# Patient Record
Sex: FEMALE | Race: WHITE | Employment: OTHER | URBAN - METROPOLITAN AREA
[De-identification: names, ages, dates, MRNs, and addresses within clinical notes are randomized per-mention and may not be internally consistent; named-entity substitution may affect disease eponyms.]

---

## 2019-11-18 DIAGNOSIS — M25.511 ACUTE PAIN OF RIGHT SHOULDER: Primary | ICD-10-CM

## 2021-02-12 ENCOUNTER — HOSPITAL ENCOUNTER (OUTPATIENT)
Dept: NUCLEAR MEDICINE | Age: 61
Discharge: HOME | End: 2021-02-12
Payer: MEDICARE

## 2021-02-12 ENCOUNTER — HOSPITAL ENCOUNTER (OUTPATIENT)
Dept: NON INVASIVE DIAGNOSTICS | Age: 61
Discharge: HOME | End: 2021-02-12
Payer: MEDICARE

## 2021-02-12 DIAGNOSIS — R07.89 OTHER CHEST PAIN: ICD-10-CM

## 2021-02-12 LAB
LV EF: 75 %
LVEF MODALITY: NORMAL

## 2021-02-12 PROCEDURE — A9500 TC99M SESTAMIBI: HCPCS | Performed by: RADIOLOGY

## 2021-02-12 PROCEDURE — 78452 HT MUSCLE IMAGE SPECT MULT: CPT

## 2021-02-12 PROCEDURE — 93018 CV STRESS TEST I&R ONLY: CPT | Performed by: INTERNAL MEDICINE

## 2021-02-12 PROCEDURE — 93017 CV STRESS TEST TRACING ONLY: CPT

## 2021-02-12 PROCEDURE — 3430000000 HC RX DIAGNOSTIC RADIOPHARMACEUTICAL: Performed by: RADIOLOGY

## 2021-02-12 PROCEDURE — 78452 HT MUSCLE IMAGE SPECT MULT: CPT | Performed by: INTERNAL MEDICINE

## 2021-02-12 PROCEDURE — 93016 CV STRESS TEST SUPVJ ONLY: CPT | Performed by: INTERNAL MEDICINE

## 2021-02-12 PROCEDURE — 6360000002 HC RX W HCPCS: Performed by: FAMILY MEDICINE

## 2021-02-12 RX ADMIN — REGADENOSON 0.4 MG: 0.08 INJECTION, SOLUTION INTRAVENOUS at 09:31

## 2021-02-12 RX ADMIN — Medication 10 MILLICURIE: at 08:14

## 2021-02-12 RX ADMIN — Medication 30 MILLICURIE: at 09:58

## 2021-02-12 NOTE — PROCEDURES
[address] [place]                              CARDIAC STRESS TEST    PATIENT NAME: Shikha Hunt                    :        1960  MED REC NO:   [id number]                            ROOM:  ACCOUNT NO:   [de-identified]                           ADMIT DATE: 2021  PROVIDER:     Cherri Sanches DO    CARDIOVASCULAR DIAGNOSTIC DEPARTMENT    DATE OF STUDY:  2021    PROCEDURE:  Barron Ruse stress test.    Intravenous Lexiscan stress test was performed using a nuclear medicine  in the form of Cardiolite for evaluation of chest pain with a resting  EKG to be normal sinus mechanism. Ventricular rate was 84 beats per  minute with underlying sinus arrhythmia being present. The AZ interval  was normal.  QRS complex was not prolonged. Left axis deviation was  present. No acute finding was noted. Blood pressure at the beginning  of the stress test was asymptomatic, but was low at 85/44. With the aid  of nuclear medicine in the form of Cardiolite, patient was connected to  continuous cardiac monitoring. Intravenous Lexiscan at 0.4 mg was  infused over 10-second period. Immediately after infusion of Lexiscan,  the patient was injected with Cardiolite and the test was terminated 1  minute. The patient was observed in the recovery phase for 6 minutes. Maximum heart rate achieved during administration of Lexiscan was 122  beats per minute. This was 76% maximum predicted. Blood pressure  remained stable during infusion at 94/48. Electrographically, there was  no evidence of Lexiscan-induced ischemia. No significant dysrhythmia  noted. The patient tolerated the infusion well and was transferred to  the Nuclear Department for Cardiolite imaging. In conclusion, no  adverse effects of Lexiscan infusion. Clinical correlation is  recommended.         FLORIDALMA SEYMOUR DO D: 02/12/2021 17:19:49       T: 02/12/2021 17:29:18     [email or web address]  Job#: [id number]     Doc#: [id number]    CC:

## 2021-04-09 ENCOUNTER — TELEPHONE (OUTPATIENT)
Dept: ENDOSCOPY | Age: 61
End: 2021-04-09

## 2021-04-29 ENCOUNTER — TELEPHONE (OUTPATIENT)
Dept: ENDOSCOPY | Age: 61
End: 2021-04-29

## 2021-05-03 DIAGNOSIS — M25.511 ACUTE PAIN OF BOTH SHOULDERS: Primary | ICD-10-CM

## 2021-05-03 DIAGNOSIS — M25.512 ACUTE PAIN OF BOTH SHOULDERS: Primary | ICD-10-CM

## 2021-05-04 ENCOUNTER — OFFICE VISIT (OUTPATIENT)
Dept: ORTHOPEDIC SURGERY | Age: 61
End: 2021-05-04
Payer: MEDICARE

## 2021-05-04 VITALS — TEMPERATURE: 98 F | WEIGHT: 136 LBS | HEIGHT: 64 IN | BODY MASS INDEX: 23.22 KG/M2

## 2021-05-04 DIAGNOSIS — M75.101 TEAR OF RIGHT ROTATOR CUFF, UNSPECIFIED TEAR EXTENT, UNSPECIFIED WHETHER TRAUMATIC: ICD-10-CM

## 2021-05-04 DIAGNOSIS — M75.102 NONTRAUMATIC TEAR OF LEFT ROTATOR CUFF, UNSPECIFIED TEAR EXTENT: Primary | ICD-10-CM

## 2021-05-04 PROCEDURE — G8427 DOCREV CUR MEDS BY ELIG CLIN: HCPCS | Performed by: ORTHOPAEDIC SURGERY

## 2021-05-04 PROCEDURE — 3017F COLORECTAL CA SCREEN DOC REV: CPT | Performed by: ORTHOPAEDIC SURGERY

## 2021-05-04 PROCEDURE — 4004F PT TOBACCO SCREEN RCVD TLK: CPT | Performed by: ORTHOPAEDIC SURGERY

## 2021-05-04 PROCEDURE — G8420 CALC BMI NORM PARAMETERS: HCPCS | Performed by: ORTHOPAEDIC SURGERY

## 2021-05-04 PROCEDURE — 99203 OFFICE O/P NEW LOW 30 MIN: CPT | Performed by: ORTHOPAEDIC SURGERY

## 2021-05-04 RX ORDER — IBUPROFEN 600 MG/1
TABLET ORAL
COMMUNITY
Start: 2021-03-05 | End: 2021-08-02 | Stop reason: ALTCHOICE

## 2021-05-04 RX ORDER — RISPERIDONE 2 MG/1
TABLET, FILM COATED ORAL
COMMUNITY
Start: 2021-03-08 | End: 2022-04-22

## 2021-05-04 RX ORDER — PREGABALIN 75 MG/1
CAPSULE ORAL
COMMUNITY
Start: 2021-04-21

## 2021-05-04 RX ORDER — BUDESONIDE AND FORMOTEROL FUMARATE DIHYDRATE 160; 4.5 UG/1; UG/1
2 AEROSOL RESPIRATORY (INHALATION) 2 TIMES DAILY PRN
COMMUNITY
Start: 2021-04-07

## 2021-05-04 RX ORDER — DIPHENOXYLATE HYDROCHLORIDE AND ATROPINE SULFATE 2.5; .025 MG/1; MG/1
TABLET ORAL
COMMUNITY
Start: 2021-04-06

## 2021-05-04 NOTE — PROGRESS NOTES
Chief Complaint   Patient presents with    Shoulder Pain     b/l shoulder/arm pain for the past 6 months         HPI:    Patient is 61 y.o. female complaining of right shoulder pain and weakness for 6 months. She denies a specific traumatic injury to the right shoulder. Previous treatments include rest, ice, and anti-inflammatory medication and HEP without much relief. She denies any other orthopedic complaints. ROS:    Skin: (-) rash,(-) psoriasis,(-) eczema, (-)skin cancer. Neurologic: (-)numbness, (-)tingling, (-)headaches, (-) LOC. Cardiovascular: (-) Chest pain, (-) swelling in legs/feet, (-) SOB, (-) cramping in legs/feet with walking.     All other review of systems negative except stated above or in HPI      Past Medical History:   Diagnosis Date    Arthritis     Bipolar 1 disorder (HCC)     Chronic fatigue     Depression     Fibromyalgia     GERD (gastroesophageal reflux disease)     Migraines     Neuropathy     PONV (postoperative nausea and vomiting)     Short-term memory loss      Past Surgical History:   Procedure Laterality Date    CARDIOVASCULAR STRESS TEST  02/12/2021    Lexiscan stress test    CHOLECYSTECTOMY      COLONOSCOPY      ENDOSCOPY, COLON, DIAGNOSTIC      HYSTERECTOMY      SINUS SURGERY         Current Outpatient Medications:     SYMBICORT 160-4.5 MCG/ACT AERO, INHALE 1 PUFF BY MOUTH TWICE DAILY, Disp: , Rfl:     diphenoxylate-atropine (LOMOTIL) 2.5-0.025 MG per tablet, TAKE 1 TO 2 TABLETS BY MOUTH EVERY 8 HOURS AS NEEDED, Disp: , Rfl:     ibuprofen (ADVIL;MOTRIN) 600 MG tablet, TAKE 1 TABLET BY MOUTH TWICE DAILY, Disp: , Rfl:     pregabalin (LYRICA) 75 MG capsule, TAKE 1 CAPSULE BY MOUTH FOUR TIMES DAILY, Disp: , Rfl:     risperiDONE (RISPERDAL) 2 MG tablet, TAKE 1 TABLET BY MOUTH AT BEDTIME, Disp: , Rfl:     diclofenac sodium (VOLTAREN) 1 % GEL, Apply topically 2 times daily, Disp: 240 g, Rfl: 1    amitriptyline (ELAVIL) 50 MG tablet, TAKE 1 TABLET BY MOUTH EVERY DAY, Disp: 30 tablet, Rfl: 3    gabapentin (NEURONTIN) 800 MG tablet, Take 1 tablet by mouth 3 times daily, Disp: 90 tablet, Rfl: 0    ARIPiprazole (ABILIFY) 10 MG tablet, Take 10 mg by mouth daily. , Disp: , Rfl:     OLANZapine (ZYPREXA) 10 MG tablet, Take 10 mg by mouth nightly., Disp: , Rfl:     levomilnacipran (FETZIMA) 20 MG CP24 ER capsule, Take 20 mg by mouth daily. , Disp: , Rfl:     Gabapentin, PHN, 300 MG TABS, Take 800 mg by mouth three times daily. , Disp: , Rfl:     esomeprazole (NEXIUM) 40 MG capsule, Take 40 mg by mouth every morning (before breakfast). , Disp: , Rfl:     Sumatriptan-Naproxen Sodium (TREXIMET PO), Take 10 mg by mouth as needed.   , Disp: , Rfl:   Allergies   Allergen Reactions    Erythromycin Rash     Social History     Socioeconomic History    Marital status:      Spouse name: Not on file    Number of children: Not on file    Years of education: Not on file    Highest education level: Not on file   Occupational History    Not on file   Social Needs    Financial resource strain: Not on file    Food insecurity     Worry: Not on file     Inability: Not on file    Transportation needs     Medical: Not on file     Non-medical: Not on file   Tobacco Use    Smoking status: Current Every Day Smoker     Packs/day: 2.00     Years: 1.00     Pack years: 2.00   Substance and Sexual Activity    Alcohol use: No    Drug use: No    Sexual activity: Not on file   Lifestyle    Physical activity     Days per week: Not on file     Minutes per session: Not on file    Stress: Not on file   Relationships    Social connections     Talks on phone: Not on file     Gets together: Not on file     Attends Muslim service: Not on file     Active member of club or organization: Not on file     Attends meetings of clubs or organizations: Not on file     Relationship status: Not on file    Intimate partner violence     Fear of current or ex partner: Not on file     Emotionally abused: Not on file     Physically abused: Not on file     Forced sexual activity: Not on file   Other Topics Concern    Not on file   Social History Narrative    Not on file     No family history on file. Physical Exam:    Temp 98 °F (36.7 °C)   Ht 5' 3.5\" (1.613 m)   Wt 136 lb (61.7 kg)   BMI 23.71 kg/m²     GENERAL: alert, appears stated age, cooperative, no acute distress    HEENT: Head is normocephalic, atraumatic. PERRLA. SKIN: Clean, dry, intact. There is not any cellulitis or cutaneous lesions noted in the upper extremities    PULMONARY: breathing is regular and unlabored, no acute distress    CV: The bilateral upper and lower extremities are warm and well-perfused with brisk capillary refill. 2+ pulses UE and LE bilateral.     PSYCHIATRY: Pleasant mood, appropriate behavior, follows commands    NEURO: Sensation is intact distally with light touch with no alteration. Motor exam of the upper extremities show elbow flexion and extension, wrist flexion and extension, and finger abduction grossly intact 5/5. Upper extremity reflexes are bilaterally symmetrical and within normal limits. LYMPH: No lymphedema present distally in upper or lower extremity. MUSCULOSKELETAL:  Shoulder Exam:  Examination of the right shoulder shows: There is not a deformity. There is not erythema. There is not soft tissue swelling. Deltoid region is  tender to palpation. AC Joint is  tender to palpation. Clavicle is not tender to palpation. Bicipital Groove is  tender to palpation. Pectoralis  is not tender to palpation. Scapula/ trapezius is  tender to palpation.   Left:  ROM Full, Strength: Supraspinatus 5/5, Infraspinatus 5/5, Subscapularis 5/5  Right:  /120/60, Strength: Supraspinatus 4/5, Infraspinatus 5/5, Subscapularis 5/5  Left Shoulder:  Crepitus:  no   Tenderness:  none   Effusion:   none   Impingement: negative   Empty Can:  negative   Speed's:  negative      Apprehension:  negative Cross Arm Sign:  negative   Darlington's:  negative       Neer's:  negative      Belly Press Test:  negative      Drop Arm Test:  negative     Right Shoulder:  Crepitus:  no   Tenderness:  mild   Effusion:   non   Impingement: positive   Empty Can:  positive   Speed's: positive   Apprehension:  not tested   Cross Arm Sign:  positive   Darlington's:  positive      Neer's:  positive      Belly Press Test:  negative   Drop Arm Test:  positive           Imaging:  Xr Shoulder Right (min 2 Views)    Result Date: 5/4/2021  EXAMINATION: THREE XRAY VIEWS OF THE RIGHT SHOULDER; THREE XRAY VIEWS OF THE LEFT SHOULDER 5/4/2021 2:03 pm COMPARISON: None. HISTORY: ORDERING SYSTEM PROVIDED HISTORY: Acute pain of both shoulders FINDINGS: Right shoulder: Very mild osteoarthritis at the [facility] [place] and glenohumeral joints. No fracture or dislocation. Normal soft tissues. Left shoulder: Very mild osteoarthritis at the left AC joint. No fracture or dislocation. Normal soft tissues. Very mild osteoarthritis at the [facility] [place] and glenohumeral joints on the right and at the [facility] [place] joint on the left. Xr Shoulder Left (min 2 Views)    Result Date: 5/4/2021  EXAMINATION: THREE XRAY VIEWS OF THE RIGHT SHOULDER; THREE XRAY VIEWS OF THE LEFT SHOULDER 5/4/2021 2:03 pm COMPARISON: None. HISTORY: ORDERING SYSTEM PROVIDED HISTORY: Acute pain of both shoulders FINDINGS: Right shoulder: Very mild osteoarthritis at the [facility] [place] and glenohumeral joints. No fracture or dislocation. Normal soft tissues. Left shoulder: Very mild osteoarthritis at the left AC joint. No fracture or dislocation. Normal soft tissues. Very mild osteoarthritis at the [facility] [place] and glenohumeral joints on the right and at the [facility] [place] joint on the left. César Narayanan was seen today for shoulder pain. Diagnoses and all orders for this visit:    Nontraumatic tear of left rotator cuff, unspecified tear extent  -     MRI SHOULDER LEFT WO CONTRAST;  Future    Tear of right rotator cuff, unspecified tear extent, unspecified whether traumatic  -     MRI SHOULDER RIGHT WO CONTRAST; Future    Other orders  -     diclofenac sodium (VOLTAREN) 1 % GEL; Apply topically 2 times daily        Patient seen and examined. X-rays reviewed. Patient has exam and history consistent with rotator cuff pathology. MRI recommended for further evaluation and management of possible rotator cuff tear.   Return to clinic after DO Jj  5/4/21

## 2021-05-06 ENCOUNTER — HOSPITAL ENCOUNTER (OUTPATIENT)
Age: 61
Discharge: HOME | End: 2021-05-08
Payer: MEDICARE

## 2021-05-06 DIAGNOSIS — Z01.818 PREOP TESTING: ICD-10-CM

## 2021-05-06 PROCEDURE — U0003 INFECTIOUS AGENT DETECTION BY NUCLEIC ACID (DNA OR RNA); SEVERE ACUTE RESPIRATORY SYNDROME CORONAVIRUS 2 (SARS-COV-2) (CORONAVIRUS DISEASE [COVID-19]), AMPLIFIED PROBE TECHNIQUE, MAKING USE OF HIGH THROUGHPUT TECHNOLOGIES AS DESCRIBED BY CMS-2020-01-R: HCPCS

## 2021-05-06 PROCEDURE — U0005 INFEC AGEN DETEC AMPLI PROBE: HCPCS

## 2021-05-08 LAB
SARS-COV-2: NOT DETECTED
SOURCE: NORMAL

## 2021-05-11 ENCOUNTER — TELEPHONE (OUTPATIENT)
Dept: ENDOSCOPY | Age: 61
End: 2021-05-11

## 2021-05-11 ENCOUNTER — HOSPITAL ENCOUNTER (OUTPATIENT)
Dept: MRI IMAGING | Age: 61
Discharge: HOME | End: 2021-05-13
Payer: MEDICARE

## 2021-05-11 DIAGNOSIS — M75.101 TEAR OF RIGHT ROTATOR CUFF, UNSPECIFIED TEAR EXTENT, UNSPECIFIED WHETHER TRAUMATIC: ICD-10-CM

## 2021-05-11 DIAGNOSIS — M75.102 NONTRAUMATIC TEAR OF LEFT ROTATOR CUFF, UNSPECIFIED TEAR EXTENT: ICD-10-CM

## 2021-05-11 PROCEDURE — 73221 MRI JOINT UPR EXTREM W/O DYE: CPT

## 2021-05-12 ENCOUNTER — HOSPITAL ENCOUNTER (OUTPATIENT)
Age: 61
Setting detail: OUTPATIENT SURGERY
Discharge: HOME | End: 2021-05-12
Attending: INTERNAL MEDICINE | Admitting: INTERNAL MEDICINE
Payer: MEDICARE

## 2021-05-12 DIAGNOSIS — Z01.818 PREOP TESTING: Primary | ICD-10-CM

## 2021-05-12 PROCEDURE — 3609015500 HC GASTRIC/DUODENAL MOTILITY &/OR MANOMETRY STUDY: Performed by: INTERNAL MEDICINE

## 2021-05-12 PROCEDURE — 6370000000 HC RX 637 (ALT 250 FOR IP): Performed by: INTERNAL MEDICINE

## 2021-05-12 RX ORDER — LIDOCAINE HYDROCHLORIDE 20 MG/ML
JELLY TOPICAL PRN
Status: DISCONTINUED | OUTPATIENT
Start: 2021-05-12 | End: 2021-05-12 | Stop reason: ALTCHOICE

## 2021-05-12 NOTE — PROGRESS NOTES
After confirmation of potential allergies, a topical analgesic was used to numb the nares followed by trans-nasal insertion of a high resolution Manometry catheter. Catheter was placed at 51cm. Pressure bands of both UES and LES were observed on the contour color. Patient instructed to take a deep breath to verify placement of catheter and diaphragmatic pinch noted on inspiration. Patient was assisted to semi-supine position and catheter was stabilized with tape. Patient encouraged to relax while acclimating to catheter for several minutes. A 30 second baseline pressure was obtained to identify landmarks. A series of wet swallows with 5 cc of room temperature saline were then administered to assess esophageal motility. At the conclusion of the procedure, the catheter was removed.

## 2021-05-26 ENCOUNTER — OFFICE VISIT (OUTPATIENT)
Dept: ORTHOPEDIC SURGERY | Age: 61
End: 2021-05-26
Payer: MEDICARE

## 2021-05-26 VITALS — BODY MASS INDEX: 23.22 KG/M2 | WEIGHT: 136 LBS | HEIGHT: 64 IN | TEMPERATURE: 98 F

## 2021-05-26 DIAGNOSIS — M75.101 TEAR OF RIGHT ROTATOR CUFF, UNSPECIFIED TEAR EXTENT, UNSPECIFIED WHETHER TRAUMATIC: ICD-10-CM

## 2021-05-26 DIAGNOSIS — Z77.22 TOBACCO SMOKE EXPOSURE: ICD-10-CM

## 2021-05-26 DIAGNOSIS — Z71.82 EXERCISE COUNSELING: ICD-10-CM

## 2021-05-26 DIAGNOSIS — M75.102 NONTRAUMATIC TEAR OF LEFT ROTATOR CUFF, UNSPECIFIED TEAR EXTENT: Primary | ICD-10-CM

## 2021-05-26 PROCEDURE — G8427 DOCREV CUR MEDS BY ELIG CLIN: HCPCS | Performed by: ORTHOPAEDIC SURGERY

## 2021-05-26 PROCEDURE — 4004F PT TOBACCO SCREEN RCVD TLK: CPT | Performed by: ORTHOPAEDIC SURGERY

## 2021-05-26 PROCEDURE — 3017F COLORECTAL CA SCREEN DOC REV: CPT | Performed by: ORTHOPAEDIC SURGERY

## 2021-05-26 PROCEDURE — G8420 CALC BMI NORM PARAMETERS: HCPCS | Performed by: ORTHOPAEDIC SURGERY

## 2021-05-26 PROCEDURE — 99214 OFFICE O/P EST MOD 30 MIN: CPT | Performed by: ORTHOPAEDIC SURGERY

## 2021-05-26 RX ORDER — MELOXICAM 15 MG/1
15 TABLET ORAL DAILY
Qty: 30 TABLET | Refills: 2 | Status: SHIPPED
Start: 2021-05-26 | End: 2021-05-26

## 2021-05-26 RX ORDER — MELOXICAM 15 MG/1
TABLET ORAL
Qty: 90 TABLET | Refills: 1 | Status: SHIPPED
Start: 2021-05-26 | End: 2021-08-02 | Stop reason: ALTCHOICE

## 2021-05-26 NOTE — PROGRESS NOTES
Chief Complaint   Patient presents with    Shoulder Pain     B/L shoulder MRI follow up. HPI:    Patient is 61 y.o. female complaining of right shoulder pain and weakness for 6 months. She denies a specific traumatic injury to the right shoulder. Previous treatments include rest, ice, and anti-inflammatory medication and HEP without much relief. She denies any other orthopedic complaints. Follows up after MRI. ROS:    Skin: (-) rash,(-) psoriasis,(-) eczema, (-)skin cancer. Neurologic: (-)numbness, (-)tingling, (-)headaches, (-) LOC. Cardiovascular: (-) Chest pain, (-) swelling in legs/feet, (-) SOB, (-) cramping in legs/feet with walking.     All other review of systems negative except stated above or in HPI      Past Medical History:   Diagnosis Date    Arthritis     Bipolar 1 disorder ([address]     Chronic fatigue     Depression     Fibromyalgia     GERD (gastroesophageal reflux disease)     Migraines     Neuropathy     PONV (postoperative nausea and vomiting)     Short-term memory loss      Past Surgical History:   Procedure Laterality Date    CARDIOVASCULAR STRESS TEST  02/12/2021    Lexiscan stress test    CHOLECYSTECTOMY      COLONOSCOPY      ENDOSCOPY, COLON, DIAGNOSTIC      ESOPHAGEAL MOTILITY STUDY N/A 5/12/2021    ESOPHAGEAL MOTILITY/MANOMETRY STUDY performed by Mahendra House DO at [address]         Current Outpatient Medications:     meloxicam (MOBIC) 15 MG tablet, TAKE 1 TABLET BY MOUTH DAILY WITH FOOD, Disp: 90 tablet, Rfl: 1    SYMBICORT 160-4.5 MCG/ACT AERO, INHALE 1 PUFF BY MOUTH TWICE DAILY, Disp: , Rfl:     diphenoxylate-atropine (LOMOTIL) 2.5-0.025 MG per tablet, TAKE 1 TO 2 TABLETS BY MOUTH EVERY 8 HOURS AS NEEDED, Disp: , Rfl:     ibuprofen (ADVIL;MOTRIN) 600 MG tablet, TAKE 1 TABLET BY MOUTH TWICE DAILY, Disp: , Rfl:     pregabalin (LYRICA) 75 MG capsule, TAKE 1 CAPSULE BY MOUTH FOUR TIMES DAILY, Disp: , Rfl:    risperiDONE (RISPERDAL) 2 MG tablet, TAKE 1 TABLET BY MOUTH AT BEDTIME, Disp: , Rfl:     diclofenac sodium (VOLTAREN) 1 % GEL, Apply topically 2 times daily, Disp: 240 g, Rfl: 1    amitriptyline (ELAVIL) 50 MG tablet, TAKE 1 TABLET BY MOUTH EVERY DAY, Disp: 30 tablet, Rfl: 3    gabapentin (NEURONTIN) 800 MG tablet, Take 1 tablet by mouth 3 times daily, Disp: 90 tablet, Rfl: 0    ARIPiprazole (ABILIFY) 10 MG tablet, Take 10 mg by mouth daily. , Disp: , Rfl:     OLANZapine (ZYPREXA) 10 MG tablet, Take 10 mg by mouth nightly., Disp: , Rfl:     levomilnacipran (FETZIMA) 20 MG CP24 ER capsule, Take 20 mg by mouth daily. , Disp: , Rfl:     Gabapentin, PHN, 300 MG TABS, Take 800 mg by mouth three times daily. , Disp: , Rfl:     esomeprazole (NEXIUM) 40 MG capsule, Take 40 mg by mouth every morning (before breakfast). , Disp: , Rfl:     Sumatriptan-Naproxen Sodium (TREXIMET PO), Take 10 mg by mouth as needed. , Disp: , Rfl:   Allergies   Allergen Reactions    Erythromycin Rash     Social History     Socioeconomic History    Marital status:      Spouse name: Not on file    Number of children: Not on file    Years of education: Not on file    Highest education level: Not on file   Occupational History    Not on file   Tobacco Use    Smoking status: Current Every Day Smoker     Packs/day: 2.00     Years: 1.00     Pack years: 2.00   Substance and Sexual Activity    Alcohol use: No    Drug use: No    Sexual activity: Not on file   Other Topics Concern    Not on file   Social History Narrative    Not on file     Social Determinants of Health     Financial Resource Strain:     Difficulty of Paying Living Expenses:    Food Insecurity:     Worried About Running Out of Food in the Last Year:     Ran Out of Food in the Last Year:    Transportation Needs:     Lack of Transportation (Medical):      Lack of Transportation (Non-Medical):    Physical Activity:     Days of Exercise per Week:     Minutes of Exercise per Session:    Stress:     Feeling of Stress :    Social Connections:     Frequency of Communication with Friends and Family:     Frequency of Social Gatherings with Friends and Family:     Attends Yarsanism Services:     Active Member of Clubs or Organizations:     Attends Club or Organization Meetings:     Marital Status:    Intimate Partner Violence:     Fear of Current or Ex-Partner:     Emotionally Abused:     Physically Abused:     Sexually Abused:      No family history on file. Physical Exam:    Temp 98 °F (36.7 °C)   Ht 5' 3.5\" (1.613 m)   Wt 136 lb (61.7 kg)   BMI 23.71 kg/m²     GENERAL: alert, appears stated age, cooperative, no acute distress    HEENT: Head is normocephalic, atraumatic. PERRLA. SKIN: Clean, dry, intact. There is not any cellulitis or cutaneous lesions noted in the upper extremities    PULMONARY: breathing is regular and unlabored, no acute distress    CV: The bilateral upper and lower extremities are warm and well-perfused with brisk capillary refill. 2+ pulses UE and LE bilateral.     PSYCHIATRY: Pleasant mood, appropriate behavior, follows commands    NEURO: Sensation is intact distally with light touch with no alteration. Motor exam of the upper extremities show elbow flexion and extension, wrist flexion and extension, and finger abduction grossly intact 5/5. Upper extremity reflexes are bilaterally symmetrical and within normal limits. LYMPH: No lymphedema present distally in upper or lower extremity. MUSCULOSKELETAL:  Shoulder Exam:  Examination of the right shoulder shows: There is not a deformity. There is not erythema. There is not soft tissue swelling. Deltoid region is  tender to palpation. AC Joint is  tender to palpation. Clavicle is not tender to palpation. Bicipital Groove is  tender to palpation. Pectoralis  is not tender to palpation. Scapula/ trapezius is  tender to palpation.   Left:  ROM Full, Strength: Supraspinatus 5/5, Infraspinatus 5/5, Subscapularis 5/5  Right:  /120/60, Strength: Supraspinatus 4/5, Infraspinatus 5/5, Subscapularis 5/5  Left Shoulder:  Crepitus:  no   Tenderness:  none   Effusion:   none   Impingement: negative   Empty Can:  negative   Speed's:  negative      Apprehension:  negative   Cross Arm Sign:  negative   [place]'s:  negative       Neer's:  negative      Belly Press Test:  negative      Drop Arm Test:  negative     Right Shoulder:  Crepitus:  no   Tenderness:  mild   Effusion:   non   Impingement: positive   Empty Can:  positive   Speed's: positive   Apprehension:  not tested   Cross Arm Sign:  positive   [place]'s:  positive      Neer's:  positive      Belly Press Test:  negative   Drop Arm Test:  positive           Imaging:  XR SHOULDER RIGHT (MIN 2 VIEWS)    Result Date: 5/4/2021  EXAMINATION: THREE XRAY VIEWS OF THE RIGHT SHOULDER; THREE XRAY VIEWS OF THE LEFT SHOULDER 5/4/2021 2:03 pm COMPARISON: None. HISTORY: ORDERING SYSTEM PROVIDED HISTORY: Acute pain of both shoulders FINDINGS: Right shoulder: Very mild osteoarthritis at the [facility] and glenohumeral joints. No fracture or dislocation. Normal soft tissues. Left shoulder: Very mild osteoarthritis at the left AC joint. No fracture or dislocation. Normal soft tissues. 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 XR SHOULDER LEFT (MIN 2 VIEWS)    Result Date: 5/4/2021  EXAMINATION: THREE XRAY VIEWS OF THE RIGHT SHOULDER; THREE XRAY VIEWS OF THE LEFT SHOULDER 5/4/2021 2:03 pm COMPARISON: None. HISTORY: ORDERING SYSTEM PROVIDED HISTORY: Acute pain of both shoulders FINDINGS: Right shoulder: Very mild osteoarthritis at the [facility] and glenohumeral joints. No fracture or dislocation. Normal soft tissues. Left shoulder: Very mild osteoarthritis at the left AC joint. No fracture or dislocation. Normal soft tissues.      Very mild osteoarthritis at the [facility] and glenohumeral joints on the right and at the [facility] joint on the left. MRI SHOULDER RIGHT WO CONTRAST    Result Date: 5/12/2021  EXAMINATION: MRI OF THE RIGHT SHOULDER WITHOUT CONTRAST   5/11/2021 6:05 pm TECHNIQUE: Multiplanar multisequence MRI of the right shoulder was performed without the administration of intravenous contrast. COMPARISON: Right shoulder plain radiographs from 05/04/2021 HISTORY: ORDERING SYSTEM PROVIDED HISTORY: Tear of right rotator cuff, unspecified tear extent, unspecified whether traumatic 66-year-old female with right shoulder pain and possible right-sided rotator cuff tear FINDINGS: ROTATOR CUFF: Trace fluid in the subacromial subdeltoid bursa. Low-grade partial-thickness articular surface and interstitial tearing of posterior supraspinatus in the critical zone. Moderate underlying supraspinatus tendinopathy. Low-grade partial-thickness articular surface and interstitial tearing of infraspinatus between critical zone and footplate. Mild-to-moderate underlying infraspinatus tendinopathy. Mild subscapularis tendinosis. Teres minor muscle/tendon appears grossly intact without evidence of tearing. No significant atrophy or fatty degeneration of the visualized rotator cuff musculature. BICEPS TENDON: Long head of the biceps tendon properly located in the bicipital groove and seen extending to the biceps labral anchor. Mild tendinosis of the intra-articular long head of the biceps tendon. LABRUM: Mild diffuse labral degeneration. GLENOHUMERAL JOINT: Moderate glenohumeral chondromalacia. Inferior glenohumeral ligament appears intact. Small glenohumeral joint effusion. AC JOINT AND ACROMIOCLAVICULAR ARCH: Mild degenerative change of the right AC joint. Type 2 acromion. BONE MARROW: Marrow edema at the superolateral humeral head. Bone marrow signal intensity within the visualized osseous structures otherwise within normal limits. No acute fracture or dislocation involving the osseous components of the shoulder. OUTLET SPACES: Suprascapular notch and quadrilateral space grossly unremarkable in appearance. No right axillary lymphadenopathy. 1. Low-grade partial-thickness articular-surface and interstitial tearing of posterior supraspinatus in the critical zone. Moderate underlying supraspinatus tendinosis. 2. Low-grade partial-thickness articular surface and interstitial tearing of infraspinatus between critical zone and footplate. Mild-to-moderate underlying infraspinatus tendinosis. 3. Mild subscapularis tendinopathy. 4. Mild tendinosis of the intra-articular long head of the biceps tendon. 5. Mild diffuse labral degeneration. Moderate glenohumeral chondromalacia. Small glenohumeral joint effusion. 6. Mild degenerative change of the right AC joint. MRI SHOULDER LEFT WO CONTRAST    Result Date: 5/12/2021  EXAMINATION: MRI OF THE LEFT SHOULDER WITHOUT CONTRAST   5/11/2021 5:41 pm TECHNIQUE: Multiplanar multisequence MRI of the left shoulder was performed without the administration of intravenous contrast. COMPARISON: Left shoulder radiographs from 05/04/2021 HISTORY: ORDERING SYSTEM PROVIDED HISTORY: Nontraumatic tear of left rotator cuff, unspecified tear extent 61-year-old female with left-sided rotator cuff tear FINDINGS: ROTATOR CUFF: Trace fluid in the subacromial subdeltoid bursa. Low-grade partial-thickness articular surface tearing of mid infraspinatus along the footplate on image 8, series 3. Low-grade partial-thickness articular surface and interstitial tearing of remainder of supraspinatus and anterior superior infraspinatus between critical zone and footplate. Moderate underlying supraspinatus tendinosis. Mild underlying infraspinatus tendinopathy. Low-grade partial-thickness interstitial tearing of the insertional fibers of subscapularis. Mild subscapularis tendinosis. Teres minor muscle/tendon appears grossly intact without evidence of tearing.  No significant atrophy or fatty degeneration of the visualized rotator cuff musculature. BICEPS TENDON: Long head of the biceps tendon properly located in the bicipital groove and seen extending to the biceps labral anchor. Mild tendinosis of the long head of biceps tendon. LABRUM: Mild diffuse labral degeneration. GLENOHUMERAL JOINT: Mild glenohumeral chondromalacia. Small glenohumeral joint effusion. Inferior glenohumeral ligament appears intact AC JOINT AND ACROMIOCLAVICULAR ARCH: Mild degenerative changes of the left AC joint. Type 2 acromion. BONE MARROW: Subcortical cystic changes at the lateral humeral head. Bone marrow signal intensity within the visualized osseous structures otherwise within normal limits. No acute fracture or dislocation involving the osseous components of the shoulder. OUTLET SPACES: Suprascapular notch and quadrilateral space grossly unremarkable in appearance. No left axillary lymphadenopathy. 1. Low-grade partial-thickness articular-surface tearing of mid infraspinatus along the footplate. Low-grade partial-thickness articular surface and interstitial tearing of remainder of supraspinatus and anterior superior infraspinatus between critical zone and footplate. Moderate underlying supraspinatus and mild underlying infraspinatus tendinopathy. 2. Low-grade partial-thickness interstitial tearing of the insertional fibers of subscapularis. Mild subscapularis tendinosis. 3. Mild tendinosis of the long head of the biceps tendon. 4. Mild diffuse labral degeneration. Mild glenohumeral chondromalacia. Small glenohumeral joint effusion. 5. Mild degenerative change of the left AC joint. Jimenez Corley was seen today for shoulder pain.     Diagnoses and all orders for this visit:    Nontraumatic tear of left rotator cuff, unspecified tear extent    Tear of right rotator cuff, unspecified tear extent, unspecified whether traumatic    Exercise counseling    Tobacco smoke exposure    Other orders  -     Discontinue: meloxicam (MOBIC) 15 MG tablet; Take 1 tablet by mouth daily Take with food. Patient seen and examined. X-rays reviewed. Patient has exam and history consistent with rotator cuff pathology. MRI recommended for further evaluation and management of possible rotator cuff tear. Patient seen and examined. MRI reviewed with patient in detail. Natural history and course discussed with patient in long discussion  Treatment options discussed with patient in detail including risks and benefits. Patient should do well with conservative management as patient would like to avoid surgery at this time. In a 15 minute assessment and discussion, patient was counseled on continued weight loss, diet, and physical activity relating to this condition. She was educated with options in detail including nutrition, joining a health club/ weight loss program, and use of cardio equipment such as the Arc [employer]er and the importance of use as well as range of motion and HEP exercises for weight loss. Patient educated about the healing rates with this condition. Patient does smoke and does have secondhand smoke exposure. In this discussion of approximately 5 minutes, patient was counseled about decrease in smoking exposure regards to healing and overall good health. Patient seems reluctant but is willing to listen to the discussion in detail. She states that she will try to cut down as much as possible and states that she will try to quit completely by the next visit. Vicki Phillips,             25 minutes was spent with patient. 50% or greater was spent counseling the patient.

## 2021-06-17 ENCOUNTER — TELEPHONE (OUTPATIENT)
Dept: ADMINISTRATIVE | Age: 61
End: 2021-06-17

## 2021-06-17 NOTE — TELEPHONE ENCOUNTER
Patient calling in wanting to schedule injection for her BL shoulder pain. States that she was told at her last visit that if the medication didn't work, then to call in to schedule for injections. I messaged over to the office to double check that it would be a cortisone injection and not something that would need authorized through her insurance. I don't see anything specifically written in her last office note about what type of injections. I will call patient back to schedule once I get a response from the office. Pt c/o right wrist pain with swelling s/p hitting it against fence last night

## 2021-06-17 NOTE — TELEPHONE ENCOUNTER
[1:27 PM] Cristino Orozco  can just be scheduled for an OV, nothing that has to be authorized for shoulders    Appointment scheduled for Tuesday

## 2021-06-22 ENCOUNTER — OFFICE VISIT (OUTPATIENT)
Dept: ORTHOPEDIC SURGERY | Age: 61
End: 2021-06-22
Payer: MEDICARE

## 2021-06-22 VITALS — WEIGHT: 116 LBS | BODY MASS INDEX: 19.81 KG/M2 | HEIGHT: 64 IN | TEMPERATURE: 98 F

## 2021-06-22 DIAGNOSIS — Z71.82 EXERCISE COUNSELING: ICD-10-CM

## 2021-06-22 DIAGNOSIS — Z77.22 TOBACCO SMOKE EXPOSURE: ICD-10-CM

## 2021-06-22 DIAGNOSIS — M75.101 TEAR OF RIGHT ROTATOR CUFF, UNSPECIFIED TEAR EXTENT, UNSPECIFIED WHETHER TRAUMATIC: ICD-10-CM

## 2021-06-22 DIAGNOSIS — M75.102 NONTRAUMATIC TEAR OF LEFT ROTATOR CUFF, UNSPECIFIED TEAR EXTENT: Primary | ICD-10-CM

## 2021-06-22 PROCEDURE — 3017F COLORECTAL CA SCREEN DOC REV: CPT | Performed by: ORTHOPAEDIC SURGERY

## 2021-06-22 PROCEDURE — 99214 OFFICE O/P EST MOD 30 MIN: CPT | Performed by: ORTHOPAEDIC SURGERY

## 2021-06-22 PROCEDURE — 20610 DRAIN/INJ JOINT/BURSA W/O US: CPT | Performed by: ORTHOPAEDIC SURGERY

## 2021-06-22 PROCEDURE — G8427 DOCREV CUR MEDS BY ELIG CLIN: HCPCS | Performed by: ORTHOPAEDIC SURGERY

## 2021-06-22 PROCEDURE — 4004F PT TOBACCO SCREEN RCVD TLK: CPT | Performed by: ORTHOPAEDIC SURGERY

## 2021-06-22 PROCEDURE — G8420 CALC BMI NORM PARAMETERS: HCPCS | Performed by: ORTHOPAEDIC SURGERY

## 2021-06-22 RX ORDER — TRIAMCINOLONE ACETONIDE 40 MG/ML
40 INJECTION, SUSPENSION INTRA-ARTICULAR; INTRAMUSCULAR ONCE
Status: COMPLETED | OUTPATIENT
Start: 2021-06-22 | End: 2021-06-22

## 2021-06-22 RX ADMIN — TRIAMCINOLONE ACETONIDE 40 MG: 40 INJECTION, SUSPENSION INTRA-ARTICULAR; INTRAMUSCULAR at 13:25

## 2021-06-22 NOTE — PROGRESS NOTES
MG capsule, TAKE 1 CAPSULE BY MOUTH FOUR TIMES DAILY, Disp: , Rfl:     risperiDONE (RISPERDAL) 2 MG tablet, TAKE 1 TABLET BY MOUTH AT BEDTIME, Disp: , Rfl:     amitriptyline (ELAVIL) 50 MG tablet, TAKE 1 TABLET BY MOUTH EVERY DAY, Disp: 30 tablet, Rfl: 3    gabapentin (NEURONTIN) 800 MG tablet, Take 1 tablet by mouth 3 times daily, Disp: 90 tablet, Rfl: 0    ARIPiprazole (ABILIFY) 10 MG tablet, Take 10 mg by mouth daily. , Disp: , Rfl:     OLANZapine (ZYPREXA) 10 MG tablet, Take 10 mg by mouth nightly., Disp: , Rfl:     levomilnacipran (FETZIMA) 20 MG CP24 ER capsule, Take 20 mg by mouth daily. , Disp: , Rfl:     Gabapentin, PHN, 300 MG TABS, Take 800 mg by mouth three times daily. , Disp: , Rfl:     esomeprazole (NEXIUM) 40 MG capsule, Take 40 mg by mouth every morning (before breakfast). , Disp: , Rfl:     Sumatriptan-Naproxen Sodium (TREXIMET PO), Take 10 mg by mouth as needed. , Disp: , Rfl:     diclofenac sodium (VOLTAREN) 1 % GEL, Apply topically 2 times daily, Disp: 240 g, Rfl: 1  Allergies   Allergen Reactions    Erythromycin Rash     Social History     Socioeconomic History    Marital status:      Spouse name: Not on file    Number of children: Not on file    Years of education: Not on file    Highest education level: Not on file   Occupational History    Not on file   Tobacco Use    Smoking status: Current Every Day Smoker     Packs/day: 2.00     Years: 1.00     Pack years: 2.00   Substance and Sexual Activity    Alcohol use: No    Drug use: No    Sexual activity: Not on file   Other Topics Concern    Not on file   Social History Narrative    Not on file     Social Determinants of Health     Financial Resource Strain:     Difficulty of Paying Living Expenses:    Food Insecurity:     Worried About Running Out of Food in the Last Year:     Ran Out of Food in the Last Year:    Transportation Needs:     Lack of Transportation (Medical):      Lack of Transportation (Non-Medical): Physical Activity:     Days of Exercise per Week:     Minutes of Exercise per Session:    Stress:     Feeling of Stress :    Social Connections:     Frequency of Communication with Friends and Family:     Frequency of Social Gatherings with Friends and Family:     Attends Rastafarian Services:     Active Member of Clubs or Organizations:     Attends Club or Organization Meetings:     Marital Status:    Intimate Partner Violence:     Fear of Current or Ex-Partner:     Emotionally Abused:     Physically Abused:     Sexually Abused:      No family history on file. Physical Exam:    Temp 98 °F (36.7 °C)   Ht 5' 3.5\" (1.613 m)   Wt 116 lb (52.6 kg)   BMI 20.23 kg/m²     GENERAL: alert, appears stated age, cooperative, no acute distress    HEENT: Head is normocephalic, atraumatic. PERRLA. SKIN: Clean, dry, intact. There is not any cellulitis or cutaneous lesions noted in the upper extremities    PULMONARY: breathing is regular and unlabored, no acute distress    CV: The bilateral upper and lower extremities are warm and well-perfused with brisk capillary refill. 2+ pulses UE and LE bilateral.     PSYCHIATRY: Pleasant mood, appropriate behavior, follows commands    NEURO: Sensation is intact distally with light touch with no alteration. Motor exam of the upper extremities show elbow flexion and extension, wrist flexion and extension, and finger abduction grossly intact 5/5. Upper extremity reflexes are bilaterally symmetrical and within normal limits. LYMPH: No lymphedema present distally in upper or lower extremity. MUSCULOSKELETAL:  Shoulder Exam:  Examination of the right shoulder shows: There is not a deformity. There is not erythema. There is not soft tissue swelling. Deltoid region is  tender to palpation. AC Joint is  tender to palpation. Clavicle is not tender to palpation. Bicipital Groove is  tender to palpation. Pectoralis  is not tender to palpation.   Scapula/ tissues. 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 MRI SHOULDER RIGHT WO CONTRAST    Result Date: 5/12/2021  EXAMINATION: MRI OF THE RIGHT SHOULDER WITHOUT CONTRAST   5/11/2021 6:05 pm TECHNIQUE: Multiplanar multisequence MRI of the right shoulder was performed without the administration of intravenous contrast. COMPARISON: Right shoulder plain radiographs from 05/04/2021 HISTORY: ORDERING SYSTEM PROVIDED HISTORY: Tear of right rotator cuff, unspecified tear extent, unspecified whether traumatic 41-year-old female with right shoulder pain and possible right-sided rotator cuff tear FINDINGS: ROTATOR CUFF: Trace fluid in the subacromial subdeltoid bursa. Low-grade partial-thickness articular surface and interstitial tearing of posterior supraspinatus in the critical zone. Moderate underlying supraspinatus tendinopathy. Low-grade partial-thickness articular surface and interstitial tearing of infraspinatus between critical zone and footplate. Mild-to-moderate underlying infraspinatus tendinopathy. Mild subscapularis tendinosis. Teres minor muscle/tendon appears grossly intact without evidence of tearing. No significant atrophy or fatty degeneration of the visualized rotator cuff musculature. BICEPS TENDON: Long head of the biceps tendon properly located in the bicipital groove and seen extending to the biceps labral anchor. Mild tendinosis of the intra-articular long head of the biceps tendon. LABRUM: Mild diffuse labral degeneration. GLENOHUMERAL JOINT: Moderate glenohumeral chondromalacia. Inferior glenohumeral ligament appears intact. Small glenohumeral joint effusion. AC JOINT AND ACROMIOCLAVICULAR ARCH: Mild degenerative change of the right AC joint. Type 2 acromion. BONE MARROW: Marrow edema at the superolateral humeral head. Bone marrow signal intensity within the visualized osseous structures otherwise within normal limits.   No acute fracture or dislocation involving the osseous components of the shoulder. OUTLET SPACES: Suprascapular notch and quadrilateral space grossly unremarkable in appearance. No right axillary lymphadenopathy. 1. Low-grade partial-thickness articular-surface and interstitial tearing of posterior supraspinatus in the critical zone. Moderate underlying supraspinatus tendinosis. 2. Low-grade partial-thickness articular surface and interstitial tearing of infraspinatus between critical zone and footplate. Mild-to-moderate underlying infraspinatus tendinosis. 3. Mild subscapularis tendinopathy. 4. Mild tendinosis of the intra-articular long head of the biceps tendon. 5. Mild diffuse labral degeneration. Moderate glenohumeral chondromalacia. Small glenohumeral joint effusion. 6. Mild degenerative change of the right AC joint. MRI SHOULDER LEFT WO CONTRAST    Result Date: 5/12/2021  EXAMINATION: MRI OF THE LEFT SHOULDER WITHOUT CONTRAST   5/11/2021 5:41 pm TECHNIQUE: Multiplanar multisequence MRI of the left shoulder was performed without the administration of intravenous contrast. COMPARISON: Left shoulder radiographs from 05/04/2021 HISTORY: ORDERING SYSTEM PROVIDED HISTORY: Nontraumatic tear of left rotator cuff, unspecified tear extent 40-year-old female with left-sided rotator cuff tear FINDINGS: ROTATOR CUFF: Trace fluid in the subacromial subdeltoid bursa. Low-grade partial-thickness articular surface tearing of mid infraspinatus along the footplate on image 8, series 3. Low-grade partial-thickness articular surface and interstitial tearing of remainder of supraspinatus and anterior superior infraspinatus between critical zone and footplate. Moderate underlying supraspinatus tendinosis. Mild underlying infraspinatus tendinopathy. Low-grade partial-thickness interstitial tearing of the insertional fibers of subscapularis. Mild subscapularis tendinosis.  Teres minor muscle/tendon appears grossly intact without evidence of tearing. No significant atrophy or fatty degeneration of the visualized rotator cuff musculature. BICEPS TENDON: Long head of the biceps tendon properly located in the bicipital groove and seen extending to the biceps labral anchor. Mild tendinosis of the long head of biceps tendon. LABRUM: Mild diffuse labral degeneration. GLENOHUMERAL JOINT: Mild glenohumeral chondromalacia. Small glenohumeral joint effusion. Inferior glenohumeral ligament appears intact AC JOINT AND ACROMIOCLAVICULAR ARCH: Mild degenerative changes of the left AC joint. Type 2 acromion. BONE MARROW: Subcortical cystic changes at the lateral humeral head. Bone marrow signal intensity within the visualized osseous structures otherwise within normal limits. No acute fracture or dislocation involving the osseous components of the shoulder. OUTLET SPACES: Suprascapular notch and quadrilateral space grossly unremarkable in appearance. No left axillary lymphadenopathy. 1. Low-grade partial-thickness articular-surface tearing of mid infraspinatus along the footplate. Low-grade partial-thickness articular surface and interstitial tearing of remainder of supraspinatus and anterior superior infraspinatus between critical zone and footplate. Moderate underlying supraspinatus and mild underlying infraspinatus tendinopathy. 2. Low-grade partial-thickness interstitial tearing of the insertional fibers of subscapularis. Mild subscapularis tendinosis. 3. Mild tendinosis of the long head of the biceps tendon. 4. Mild diffuse labral degeneration. Mild glenohumeral chondromalacia. Small glenohumeral joint effusion. 5. Mild degenerative change of the left AC joint. Daysi Weaver was seen today for shoulder pain.     Diagnoses and all orders for this visit:    Nontraumatic tear of left rotator cuff, unspecified tear extent  -     AL ARTHROCENTESIS ASPIR&/INJ MAJOR JT/BURSA W/O US    Tear of right rotator cuff, unspecified tear extent, unspecified whether traumatic  -     [place] ARTHROCENTESIS ASPIR&/INJ MAJOR JT/BURSA W/O US    Exercise counseling    Tobacco smoke exposure    Other orders  -     triamcinolone acetonide (KENALOG-40) injection 40 mg  -     triamcinolone acetonide (KENALOG-40) injection 40 mg        Patient seen and examined. X-rays reviewed. Patient has exam and history consistent with rotator cuff pathology. MRI recommended for further evaluation and management of possible rotator cuff tear. Patient seen and examined. MRI reviewed with patient in detail. Natural history and course discussed with patient in long discussion  Treatment options discussed with patient in detail including risks and benefits. Patient should do well with conservative management as patient would like to avoid surgery at this time. In a 15 minute assessment and discussion, patient was counseled on continued weight loss, diet, and physical activity relating to this condition. She was educated with options in detail including nutrition, joining a health club/ weight loss program, and use of cardio equipment such as the Arc [employer]er and the importance of use as well as range of motion and HEP exercises for weight loss. Patient educated about the healing rates with this condition. Patient does smoke and does have secondhand smoke exposure. In this discussion of approximately 5 minutes, patient was counseled about decrease in smoking exposure regards to healing and overall good health. Patient seems reluctant but is willing to listen to the discussion in detail. She states that she will try to cut down as much as possible and states that she will try to quit completely by the next visit. Bairon Gonzalez DO            25 minutes was spent with patient. 50% or greater was spent counseling the patient.

## 2021-06-23 ENCOUNTER — TELEPHONE (OUTPATIENT)
Dept: ADMINISTRATIVE | Age: 61
End: 2021-06-23

## 2021-06-23 NOTE — TELEPHONE ENCOUNTER
pt would like to ask Dr Milton Bal if her torn rotator cuff can be repaired? pt not sure of what side this is on, please advise thank you

## 2021-07-06 ENCOUNTER — TELEPHONE (OUTPATIENT)
Dept: ORTHOPEDIC SURGERY | Age: 61
End: 2021-07-06

## 2021-07-16 ENCOUNTER — OFFICE VISIT (OUTPATIENT)
Dept: ORTHOPEDIC SURGERY | Age: 61
End: 2021-07-16
Payer: MEDICARE

## 2021-07-16 VITALS — WEIGHT: 116 LBS | HEIGHT: 64 IN | BODY MASS INDEX: 19.81 KG/M2

## 2021-07-16 DIAGNOSIS — Z77.22 TOBACCO SMOKE EXPOSURE: ICD-10-CM

## 2021-07-16 DIAGNOSIS — Z71.82 EXERCISE COUNSELING: ICD-10-CM

## 2021-07-16 DIAGNOSIS — M75.102 NONTRAUMATIC TEAR OF LEFT ROTATOR CUFF, UNSPECIFIED TEAR EXTENT: Primary | ICD-10-CM

## 2021-07-16 DIAGNOSIS — M54.12 CERVICAL RADICULOPATHY: ICD-10-CM

## 2021-07-16 DIAGNOSIS — M75.101 TEAR OF RIGHT ROTATOR CUFF, UNSPECIFIED TEAR EXTENT, UNSPECIFIED WHETHER TRAUMATIC: ICD-10-CM

## 2021-07-16 PROCEDURE — G8420 CALC BMI NORM PARAMETERS: HCPCS | Performed by: ORTHOPAEDIC SURGERY

## 2021-07-16 PROCEDURE — 3017F COLORECTAL CA SCREEN DOC REV: CPT | Performed by: ORTHOPAEDIC SURGERY

## 2021-07-16 PROCEDURE — 99406 BEHAV CHNG SMOKING 3-10 MIN: CPT | Performed by: ORTHOPAEDIC SURGERY

## 2021-07-16 PROCEDURE — 4004F PT TOBACCO SCREEN RCVD TLK: CPT | Performed by: ORTHOPAEDIC SURGERY

## 2021-07-16 PROCEDURE — 99214 OFFICE O/P EST MOD 30 MIN: CPT | Performed by: ORTHOPAEDIC SURGERY

## 2021-07-16 PROCEDURE — G8427 DOCREV CUR MEDS BY ELIG CLIN: HCPCS | Performed by: ORTHOPAEDIC SURGERY

## 2021-07-16 NOTE — PROGRESS NOTES
  diphenoxylate-atropine (LOMOTIL) 2.5-0.025 MG per tablet, TAKE 1 TO 2 TABLETS BY MOUTH EVERY 8 HOURS AS NEEDED, Disp: , Rfl:     ibuprofen (ADVIL;MOTRIN) 600 MG tablet, TAKE 1 TABLET BY MOUTH TWICE DAILY, Disp: , Rfl:     pregabalin (LYRICA) 75 MG capsule, TAKE 1 CAPSULE BY MOUTH FOUR TIMES DAILY, Disp: , Rfl:     risperiDONE (RISPERDAL) 2 MG tablet, TAKE 1 TABLET BY MOUTH AT BEDTIME, Disp: , Rfl:     diclofenac sodium (VOLTAREN) 1 % GEL, Apply topically 2 times daily, Disp: 240 g, Rfl: 1    amitriptyline (ELAVIL) 50 MG tablet, TAKE 1 TABLET BY MOUTH EVERY DAY, Disp: 30 tablet, Rfl: 3    gabapentin (NEURONTIN) 800 MG tablet, Take 1 tablet by mouth 3 times daily, Disp: 90 tablet, Rfl: 0    ARIPiprazole (ABILIFY) 10 MG tablet, Take 10 mg by mouth daily. , Disp: , Rfl:     OLANZapine (ZYPREXA) 10 MG tablet, Take 10 mg by mouth nightly., Disp: , Rfl:     levomilnacipran (FETZIMA) 20 MG CP24 ER capsule, Take 20 mg by mouth daily. , Disp: , Rfl:     Gabapentin, PHN, 300 MG TABS, Take 800 mg by mouth three times daily. , Disp: , Rfl:     esomeprazole (NEXIUM) 40 MG capsule, Take 40 mg by mouth every morning (before breakfast). , Disp: , Rfl:     Sumatriptan-Naproxen Sodium (TREXIMET PO), Take 10 mg by mouth as needed.   , Disp: , Rfl:   Allergies   Allergen Reactions    Erythromycin Rash     Social History     Socioeconomic History    Marital status:      Spouse name: Not on file    Number of children: Not on file    Years of education: Not on file    Highest education level: Not on file   Occupational History    Not on file   Tobacco Use    Smoking status: Current Every Day Smoker     Packs/day: 2.00     Years: 1.00     Pack years: 2.00   Substance and Sexual Activity    Alcohol use: No    Drug use: No    Sexual activity: Not on file   Other Topics Concern    Not on file   Social History Narrative    Not on file     Social Determinants of Health     Financial Resource Strain:     Difficulty of Paying Living Expenses:    Food Insecurity:     Worried About Running Out of Food in the Last Year:     [address] in the Last Year:    Transportation Needs:     Lack of Transportation (Medical):  Lack of Transportation (Non-Medical):    Physical Activity:     Days of Exercise per Week:     Minutes of Exercise per Session:    Stress:     Feeling of Stress :    Social Connections:     Frequency of Communication with Friends and Family:     Frequency of Social Gatherings with Friends and Family:     Attends Congregational Services:     Active Member of Clubs or Organizations:     Attends Club or Organization Meetings:     Marital Status:    Intimate Partner Violence:     Fear of Current or Ex-Partner:     Emotionally Abused:     Physically Abused:     Sexually Abused:      No family history on file. Physical Exam:    Ht 5' 3.5\" (1.613 m)   Wt 116 lb (52.6 kg)   BMI 20.23 kg/m²     GENERAL: alert, appears stated age, cooperative, no acute distress    HEENT: Head is normocephalic, atraumatic. PERRLA. SKIN: Clean, dry, intact. There is not any cellulitis or cutaneous lesions noted in the upper extremities    PULMONARY: breathing is regular and unlabored, no acute distress    CV: The bilateral upper and lower extremities are warm and well-perfused with brisk capillary refill. 2+ pulses UE and LE bilateral.     PSYCHIATRY: Pleasant mood, appropriate behavior, follows commands    NEURO: Sensation is intact distally with light touch with no alteration. Motor exam of the upper extremities show elbow flexion and extension, wrist flexion and extension, and finger abduction grossly intact 5/5. Upper extremity reflexes are bilaterally symmetrical and within normal limits. LYMPH: No lymphedema present distally in upper or lower extremity. MUSCULOSKELETAL:  Shoulder Exam:  Examination of the right shoulder shows: There is not a deformity. There is not erythema.   There is not soft tissue swelling. Deltoid region is  tender to palpation. AC Joint is  tender to palpation. Clavicle is not tender to palpation. Bicipital Groove is  tender to palpation. Pectoralis  is not tender to palpation. Scapula/ trapezius is  tender to palpation. Left:  ROM Full, Strength: Supraspinatus 5/5, Infraspinatus 5/5, Subscapularis 5/5  Right:  /120/60, Strength: Supraspinatus 4/5, Infraspinatus 5/5, Subscapularis 5/5  Left Shoulder:  Crepitus:  no   Tenderness:  none   Effusion:   none   Impingement: negative   Empty Can:  negative   Speed's:  negative      Apprehension:  negative   Cross Arm Sign:  negative   [place]'s:  negative       Neer's:  negative      Belly Press Test:  negative      Drop Arm Test:  negative     Right Shoulder:  Crepitus:  no   Tenderness:  mild   Effusion:   non   Impingement: positive   Empty Can:  positive   Speed's: positive   Apprehension:  not tested   Cross Arm Sign:  positive   [place]'s:  positive      Neer's:  positive      Belly Press Test:  negative   Drop Arm Test:  positive           Imaging:  XR SHOULDER RIGHT (MIN 2 VIEWS)    Result Date: 5/4/2021  EXAMINATION: THREE XRAY VIEWS OF THE RIGHT SHOULDER; THREE XRAY VIEWS OF THE LEFT SHOULDER 5/4/2021 2:03 pm COMPARISON: None. HISTORY: ORDERING SYSTEM PROVIDED HISTORY: Acute pain of both shoulders FINDINGS: Right shoulder: Very mild osteoarthritis at the [facility] and glenohumeral joints. No fracture or dislocation. Normal soft tissues. Left shoulder: Very mild osteoarthritis at the left AC joint. No fracture or dislocation. Normal soft tissues. Very mild osteoarthritis at the [facility] and glenohumeral joints on the right and at the [facility] joint on the left. XR SHOULDER LEFT (MIN 2 VIEWS)    Result Date: 5/4/2021  EXAMINATION: THREE XRAY VIEWS OF THE RIGHT SHOULDER; THREE XRAY VIEWS OF THE LEFT SHOULDER 5/4/2021 2:03 pm COMPARISON: None.  HISTORY: ORDERING SYSTEM PROVIDED HISTORY: Acute pain of both shoulders FINDINGS: Right shoulder: Very mild osteoarthritis at the [facility] and glenohumeral joints. No fracture or dislocation. Normal soft tissues. Left shoulder: Very mild osteoarthritis at the left AC joint. No fracture or dislocation. Normal soft tissues. Very mild osteoarthritis at the [facility] and glenohumeral joints on the right and at the [facility] joint on the left. MRI SHOULDER RIGHT WO CONTRAST    Result Date: 5/12/2021  EXAMINATION: MRI OF THE RIGHT SHOULDER WITHOUT CONTRAST   5/11/2021 6:05 pm TECHNIQUE: Multiplanar multisequence MRI of the right shoulder was performed without the administration of intravenous contrast. COMPARISON: Right shoulder plain radiographs from 05/04/2021 HISTORY: ORDERING SYSTEM PROVIDED HISTORY: Tear of right rotator cuff, unspecified tear extent, unspecified whether traumatic 61-year-old female with right shoulder pain and possible right-sided rotator cuff tear FINDINGS: ROTATOR CUFF: Trace fluid in the subacromial subdeltoid bursa. Low-grade partial-thickness articular surface and interstitial tearing of posterior supraspinatus in the critical zone. Moderate underlying supraspinatus tendinopathy. Low-grade partial-thickness articular surface and interstitial tearing of infraspinatus between critical zone and footplate. Mild-to-moderate underlying infraspinatus tendinopathy. Mild subscapularis tendinosis. Teres minor muscle/tendon appears grossly intact without evidence of tearing. No significant atrophy or fatty degeneration of the visualized rotator cuff musculature. BICEPS TENDON: Long head of the biceps tendon properly located in the bicipital groove and seen extending to the biceps labral anchor. Mild tendinosis of the intra-articular long head of the biceps tendon. LABRUM: Mild diffuse labral degeneration. GLENOHUMERAL JOINT: Moderate glenohumeral chondromalacia. Inferior glenohumeral ligament appears intact. Small glenohumeral joint effusion.  AC JOINT AND ACROMIOCLAVICULAR ARCH: Mild degenerative change of the right AC joint. Type 2 acromion. BONE MARROW: Marrow edema at the superolateral humeral head. Bone marrow signal intensity within the visualized osseous structures otherwise within normal limits. No acute fracture or dislocation involving the osseous components of the shoulder. OUTLET SPACES: Suprascapular notch and quadrilateral space grossly unremarkable in appearance. No right axillary lymphadenopathy. 1. Low-grade partial-thickness articular-surface and interstitial tearing of posterior supraspinatus in the critical zone. Moderate underlying supraspinatus tendinosis. 2. Low-grade partial-thickness articular surface and interstitial tearing of infraspinatus between critical zone and footplate. Mild-to-moderate underlying infraspinatus tendinosis. 3. Mild subscapularis tendinopathy. 4. Mild tendinosis of the intra-articular long head of the biceps tendon. 5. Mild diffuse labral degeneration. Moderate glenohumeral chondromalacia. Small glenohumeral joint effusion. 6. Mild degenerative change of the right AC joint. MRI SHOULDER LEFT WO CONTRAST    Result Date: 5/12/2021  EXAMINATION: MRI OF THE LEFT SHOULDER WITHOUT CONTRAST   5/11/2021 5:41 pm TECHNIQUE: Multiplanar multisequence MRI of the left shoulder was performed without the administration of intravenous contrast. COMPARISON: Left shoulder radiographs from 05/04/2021 HISTORY: ORDERING SYSTEM PROVIDED HISTORY: Nontraumatic tear of left rotator cuff, unspecified tear extent 75-year-old female with left-sided rotator cuff tear FINDINGS: ROTATOR CUFF: Trace fluid in the subacromial subdeltoid bursa. Low-grade partial-thickness articular surface tearing of mid infraspinatus along the footplate on image 8, series 3. Low-grade partial-thickness articular surface and interstitial tearing of remainder of supraspinatus and anterior superior infraspinatus between critical zone and footplate.  Moderate underlying supraspinatus tendinosis. Mild underlying infraspinatus tendinopathy. Low-grade partial-thickness interstitial tearing of the insertional fibers of subscapularis. Mild subscapularis tendinosis. Teres minor muscle/tendon appears grossly intact without evidence of tearing. No significant atrophy or fatty degeneration of the visualized rotator cuff musculature. BICEPS TENDON: Long head of the biceps tendon properly located in the bicipital groove and seen extending to the biceps labral anchor. Mild tendinosis of the long head of biceps tendon. LABRUM: Mild diffuse labral degeneration. GLENOHUMERAL JOINT: Mild glenohumeral chondromalacia. Small glenohumeral joint effusion. Inferior glenohumeral ligament appears intact AC JOINT AND ACROMIOCLAVICULAR ARCH: Mild degenerative changes of the left AC joint. Type 2 acromion. BONE MARROW: Subcortical cystic changes at the lateral humeral head. Bone marrow signal intensity within the visualized osseous structures otherwise within normal limits. No acute fracture or dislocation involving the osseous components of the shoulder. OUTLET SPACES: Suprascapular notch and quadrilateral space grossly unremarkable in appearance. No left axillary lymphadenopathy. 1. Low-grade partial-thickness articular-surface tearing of mid infraspinatus along the footplate. Low-grade partial-thickness articular surface and interstitial tearing of remainder of supraspinatus and anterior superior infraspinatus between critical zone and footplate. Moderate underlying supraspinatus and mild underlying infraspinatus tendinopathy. 2. Low-grade partial-thickness interstitial tearing of the insertional fibers of subscapularis. Mild subscapularis tendinosis. 3. Mild tendinosis of the long head of the biceps tendon. 4. Mild diffuse labral degeneration. Mild glenohumeral chondromalacia. Small glenohumeral joint effusion. 5. Mild degenerative change of the left AC joint.                Rosalia Carranza was seen today for shoulder pain. Diagnoses and all orders for this visit:    Nontraumatic tear of left rotator cuff, unspecified tear extent    Tear of right rotator cuff, unspecified tear extent, unspecified whether traumatic    Exercise counseling    Tobacco smoke exposure    Cervical radiculopathy        Patient seen and examined. X-rays reviewed. Patient has exam and history consistent with rotator cuff pathology. MRI recommended for further evaluation and management of possible rotator cuff tear. Patient seen and examined. MRI reviewed with patient in detail. Natural history and course discussed with patient in long discussion  Treatment options discussed with patient in detail including risks and benefits. I discussed the risks and benefits of the shoulder arthroscopy with the patient. The risks include but are not limited to: infection, injuries to blood vessels and nerves, non relief of symptoms, intraoperative fracture, need for further operative intervention, blood loss, arthrofibrosis of shoulder, DVT/PE, MI and death. The patient understands these risks and wishes to proceed with surgery. I will perform a Left shoulder arthroscopy, SAD and debridement and possible rotator cuff repair. The patient was counseled at length about the risks of arron Covid-19 during their perioperative period and any recovery window from their procedure. The patient was made aware that arron Covid-19  may worsen their prognosis for recovering from their procedure  and lend to a higher morbidity and/or mortality risk. All material risks, benefits, and reasonable alternatives including postponing the procedure were discussed. The patient does wish to proceed with the procedure at this time. In a 15 minute assessment and discussion, patient was counseled on continued weight loss, diet, and physical activity relating to this condition.  She was educated with options in detail including nutrition, joining a health club/ weight loss program, and use of cardio equipment such as the Arc [employer]er and the importance of use as well as range of motion and HEP exercises for weight loss. Patient educated about the healing rates with this condition. Patient does smoke and does have secondhand smoke exposure. In this discussion of approximately 5 minutes, patient was counseled about decrease in smoking exposure regards to healing and overall good health. Patient seems reluctant but is willing to listen to the discussion in detail. She states that she will try to cut down as much as possible and states that she will try to quit completely by the next visit. Anita Martin DO            25 minutes was spent with patient. 50% or greater was spent counseling the patient.

## 2021-07-16 NOTE — PATIENT INSTRUCTIONS
this standing under a warm shower. · Follow your doctor's advice for physical therapy. When your doctor says it is okay, try these stretching exercises. Do them slowly to avoid injury. Put a warm, wet towel on your shoulder before exercising. Stop any exercise that increases pain. ? Range-of-motion exercises. If it is not too painful, stretch your arm in four directions: across the body, up the back, to the side, and overhead. ? Pendulum exercise. Lean forward and hold onto a table or the back of a chair with your good arm. Bend at the waist, letting the arm with the sore shoulder hang straight down. Swing your arm back and forth like a pendulum, then in circles that start small and slowly grow larger. This exercise does not use the arm muscles. Instead, use your legs and your hips to create movement that makes your arm swing freely. Try this for about 5 minutes, several times a day. ? Wall climbing (to the side). Stand with your side to a wall so that your fingers can just touch it. Then turn so your body is turned slightly toward the wall. Walk the fingers of your injured arm up the wall as high as pain permits. Try not to shrug your shoulder up toward your ear as you move your arm up. Hold that position for a count of 15 to 30 seconds. Walk your fingers down to the starting position. Repeat 2 to 4 times, trying to reach higher each time. ? Wall climbing (to the front). Face a wall, standing so your fingers can just touch it. Walk the fingers of your affected arm up the wall as high as pain permits. Try not to shrug your shoulder up toward your ear as you move your arm up. Hold that position for a count of 15 to 30 seconds. Slowly walk your fingers to the starting position. Repeat 2 to 4 times, trying to reach higher each time. · Rest your shoulder when you are not doing stretches and other exercises. Your doctor may tell you to wait for the pain to go away before doing exercises.  Do not lift heavy bags of exercises correctly or if you have any pain. Hearing clicks and pops during exercise is not always cause for concern, but a grinding feeling may mean a more serious problem. Ice your shoulder after exercising if it is sore. Follow-up care is a key part of your treatment and safety. Be sure to make and go to all appointments, and call your doctor if you are having problems. It's also a good idea to know your test results and keep a list of the medicines you take. Stretching exercises  Do not start doing stretching exercises until your doctor says you can. Your doctor will tell you which exercises to do, and how often and how long to do them. Posterior stretch   · Stand upright with your feet shoulder-width apart. · Put the hand of your affected arm on the opposite shoulder, and hold the elbow to your body. · Then, using your good arm, hold the elbow of your affected arm and move it gently up, away from, and across your body. External rotation   · Hold a lightweight stick or davey in your good arm. It should be about 2 feet long. A curtain davey may work well. · Lie on your back with your elbows next to your sides. Rest the elbow of your affected arm on a small pillow or folded towel. · Set your arms so that the elbows are bent at a 90-degree angle, like the letter \"L. \" Your hands will point straight up. · Hold the stick with both hands. Use your good arm to push the stick toward the affected arm so the affected arm moves outward, away from your body. Stop when you feel the arm stretching. Strength exercises  Do not start strength exercises until your doctor says you can. Usually, this is several weeks after surgery. Your doctor will tell you how often and how long to do the exercises. Arm raises to the side   · Stand upright with your feet shoulder-width apart and your affected arm at your side. · Slowly raise your injured arm to the side, with your thumb facing up.  Raise your arm 60 degrees at the most (shoulder level is 90 degrees). · After holding the position for 3 to 5 seconds, lower your arm back to your side. If you need to, bring your \"good\" arm across your body and place it under the elbow as you lower your injured arm. Use your good arm to keep your injured arm from dropping down too fast during the downward motion. · Repeat 8 to 12 times. · When you first start out, don't hold any additional weight in your hand. As your strength improves, you may use a 1- to 2-pound dumbbell or a small can of food. Shoulder flexor   · Stand facing a wall. Your body should be about 6 inches away from the wall. · Keep your affected arm and elbow to your side, and bend your elbow so that your arm is pointing toward the wall. · Make a closed fist with your thumb on top. · Push your hand into the wall and hold it for 6 seconds. Push with 25% to 50% of the force you have. Shoulder extension   · Stand with your back flat against a wall. · Keep your affected arm and elbow at your side, and bend your elbow so that your upper arm is against the wall and your lower arm is pointing straight ahead. Make a closed fist with your thumb on top. · Push your elbow gently back against the wall, holding for 6 seconds. Push with 25% to 50% of the force you have. Where can you learn more? Go to https://ContentWatchmontyeb.Arsanis. org and sign in to your Alumni[employer]under account. Enter Y604 in the "[employer] box to learn more about \"Rotator Cuff Rehabilitation. \"     If you do not have an account, please click on the \"Sign Up Now\" link. Current as of: November 16, 2020               Content Version: 12.9  © 7629-2067 Healthwise, Incorporated. Care instructions adapted under license by [facility] [employer] [facility] ([facility]). If you have questions about a medical condition or this instruction, always ask your healthcare professional. [address] any warranty or liability for your use of this information.          Patient Education doctor if you are having problems. It's also a good idea to know your test results and keep a list of the medicines you take. How do you prepare for surgery? Surgery can be stressful. This information will help you understand what you can expect. And it will help you safely prepare for surgery. Preparing for surgery    · Be sure you have someone to take you home. Anesthesia and pain medicine will make it unsafe for you to drive or get home on your own.     · Understand exactly what surgery is planned, along with the risks, benefits, and other options.     · If you take aspirin or some other blood thinner, ask your doctor if you should stop taking it before your surgery. Make sure that you understand exactly what your doctor wants you to do. These medicines increase the risk of bleeding.     · Tell your doctor ALL the medicines, vitamins, supplements, and herbal remedies you take. Some may increase the risk of problems during your surgery. Your doctor will tell you if you should stop taking any of them before the surgery and how soon to do it.     · Make sure your doctor and the hospital have a copy of your advance directive. If you don't have one, you may want to prepare one. It lets others know your health care wishes. It's a good thing to have before any type of surgery or procedure. What happens on the day of surgery? · Follow the instructions exactly about when to stop eating and drinking. If you don't, your surgery may be canceled. If your doctor told you to take your medicines on the day of surgery, take them with only a sip of water.     · Take a bath or shower before you come in for your surgery. Do not apply lotions, perfumes, deodorants, or nail polish.     · Do not shave the surgical site yourself.     · Take off all jewelry and piercings. And take out contact lenses, if you wear them.    At the hospital or surgery center   · Bring a picture ID.     · The area for surgery is often marked to make sure there are no errors.     · You will be kept comfortable and safe by your anesthesia provider. The anesthesia may make you sleep. Or it may just numb the area being worked on.     · The surgery will take about 1 to 2 hours. It depends on what type of shoulder problem you have. When should you call your doctor? · You have questions or concerns.     · You don't understand how to prepare for your surgery.     · You become ill before the surgery (such as fever, flu, or a cold).     · You need to reschedule or have changed your mind about having the surgery. Where can you learn more? Go to https://MotorwayBuddypeTechflakesGB.trueAnthem. org and sign in to your [employer] account. Enter W180 in the Cloud Security box to learn more about \"Shoulder Arthroscopy: Before Your Surgery. \"     If you do not have an account, please click on the \"Sign Up Now\" link. Current as of: November 16, 2020               Content Version: 12.9  © 2006-2021 [employer]. Care instructions adapted under license by [facility] ([facility]). If you have questions about a medical condition or this instruction, always ask your healthcare professional. [place] [address] any warranty or liability for your use of this information. Patient Education        Shoulder Arthroscopy: What to Expect at Home  Your Recovery     Arthroscopy is a way to find problems and do surgery inside a joint without making a large cut (incision). Your doctor put a lighted tube with a tiny camera--called an arthroscope, or scope--and surgical tools through small incisions in your shoulder. Your arm may be in a sling. You will feel tired for several days. Your shoulder will be swollen. And you may notice that your skin is a different color near the cuts the doctor made (incisions). Your hand and arm may also be swollen. This is normal and will go away in a few days.  Depending on the medicine you had during the surgery, your entire arm may feel numb or like you can't move it. This goes away in 12 to 24 hours. How soon you can go back to work or your usual routine will depend on your shoulder problem. Most people need 6 weeks or longer to recover. How much time you need depends on the surgery that was done. You may have to limit your activity until your shoulder strength and range of motion are back to normal. You may also be in a rehabilitation program (rehab). If you have a desk job, you may be able to go back to work a few days after the surgery. If you lift things at work, it may take months before you can go back. This care sheet gives you a general idea about how long it will take for you to recover. But each person recovers at a different pace. Follow the steps below to get better as quickly as possible. How can you care for yourself at home? Activity    · Rest when you feel tired. Getting enough sleep will help you recover. You may be more comfortable if you sleep in a reclining chair. To make your arm and shoulder feel better, keep a thin pillow under the back of your arm while you are lying down.     · Try to walk each day. Start by walking a little more than you did the day before. Bit by bit, increase the amount you walk. Walking boosts blood flow and helps prevent pneumonia and constipation.     · For 2 to 3 weeks, avoid lifting anything heavier than a plate or a glass. You need to give your shoulder time to heal.     · Your arm may be in a sling. You may need to use the sling for a few days to a few weeks. Your doctor will advise you on how long to wear the sling.     · You may take the sling off when you dress or wash.     · Do not use your arm for repeated movements. These include painting, vacuuming, or using a computer. Diet    · You can eat your normal diet. If your stomach is upset, try bland, low-fat foods like plain rice, broiled chicken, toast, and yogurt.     · Drink plenty of fluids, unless your doctor tells you not to.   · You may notice that your bowel movements are not regular right after your surgery. This is common. Try to avoid constipation and straining with bowel movements. You may want to take a fiber supplement every day. If you have not had a bowel movement after a couple of days, ask your doctor about taking a mild laxative. Medicines    · Your doctor will tell you if and when you can restart your medicines. He or she will also give you instructions about taking any new medicines.     · If you take aspirin or some other blood thinner, ask your doctor if and when to start taking it again. Make sure that you understand exactly what your doctor wants you to do.     · Take pain medicines exactly as directed. ? If the doctor gave you a prescription medicine for pain, take it as prescribed. ? If you are not taking a prescription pain medicine, ask your doctor if you can take an over-the-counter medicine.     · If you think your pain medicine is making you sick to your stomach:  ? Take your medicine after meals (unless your doctor has told you not to). ? Ask your doctor for a different pain medicine.     · If your doctor prescribed antibiotics, take them as directed. Do not stop taking them just because you feel better. You need to take the full course of antibiotics. Incision care    · If you have a dressing over your incision, keep it clean and dry. You may remove it 2 to 3 days after the surgery.     · If your incision is open to the air, keep the area clean and dry.     · If you have strips of tape on the incision, leave the tape on for a week or until it falls off. Exercise    · You may need rehabilitation. This is a series of exercises you do after your surgery. Rehab helps you get back your shoulder's range of motion and strength.  You will work with your doctor and physical therapist to plan this exercise program. To get the best results, you need to do the exercises correctly and as often and as long as your doctor tells you. Ice    · To reduce swelling and pain, put ice or a cold pack on your shoulder for 10 to 20 minutes at a time. Do this every 1 to 2 hours. Put a thin cloth between the ice and your skin. Follow-up care is a key part of your treatment and safety. Be sure to make and go to all appointments, and call your doctor if you are having problems. It's also a good idea to know your test results and keep a list of the medicines you take. When should you call for help? Call 911 anytime you think you may need emergency care. For example, call if:    · You passed out (lost consciousness).     · You have severe trouble breathing.     · You have sudden chest pain and shortness of breath, or you cough up blood. Call your doctor now or seek immediate medical care if:    · Your hand is cool, pale, or numb, or it changes color.     · You are unable to move your fingers, wrist, or elbow.     · You are sick to your stomach or cannot keep fluids down.     · You have pain that does not get better after you take pain medicine.     · You have signs of infection, such as:  ? Increased pain, swelling, warmth, or redness. ? Red streaks leading from the incision. ? Pus draining from the incision. ? A fever.     · You have loose stitches, or your incision comes open.     · Your incision bleeds through your first dressing or is still bleeding 3 days after your surgery. Watch closely for changes in your health, and be sure to contact your doctor if:    · Your sling feels too tight, and you cannot loosen it.     · You have new or increased swelling in your arm.     · You have new pain that develops in another area of the affected limb. For example, you have pain in your hand or elbow.     · You do not have a bowel movement after taking a laxative.     · You do not get better as expected. Where can you learn more? Go to [email or web address]://Baccaratthomas.flipClass. org and sign in to your [employer] account.  Enter M646 in the Search Health Information box to learn more about \"Shoulder Arthroscopy: What to Expect at Home. \"     If you do not have an account, please click on the \"Sign Up Now\" link. Current as of: November 16, 2020               Content Version: 12.9  © 1230-2015 Healthwise, Incorporated. Care instructions adapted under license by [facility] ([facility]). If you have questions about a medical condition or this instruction, always ask your healthcare professional. [address] any warranty or liability for your use of this information.

## 2021-08-02 ENCOUNTER — ANESTHESIA EVENT (OUTPATIENT)
Dept: OPERATING ROOM | Age: 61
End: 2021-08-02
Payer: MEDICARE

## 2021-08-02 ENCOUNTER — TELEPHONE (OUTPATIENT)
Dept: ORTHOPEDIC SURGERY | Age: 61
End: 2021-08-02

## 2021-08-02 RX ORDER — PANTOPRAZOLE SODIUM 40 MG/1
40 TABLET, DELAYED RELEASE ORAL DAILY
COMMUNITY

## 2021-08-02 RX ORDER — CETIRIZINE HYDROCHLORIDE 10 MG/1
10 TABLET ORAL DAILY
COMMUNITY
End: 2022-04-22

## 2021-08-09 ASSESSMENT — LIFESTYLE VARIABLES: SMOKING_STATUS: 1

## 2021-08-09 NOTE — ANESTHESIA PRE PROCEDURE
Department of Anesthesiology  Preprocedure Note       Name:  Vanessa Oscar   Age:  61 y.o.  :  1960                                          MRN:  [id number]         Date:  2021      Surgeon: Jazmyn Gore): Lin Hager DO    Procedure: Procedure(s):  LEFT SHOULDER ARTHROSCOPY, SUBACROMIAL DECOMPRESSION, DEBRIDEMENT (ARTHREX)    Medications prior to admission:   Prior to Admission medications    Medication Sig Start Date End Date Taking? Authorizing Provider   cetirizine (ZYRTEC) 10 MG tablet Take 10 mg by mouth daily   Yes Historical Provider, MD   pantoprazole (PROTONIX) 40 MG tablet Take 40 mg by mouth daily   Yes Historical Provider, MD   SYMBICORT 160-4.5 MCG/ACT AERO Inhale 2 puffs into the lungs 2 times daily as needed  21  Yes Historical Provider, MD   diphenoxylate-atropine (LOMOTIL) 2.5-0.025 MG per tablet TAKE 1 TO 2 TABLETS BY MOUTH EVERY 8 HOURS AS NEEDED 21  Yes Historical Provider, MD   pregabalin (LYRICA) 75 MG capsule TAKE 1 CAPSULE BY MOUTH FOUR TIMES DAILY 21  Yes Historical Provider, MD   risperiDONE (RISPERDAL) 2 MG tablet TAKE 1 TABLET BY MOUTH AT BEDTIME 3/8/21  Yes Historical Provider, MD   diclofenac sodium (VOLTAREN) 1 % GEL Apply topically 2 times daily 5/4/21 6/3/21  Lin Hager DO       Current medications:    No current facility-administered medications for this encounter.      Current Outpatient Medications   Medication Sig Dispense Refill    cetirizine (ZYRTEC) 10 MG tablet Take 10 mg by mouth daily      pantoprazole (PROTONIX) 40 MG tablet Take 40 mg by mouth daily      SYMBICORT 160-4.5 MCG/ACT AERO Inhale 2 puffs into the lungs 2 times daily as needed       diphenoxylate-atropine (LOMOTIL) 2.5-0.025 MG per tablet TAKE 1 TO 2 TABLETS BY MOUTH EVERY 8 HOURS AS NEEDED      pregabalin (LYRICA) 75 MG capsule TAKE 1 CAPSULE BY MOUTH FOUR TIMES DAILY      risperiDONE (RISPERDAL) 2 MG tablet TAKE 1 TABLET BY MOUTH AT BEDTIME      diclofenac sodium (VOLTAREN) Component Value Date     07/15/2011    K 3.9 07/15/2011     07/15/2011    CO2 28 07/15/2011    BUN <5 07/15/2011    CREATININE 0.7 07/15/2011    LABGLOM >60 07/15/2011    GLUCOSE 96 07/15/2011    PROT 5.6 07/14/2011    CALCIUM 7.8 07/15/2011    BILITOT 0.5 07/14/2011    ALKPHOS 58 07/14/2011    AST 10 07/14/2011    ALT 9 07/14/2011       POC Tests: No results for input(s): POCGLU, POCNA, POCK, POCCL, POCBUN, POCHEMO, POCHCT in the last 72 hours. Coags: No results found for: PROTIME, INR, APTT    HCG (If Applicable): No results found for: PREGTESTUR, PREGSERUM, HCG, HCGQUANT     ABGs: No results found for: PHART, PO2ART, VGP8JFX, QHE3XQC, BEART, N7STEJWH     Type & Screen (If Applicable):  No results found for: LABABO, LABRH    Drug/Infectious Status (If Applicable):  No results found for: HIV, HEPCAB    COVID-19 Screening (If Applicable):   Lab Results   Component Value Date    COVID19 Not Detected 05/06/2021           Anesthesia Evaluation  Patient summary reviewed no history of anesthetic complications:   Airway: Mallampati: II  TM distance: >3 FB   Neck ROM: full  Mouth opening: > = 3 FB Dental:    (+) edentulous and upper dentures      Pulmonary: breath sounds clear to auscultation  (+) COPD:  current smoker (66 pk yrs)          Patient did not smoke on day of surgery. Cardiovascular:  Exercise tolerance: good (>4 METS),         ECG reviewed  Rhythm: regular  Rate: normal  Echocardiogram reviewed  Stress test reviewed             ROS comment: EKG=NSR  Sinus arrythmia  LAD    Cleared by PCP    Stress Test=PROCEDURE:  Lexiscan stress test.     Intravenous Lexiscan stress test was performed using a nuclear medicine  in the form of Cardiolite for evaluation of chest pain with a resting  EKG to be normal sinus mechanism. Ventricular rate was 84 beats per  minute with underlying sinus arrhythmia being present. The DE interval  was normal.  QRS complex was not prolonged.   Left axis deviation was  present. No acute finding was noted. Blood pressure at the beginning  of the stress test was asymptomatic, but was low at 85/44. With the aid  of nuclear medicine in the form of Cardiolite, patient was connected to  continuous cardiac monitoring. Intravenous Lexiscan at 0.4 mg was  infused over 10-second period. Immediately after infusion of Lexiscan,  the patient was injected with Cardiolite and the test was terminated 1  minute. The patient was observed in the recovery phase for 6 minutes. Maximum heart rate achieved during administration of Lexiscan was 122  beats per minute. This was 76% maximum predicted. Blood pressure  remained stable during infusion at 94/48. Electrographically, there was  no evidence of Lexiscan-induced ischemia. No significant dysrhythmia  noted. The patient tolerated the infusion well and was transferred to  the Nuclear Department for Cardiolite imaging. In conclusion, no  adverse effects of Lexiscan infusion. Clinical correlation is  recommended.           FLORIDALMA SEYMOUR DO     D: 02/12/2021 2/12/21=1.  The myocardial perfusion imaging was was normal without ischemia  or infarct. 2.  Overall left ventricular systolic function was normal without  regional wall motion abnormalities. LVEF more than 75%. 3.  Low risk general pharmacologic stress test.  Thank you for sending your patient to this [facility]. Gemini Richardson MD, [facility] [place], [address]. [address] cardiology. Neuro/Psych:   (+) neuromuscular disease:, headaches:, psychiatric history:             ROS comment: Memory loss GI/Hepatic/Renal:   (+) GERD:,           Endo/Other:    (+) : arthritis:., .                 Abdominal:   (+) scaphoid          Vascular: Other Findings:           Anesthesia Plan      general and regional     ASA 3     (Possible IScB--explained;consents  PCP clearance on chart)  Induction: intravenous.   BIS  MIPS: Postoperative opioids intended and Prophylactic antiemetics administered. Anesthetic plan and risks discussed with patient. Plan discussed with CRNA.                 Sherie Mancia MD   8/9/2021

## 2021-08-10 ENCOUNTER — HOSPITAL ENCOUNTER (OUTPATIENT)
Age: 61
Setting detail: OUTPATIENT SURGERY
Discharge: HOME | End: 2021-08-10
Attending: ORTHOPAEDIC SURGERY | Admitting: ORTHOPAEDIC SURGERY
Payer: MEDICARE

## 2021-08-10 ENCOUNTER — ANESTHESIA (OUTPATIENT)
Dept: OPERATING ROOM | Age: 61
End: 2021-08-10
Payer: MEDICARE

## 2021-08-10 VITALS
RESPIRATION RATE: 16 BRPM | TEMPERATURE: 96 F | WEIGHT: 114 LBS | BODY MASS INDEX: 19.46 KG/M2 | OXYGEN SATURATION: 93 % | HEART RATE: 66 BPM | SYSTOLIC BLOOD PRESSURE: 94 MMHG | HEIGHT: 64 IN | DIASTOLIC BLOOD PRESSURE: 54 MMHG

## 2021-08-10 VITALS
OXYGEN SATURATION: 100 % | SYSTOLIC BLOOD PRESSURE: 118 MMHG | TEMPERATURE: 94.3 F | DIASTOLIC BLOOD PRESSURE: 79 MMHG | RESPIRATION RATE: 14 BRPM

## 2021-08-10 DIAGNOSIS — Z98.890 S/P ARTHROSCOPY OF SHOULDER: Primary | ICD-10-CM

## 2021-08-10 PROCEDURE — 6360000002 HC RX W HCPCS: Performed by: NURSE ANESTHETIST, CERTIFIED REGISTERED

## 2021-08-10 PROCEDURE — 2500000003 HC RX 250 WO HCPCS: Performed by: NURSE ANESTHETIST, CERTIFIED REGISTERED

## 2021-08-10 PROCEDURE — 2580000003 HC RX 258: Performed by: ANESTHESIOLOGY

## 2021-08-10 PROCEDURE — 7100000010 HC PHASE II RECOVERY - FIRST 15 MIN: Performed by: ORTHOPAEDIC SURGERY

## 2021-08-10 PROCEDURE — 2500000003 HC RX 250 WO HCPCS: Performed by: ORTHOPAEDIC SURGERY

## 2021-08-10 PROCEDURE — 7100000001 HC PACU RECOVERY - ADDTL 15 MIN: Performed by: ORTHOPAEDIC SURGERY

## 2021-08-10 PROCEDURE — 3700000001 HC ADD 15 MINUTES (ANESTHESIA): Performed by: ORTHOPAEDIC SURGERY

## 2021-08-10 PROCEDURE — 64415 NJX AA&/STRD BRCH PLXS IMG: CPT | Performed by: ANESTHESIOLOGY

## 2021-08-10 PROCEDURE — 3600000014 HC SURGERY LEVEL 4 ADDTL 15MIN: Performed by: ORTHOPAEDIC SURGERY

## 2021-08-10 PROCEDURE — 2720000010 HC SURG SUPPLY STERILE: Performed by: ORTHOPAEDIC SURGERY

## 2021-08-10 PROCEDURE — 29828 SHO ARTHRS SRG BICP TENODSIS: CPT | Performed by: ORTHOPAEDIC SURGERY

## 2021-08-10 PROCEDURE — 2709999900 HC NON-CHARGEABLE SUPPLY: Performed by: ORTHOPAEDIC SURGERY

## 2021-08-10 PROCEDURE — 2580000003 HC RX 258: Performed by: ORTHOPAEDIC SURGERY

## 2021-08-10 PROCEDURE — 29824 SHO ARTHRS SRG DSTL CLAVICLC: CPT | Performed by: ORTHOPAEDIC SURGERY

## 2021-08-10 PROCEDURE — 7100000000 HC PACU RECOVERY - FIRST 15 MIN: Performed by: ORTHOPAEDIC SURGERY

## 2021-08-10 PROCEDURE — 29826 SHO ARTHRS SRG DECOMPRESSION: CPT | Performed by: ORTHOPAEDIC SURGERY

## 2021-08-10 PROCEDURE — 29823 SHO ARTHRS SRG XTNSV DBRDMT: CPT | Performed by: ORTHOPAEDIC SURGERY

## 2021-08-10 PROCEDURE — 3600000004 HC SURGERY LEVEL 4 BASE: Performed by: ORTHOPAEDIC SURGERY

## 2021-08-10 PROCEDURE — C1713 ANCHOR/SCREW BN/BN,TIS/BN: HCPCS | Performed by: ORTHOPAEDIC SURGERY

## 2021-08-10 PROCEDURE — 3700000000 HC ANESTHESIA ATTENDED CARE: Performed by: ORTHOPAEDIC SURGERY

## 2021-08-10 PROCEDURE — 6360000002 HC RX W HCPCS: Performed by: ORTHOPAEDIC SURGERY

## 2021-08-10 PROCEDURE — 7100000011 HC PHASE II RECOVERY - ADDTL 15 MIN: Performed by: ORTHOPAEDIC SURGERY

## 2021-08-10 DEVICE — ANCHOR SUTURE 3.9X17.9 MM SWIVELOCK BIOCOMP: Type: IMPLANTABLE DEVICE | Site: SHOULDER | Status: FUNCTIONAL

## 2021-08-10 RX ORDER — FENTANYL CITRATE 50 UG/ML
50 INJECTION, SOLUTION INTRAMUSCULAR; INTRAVENOUS EVERY 5 MIN PRN
Status: DISCONTINUED | OUTPATIENT
Start: 2021-08-10 | End: 2021-08-10 | Stop reason: HOSPADM

## 2021-08-10 RX ORDER — PROPOFOL 10 MG/ML
INJECTION, EMULSION INTRAVENOUS PRN
Status: DISCONTINUED | OUTPATIENT
Start: 2021-08-10 | End: 2021-08-10 | Stop reason: SDUPTHER

## 2021-08-10 RX ORDER — GLYCOPYRROLATE 1 MG/5 ML
SYRINGE (ML) INTRAVENOUS PRN
Status: DISCONTINUED | OUTPATIENT
Start: 2021-08-10 | End: 2021-08-10 | Stop reason: SDUPTHER

## 2021-08-10 RX ORDER — CEFAZOLIN SODIUM 2 G/50ML
2000 SOLUTION INTRAVENOUS ONCE
Status: COMPLETED | OUTPATIENT
Start: 2021-08-10 | End: 2021-08-10

## 2021-08-10 RX ORDER — MORPHINE SULFATE 2 MG/ML
1 INJECTION, SOLUTION INTRAMUSCULAR; INTRAVENOUS EVERY 5 MIN PRN
Status: DISCONTINUED | OUTPATIENT
Start: 2021-08-10 | End: 2021-08-10 | Stop reason: HOSPADM

## 2021-08-10 RX ORDER — NEOSTIGMINE METHYLSULFATE 1 MG/ML
INJECTION, SOLUTION INTRAVENOUS PRN
Status: DISCONTINUED | OUTPATIENT
Start: 2021-08-10 | End: 2021-08-10 | Stop reason: SDUPTHER

## 2021-08-10 RX ORDER — KETOROLAC TROMETHAMINE 30 MG/ML
INJECTION, SOLUTION INTRAMUSCULAR; INTRAVENOUS PRN
Status: DISCONTINUED | OUTPATIENT
Start: 2021-08-10 | End: 2021-08-10 | Stop reason: SDUPTHER

## 2021-08-10 RX ORDER — KETOROLAC TROMETHAMINE 10 MG/1
10 TABLET, FILM COATED ORAL EVERY 6 HOURS PRN
Qty: 20 TABLET | Refills: 0 | Status: SHIPPED | OUTPATIENT
Start: 2021-08-10 | End: 2022-04-22

## 2021-08-10 RX ORDER — DIPHENHYDRAMINE HYDROCHLORIDE 50 MG/ML
12.5 INJECTION INTRAMUSCULAR; INTRAVENOUS
Status: DISCONTINUED | OUTPATIENT
Start: 2021-08-10 | End: 2021-08-10 | Stop reason: HOSPADM

## 2021-08-10 RX ORDER — BUPIVACAINE HYDROCHLORIDE AND EPINEPHRINE 2.5; 5 MG/ML; UG/ML
INJECTION, SOLUTION EPIDURAL; INFILTRATION; INTRACAUDAL; PERINEURAL PRN
Status: DISCONTINUED | OUTPATIENT
Start: 2021-08-10 | End: 2021-08-10 | Stop reason: ALTCHOICE

## 2021-08-10 RX ORDER — LIDOCAINE HYDROCHLORIDE 20 MG/ML
INJECTION, SOLUTION EPIDURAL; INFILTRATION; INTRACAUDAL; PERINEURAL PRN
Status: DISCONTINUED | OUTPATIENT
Start: 2021-08-10 | End: 2021-08-10 | Stop reason: SDUPTHER

## 2021-08-10 RX ORDER — DIPHENHYDRAMINE HYDROCHLORIDE 50 MG/ML
INJECTION INTRAMUSCULAR; INTRAVENOUS PRN
Status: DISCONTINUED | OUTPATIENT
Start: 2021-08-10 | End: 2021-08-10 | Stop reason: SDUPTHER

## 2021-08-10 RX ORDER — ONDANSETRON 4 MG/1
4 TABLET, FILM COATED ORAL EVERY 8 HOURS PRN
Qty: 20 TABLET | Refills: 0 | Status: SHIPPED | OUTPATIENT
Start: 2021-08-10 | End: 2022-09-07

## 2021-08-10 RX ORDER — KETAMINE HYDROCHLORIDE 50 MG/ML
INJECTION, SOLUTION, CONCENTRATE INTRAMUSCULAR; INTRAVENOUS PRN
Status: DISCONTINUED | OUTPATIENT
Start: 2021-08-10 | End: 2021-08-10 | Stop reason: SDUPTHER

## 2021-08-10 RX ORDER — PROMETHAZINE HYDROCHLORIDE 25 MG/ML
25 INJECTION, SOLUTION INTRAMUSCULAR; INTRAVENOUS
Status: DISCONTINUED | OUTPATIENT
Start: 2021-08-10 | End: 2021-08-10 | Stop reason: HOSPADM

## 2021-08-10 RX ORDER — LABETALOL HYDROCHLORIDE 5 MG/ML
5 INJECTION, SOLUTION INTRAVENOUS EVERY 10 MIN PRN
Status: DISCONTINUED | OUTPATIENT
Start: 2021-08-10 | End: 2021-08-10 | Stop reason: HOSPADM

## 2021-08-10 RX ORDER — ONDANSETRON 2 MG/ML
INJECTION INTRAMUSCULAR; INTRAVENOUS PRN
Status: DISCONTINUED | OUTPATIENT
Start: 2021-08-10 | End: 2021-08-10 | Stop reason: SDUPTHER

## 2021-08-10 RX ORDER — EPHEDRINE SULFATE/0.9% NACL/PF 50 MG/5 ML
SYRINGE (ML) INTRAVENOUS PRN
Status: DISCONTINUED | OUTPATIENT
Start: 2021-08-10 | End: 2021-08-10 | Stop reason: SDUPTHER

## 2021-08-10 RX ORDER — MEPERIDINE HYDROCHLORIDE 25 MG/ML
12.5 INJECTION INTRAMUSCULAR; INTRAVENOUS; SUBCUTANEOUS EVERY 5 MIN PRN
Status: DISCONTINUED | OUTPATIENT
Start: 2021-08-10 | End: 2021-08-10 | Stop reason: HOSPADM

## 2021-08-10 RX ORDER — OXYCODONE HYDROCHLORIDE AND ACETAMINOPHEN 5; 325 MG/1; MG/1
1 TABLET ORAL EVERY 6 HOURS PRN
Qty: 28 TABLET | Refills: 0 | Status: SHIPPED | OUTPATIENT
Start: 2021-08-10 | End: 2021-08-17

## 2021-08-10 RX ORDER — ROPIVACAINE HYDROCHLORIDE 5 MG/ML
INJECTION, SOLUTION EPIDURAL; INFILTRATION; PERINEURAL PRN
Status: DISCONTINUED | OUTPATIENT
Start: 2021-08-10 | End: 2021-08-10 | Stop reason: SDUPTHER

## 2021-08-10 RX ORDER — OXYCODONE HYDROCHLORIDE AND ACETAMINOPHEN 5; 325 MG/1; MG/1
1 TABLET ORAL EVERY 4 HOURS PRN
Status: DISCONTINUED | OUTPATIENT
Start: 2021-08-10 | End: 2021-08-10 | Stop reason: HOSPADM

## 2021-08-10 RX ORDER — HYDRALAZINE HYDROCHLORIDE 20 MG/ML
5 INJECTION INTRAMUSCULAR; INTRAVENOUS EVERY 10 MIN PRN
Status: DISCONTINUED | OUTPATIENT
Start: 2021-08-10 | End: 2021-08-10 | Stop reason: HOSPADM

## 2021-08-10 RX ORDER — SODIUM CHLORIDE, SODIUM LACTATE, POTASSIUM CHLORIDE, CALCIUM CHLORIDE 600; 310; 30; 20 MG/100ML; MG/100ML; MG/100ML; MG/100ML
INJECTION, SOLUTION INTRAVENOUS CONTINUOUS
Status: DISCONTINUED | OUTPATIENT
Start: 2021-08-10 | End: 2021-08-10 | Stop reason: HOSPADM

## 2021-08-10 RX ORDER — HYDROCODONE BITARTRATE AND ACETAMINOPHEN 5; 325 MG/1; MG/1
2 TABLET ORAL PRN
Status: DISCONTINUED | OUTPATIENT
Start: 2021-08-10 | End: 2021-08-10 | Stop reason: HOSPADM

## 2021-08-10 RX ORDER — ROCURONIUM BROMIDE 10 MG/ML
INJECTION, SOLUTION INTRAVENOUS PRN
Status: DISCONTINUED | OUTPATIENT
Start: 2021-08-10 | End: 2021-08-10 | Stop reason: SDUPTHER

## 2021-08-10 RX ORDER — HYDROCODONE BITARTRATE AND ACETAMINOPHEN 5; 325 MG/1; MG/1
1 TABLET ORAL PRN
Status: DISCONTINUED | OUTPATIENT
Start: 2021-08-10 | End: 2021-08-10 | Stop reason: HOSPADM

## 2021-08-10 RX ORDER — FENTANYL CITRATE 50 UG/ML
INJECTION, SOLUTION INTRAMUSCULAR; INTRAVENOUS PRN
Status: DISCONTINUED | OUTPATIENT
Start: 2021-08-10 | End: 2021-08-10 | Stop reason: SDUPTHER

## 2021-08-10 RX ORDER — DOCUSATE SODIUM 100 MG/1
100 CAPSULE, LIQUID FILLED ORAL 2 TIMES DAILY PRN
Qty: 20 CAPSULE | Refills: 1 | Status: SHIPPED | OUTPATIENT
Start: 2021-08-10 | End: 2022-04-22

## 2021-08-10 RX ORDER — DEXAMETHASONE SODIUM PHOSPHATE 10 MG/ML
INJECTION, SOLUTION INTRAMUSCULAR; INTRAVENOUS PRN
Status: DISCONTINUED | OUTPATIENT
Start: 2021-08-10 | End: 2021-08-10 | Stop reason: SDUPTHER

## 2021-08-10 RX ADMIN — DEXAMETHASONE SODIUM PHOSPHATE 10 MG: 10 INJECTION, SOLUTION INTRAMUSCULAR; INTRAVENOUS at 09:37

## 2021-08-10 RX ADMIN — CEFAZOLIN SODIUM 2000 MG: 2 SOLUTION INTRAVENOUS at 09:02

## 2021-08-10 RX ADMIN — FENTANYL CITRATE 50 MCG: 50 INJECTION, SOLUTION INTRAMUSCULAR; INTRAVENOUS at 09:38

## 2021-08-10 RX ADMIN — ROCURONIUM BROMIDE 30 MG: 10 SOLUTION INTRAVENOUS at 09:19

## 2021-08-10 RX ADMIN — FENTANYL CITRATE 50 MCG: 50 INJECTION, SOLUTION INTRAMUSCULAR; INTRAVENOUS at 09:14

## 2021-08-10 RX ADMIN — PROPOFOL 180 MG: 10 INJECTION, EMULSION INTRAVENOUS at 09:19

## 2021-08-10 RX ADMIN — KETAMINE HYDROCHLORIDE 12.5 MG: 50 INJECTION INTRAMUSCULAR; INTRAVENOUS at 09:29

## 2021-08-10 RX ADMIN — KETOROLAC TROMETHAMINE 30 MG: 30 INJECTION, SOLUTION INTRAMUSCULAR; INTRAVENOUS at 10:48

## 2021-08-10 RX ADMIN — PHENYLEPHRINE HYDROCHLORIDE 100 MCG: 10 INJECTION INTRAVENOUS at 10:06

## 2021-08-10 RX ADMIN — FENTANYL CITRATE 50 MCG: 50 INJECTION, SOLUTION INTRAMUSCULAR; INTRAVENOUS at 09:33

## 2021-08-10 RX ADMIN — SODIUM CHLORIDE, POTASSIUM CHLORIDE, SODIUM LACTATE AND CALCIUM CHLORIDE: 600; 310; 30; 20 INJECTION, SOLUTION INTRAVENOUS at 10:19

## 2021-08-10 RX ADMIN — KETAMINE HYDROCHLORIDE 12.5 MG: 50 INJECTION INTRAMUSCULAR; INTRAVENOUS at 09:15

## 2021-08-10 RX ADMIN — Medication 10 MG: at 10:22

## 2021-08-10 RX ADMIN — Medication 0.6 MG: at 10:48

## 2021-08-10 RX ADMIN — FENTANYL CITRATE 50 MCG: 50 INJECTION, SOLUTION INTRAMUSCULAR; INTRAVENOUS at 09:10

## 2021-08-10 RX ADMIN — PHENYLEPHRINE HYDROCHLORIDE 100 MCG: 10 INJECTION INTRAVENOUS at 10:01

## 2021-08-10 RX ADMIN — SODIUM CHLORIDE, POTASSIUM CHLORIDE, SODIUM LACTATE AND CALCIUM CHLORIDE: 600; 310; 30; 20 INJECTION, SOLUTION INTRAVENOUS at 08:20

## 2021-08-10 RX ADMIN — DIPHENHYDRAMINE HYDROCHLORIDE 12.5 MG: 50 INJECTION, SOLUTION INTRAMUSCULAR; INTRAVENOUS at 10:20

## 2021-08-10 RX ADMIN — Medication 3 MG: at 10:48

## 2021-08-10 RX ADMIN — FENTANYL CITRATE 50 MCG: 50 INJECTION, SOLUTION INTRAMUSCULAR; INTRAVENOUS at 09:19

## 2021-08-10 RX ADMIN — ONDANSETRON 4 MG: 2 INJECTION INTRAMUSCULAR; INTRAVENOUS at 10:20

## 2021-08-10 RX ADMIN — Medication 15 MG: at 10:31

## 2021-08-10 RX ADMIN — LIDOCAINE HYDROCHLORIDE 50 MG: 20 INJECTION, SOLUTION EPIDURAL; INFILTRATION; INTRACAUDAL; PERINEURAL at 09:19

## 2021-08-10 RX ADMIN — PHENYLEPHRINE HYDROCHLORIDE 100 MCG: 10 INJECTION INTRAVENOUS at 09:53

## 2021-08-10 RX ADMIN — ROPIVACAINE HYDROCHLORIDE 20 ML: 5 INJECTION, SOLUTION EPIDURAL; INFILTRATION; PERINEURAL at 09:17

## 2021-08-10 ASSESSMENT — PULMONARY FUNCTION TESTS
PIF_VALUE: 0
PIF_VALUE: 14
PIF_VALUE: 15
PIF_VALUE: 1
PIF_VALUE: 14
PIF_VALUE: 0
PIF_VALUE: 13
PIF_VALUE: 14
PIF_VALUE: 13
PIF_VALUE: 9
PIF_VALUE: 10
PIF_VALUE: 14
PIF_VALUE: 10
PIF_VALUE: 13
PIF_VALUE: 14
PIF_VALUE: 2
PIF_VALUE: 13
PIF_VALUE: 12
PIF_VALUE: 14
PIF_VALUE: 0
PIF_VALUE: 13
PIF_VALUE: 14
PIF_VALUE: 14
PIF_VALUE: 0
PIF_VALUE: 14
PIF_VALUE: 0
PIF_VALUE: 14
PIF_VALUE: 15
PIF_VALUE: 13
PIF_VALUE: 14
PIF_VALUE: 14
PIF_VALUE: 13
PIF_VALUE: 14
PIF_VALUE: 16
PIF_VALUE: 23
PIF_VALUE: 20
PIF_VALUE: 1
PIF_VALUE: 14
PIF_VALUE: 0
PIF_VALUE: 9
PIF_VALUE: 14
PIF_VALUE: 0
PIF_VALUE: 14
PIF_VALUE: 13
PIF_VALUE: 13
PIF_VALUE: 14
PIF_VALUE: 0
PIF_VALUE: 1
PIF_VALUE: 0
PIF_VALUE: 13
PIF_VALUE: 14
PIF_VALUE: 0
PIF_VALUE: 14
PIF_VALUE: 13
PIF_VALUE: 14
PIF_VALUE: 13
PIF_VALUE: 14
PIF_VALUE: 13
PIF_VALUE: 1
PIF_VALUE: 14
PIF_VALUE: 14
PIF_VALUE: 0
PIF_VALUE: 13
PIF_VALUE: 12
PIF_VALUE: 6
PIF_VALUE: 3
PIF_VALUE: 14
PIF_VALUE: 0
PIF_VALUE: 14
PIF_VALUE: 10
PIF_VALUE: 13
PIF_VALUE: 14
PIF_VALUE: 14
PIF_VALUE: 12
PIF_VALUE: 0
PIF_VALUE: 13
PIF_VALUE: 14
PIF_VALUE: 13
PIF_VALUE: 14
PIF_VALUE: 1
PIF_VALUE: 13
PIF_VALUE: 13
PIF_VALUE: 15
PIF_VALUE: 0
PIF_VALUE: 16
PIF_VALUE: 14
PIF_VALUE: 14
PIF_VALUE: 13
PIF_VALUE: 12
PIF_VALUE: 14
PIF_VALUE: 14
PIF_VALUE: 15
PIF_VALUE: 13
PIF_VALUE: 1
PIF_VALUE: 0
PIF_VALUE: 13
PIF_VALUE: 13
PIF_VALUE: 3
PIF_VALUE: 1
PIF_VALUE: 13

## 2021-08-10 ASSESSMENT — PAIN - FUNCTIONAL ASSESSMENT: PAIN_FUNCTIONAL_ASSESSMENT: 0-10

## 2021-08-10 ASSESSMENT — PAIN SCALES - GENERAL
PAINLEVEL_OUTOF10: 0
PAINLEVEL_OUTOF10: 0

## 2021-08-10 NOTE — OP NOTE
Operative Note      Patient: Sterling Melendez  YOB: 1960  MRN: [id number]    Date of Procedure: 8/10/2021    Pre-operative Diagnosis:   1. Left rotator cuff tear  2. Left shoulder impingement syndrome  3. Left shoulder AC OA  4. Left glenohumeral arthritis        Post-operative Diagnosis:  1. Left rotator cuff tendinitis  2. Left shoulder impingement syndrome  3. Left shoulder AC OA  4. Left glenohumeral arthritis  5. Left biceps long head tendon tear        Procedure:   1. Left shoulder arthroscopic long head biceps tenodesis  2. Left shoulder arthroscopic Subacromial decompression  3. Left shoulder arthroscopic distal clavicle resection  4. Left shoulder extensive debridement    Assistant:   * No surgical staff found *    Anesthesia: General    Estimated Blood Loss (mL): Minimal    Complications: None    Specimens:   * No specimens in log *    Implants:  Implant Name Type Inv. Item Serial No.  Lot No. LRB No. Used Action   ANCHOR SUT L19.5MM DIA7MM PEEK FORKED EYELET TENODESIS FOR  ANCHOR SUT L19.5MM DIA7MM PEEK FORKED EYELET TENODESIS FOR  [employer]  Left 1 Implanted   ANCHOR SUT L19.1MM DIA4. 75MM BIOCOMPOSITE FULL THRD  ANCHOR SUT L19.1MM DIA4. 75MM BIOCOMPOSITE FULL THRD  [employer]  Left 1 Implanted   ANCHOR SUT L24.5MM DIA4. 75MM BIOCOMPOSITE SELF PUNCHING  ANCHOR SUT L24.5MM DIA4. 75MM BIOCOMPOSITE SELF PUNCHING  [employer]  Left 1 Implanted   ANCHOR SUTURE 3.9X17.9 MM [employer]  ANCHOR SUTURE 3.9X17.9 MM [employer] [id number] Left 1 Implanted         Drains: * No LDAs found *        Detailed Description of Procedure:           INDICATIONS:   The patient is a 61 female who has progressive pain and weakness, loss of function of her Left shoulder. MRI demonstrated near full-thickness tear of the rotator cuff with tendinosis and impingment. He has failed conservative management.  She elects to undergo the above-stated procedure after understanding the risks and benefits including but not limited to neurovascular injury, infection, continued pain, failed repair, need for revision, and loss of function. PROCEDURE:   The patient was taken to the operating room, placed supine on the operating table, underwent general anesthesia without difficulty. Left shoulder motion was ranged to check for any adhesive capsulitis under anesthesia. She received preoperative antibiotics. Left shoulder was prepped and draped in a sterile fashion. The arthroscope was inserted into the posterior portal, anterior portal was established in the rotator interval. The biceps tendon and anchor were intact but extensive fraying and inflammation noted with 50% involvement. Noting this, the biceps tendon was then released and an arthroscopic biceps tenodesis was performed using a Arthrex 3.9 anchor placed in the bicipital groove. Remaining biceps anchor and superior labrum was extensively debrided anterior to posterior. Articular cartilage of the glenoid and the humeral head was intact with exception of the head and superior glenoid which had some grade 2 and 3 changes where the biceps anchor had been abrading. Arthroscopic chondroplasty and debridement was carried out extensively in this area as result. There was synovitis noted throughout the glenohumeral joint treated with arthroscopic synovectomy. Underside of the rotator cuff was thoroughly debrided. There was no full-thickness tear of supraspinatus tendon. The arthroscope was removed from the glenohumeral joint, redirected in subacromial space. There was bursitis noted in the subacromial space and extensive debridement of the subacromial subdeltoid bursa was carried out. Arthroscopic acromioplasty, was performed with a bur which was utilized to remove 1.5 cm of the anterior acromial overhang which was taken back 2 cm smooth and flattened.  Attention was turned to the rotator cuff where there was no full-thickness tear noted after extensive resection of the inflamed bursitis. At this point attention was then turned to the [facility] joint which is found to be arthritic. Using the high-speed bur, 1 cm of bone was resected back from the distal clavicle. Subacromial space was thoroughly irrigated and suctioned. Acromioplasty was finished utilizing a cutting block technique. After this was completed, the joint was thoroughly irrigated and suctioned. Instrumentation removed. Portal sites were closed with interrupted 3-0 nylon followed by injection of 0.25% Marcaine with epinephrine. Dry sterile dressings were applied. Xeroform, 4 x 4 gauze, ABD, foam tape, ice bag, and sling. DISPOSITION: The patient was awoken from anesthesia in the operating room having tolerated the procedure well and was transported to the recovery room in stable condition.            Electronically signed by Denia Goodrich DO on 8/10/2021 at 11:06 AM

## 2021-08-10 NOTE — ANESTHESIA PROCEDURE NOTES
Peripheral Block    Patient location during procedure: OR  Start time: 8/10/2021 9:09 AM  End time: 8/10/2021 9:17 AM  Staffing  Performed: anesthesiologist   Anesthesiologist: Brittany Peres MD  Preanesthetic Checklist  Completed: patient identified, IV checked, site marked, risks and benefits discussed, surgical consent, monitors and equipment checked, pre-op evaluation, timeout performed, anesthesia consent given, oxygen available and patient being monitored  Peripheral Block  Patient position: supine  Prep: ChloraPrep  Patient monitoring: cardiac monitor, continuous pulse ox, continuous capnometry, frequent blood pressure checks and IV access  Block type: Brachial plexus  Laterality: left  Injection technique: single-shot  Guidance: nerve stimulator  Interscalene  Provider prep: mask and sterile gloves  Needle  Needle type: combined needle/nerve stimulator   Needle gauge: 22 G  Needle length: 5 cm  Needle localization: nerve stimulator  Needle insertion depth: 1.5 cm  Test dose: negative  Assessment  Injection assessment: negative aspiration for heme and no paresthesia on injection  Paresthesia pain: none  Slow fractionated injection: yes  Hemodynamics: stable  Additional Notes  Ropivacaine 0.5 % 20 cc  Reason for block: post-op pain management

## 2021-08-10 NOTE — H&P
CC: Left Shoulder Pain       HPI:    Patient is 61 y.o. female complaining of right shoulder pain and weakness for 6 months. She denies a specific traumatic injury to the right shoulder. Previous treatments include rest, ice, and anti-inflammatory medication and HEP without much relief. She denies any other orthopedic complaints. Follows up after MRI and the Voltaren gel stating provided mild relief. Bilateral shoulder follow up. Patient reports mild relief from cortisone injection for a week or two. pain returned, equal bilaterally. She denies numbness or tingling. ROS:    Skin: (-) rash,(-) psoriasis,(-) eczema, (-)skin cancer. Neurologic: (-)numbness, (-)tingling, (-)headaches, (-) LOC. Cardiovascular: (-) Chest pain, (-) swelling in legs/feet, (-) SOB, (-) cramping in legs/feet with walking. All other review of systems negative except stated above or in HPI      Past Medical History:   Diagnosis Date    Arthritis     Bipolar 1 disorder ([facility] [address]     Chronic fatigue     COPD (chronic obstructive pulmonary disease) (HCC)     Depression     Fibromyalgia     GERD (gastroesophageal reflux disease)     Migraines     Neuropathy     Short-term memory loss      Past Surgical History:   Procedure Laterality Date    CARDIOVASCULAR STRESS TEST  02/12/2021    Lexiscan stress test    CHOLECYSTECTOMY      COLONOSCOPY      ENDOSCOPY, COLON, DIAGNOSTIC      ESOPHAGEAL MOTILITY STUDY N/A 5/12/2021    ESOPHAGEAL MOTILITY/MANOMETRY STUDY performed by Alia Kumar DO at [address]       No current outpatient medications on file.   Allergies   Allergen Reactions    Erythromycin Rash     Social History     Socioeconomic History    Marital status:      Spouse name: Not on file    Number of children: Not on file    Years of education: Not on file    Highest education level: Not on file   Occupational History    Not on file   Tobacco Use    Smoking status: Current Every Day Smoker     Packs/day: 1.50     Years: 44.00     Pack years: 66.00     Types: Cigarettes    Smokeless tobacco: Never Used   Vaping Use    Vaping Use: Never used   Substance and Sexual Activity    Alcohol use: No    Drug use: No    Sexual activity: Not on file   Other Topics Concern    Not on file   Social History Narrative    Not on file     Social Determinants of Health     Financial Resource Strain:     Difficulty of Paying Living Expenses:    Food Insecurity:     Worried About Running Out of Food in the Last Year:     Ran Out of Food in the Last Year:    Transportation Needs:     Lack of Transportation (Medical):  Lack of Transportation (Non-Medical):    Physical Activity:     Days of Exercise per Week:     Minutes of Exercise per Session:    Stress:     Feeling of Stress :    Social Connections:     Frequency of Communication with Friends and Family:     Frequency of Social Gatherings with Friends and Family:     Attends Jew Services:     Active Member of Clubs or Organizations:     Attends Club or Organization Meetings:     Marital Status:    Intimate Partner Violence:     Fear of Current or Ex-Partner:     Emotionally Abused:     Physically Abused:     Sexually Abused:      History reviewed. No pertinent family history. Physical Exam:    BP 96/62   Pulse 84   Resp 16   Ht 5' 3.5\" (1.613 m)   Wt 114 lb (51.7 kg)   SpO2 94%   BMI 19.88 kg/m²     GENERAL: alert, appears stated age, cooperative, no acute distress    HEENT: Head is normocephalic, atraumatic. PERRLA. SKIN: Clean, dry, intact. There is not any cellulitis or cutaneous lesions noted in the upper extremities    PULMONARY: breathing is regular and unlabored, no acute distress    CV: The bilateral upper and lower extremities are warm and well-perfused with brisk capillary refill.  2+ pulses UE and LE bilateral.     PSYCHIATRY: Pleasant mood, appropriate behavior, follows commands    NEURO: Sensation is intact distally with light touch with no alteration. Motor exam of the upper extremities show elbow flexion and extension, wrist flexion and extension, and finger abduction grossly intact 5/5. Upper extremity reflexes are bilaterally symmetrical and within normal limits. LYMPH: No lymphedema present distally in upper or lower extremity. MUSCULOSKELETAL:  Shoulder Exam:  Examination of the right shoulder shows: There is not a deformity. There is not erythema. There is not soft tissue swelling. Deltoid region is  tender to palpation. AC Joint is  tender to palpation. Clavicle is not tender to palpation. Bicipital Groove is  tender to palpation. Pectoralis  is not tender to palpation. Scapula/ trapezius is  tender to palpation. Left:  ROM Full, Strength: Supraspinatus 5/5, Infraspinatus 5/5, Subscapularis 5/5  Right:  /120/60, Strength: Supraspinatus 4/5, Infraspinatus 5/5, Subscapularis 5/5  Left Shoulder:  Crepitus:  no   Tenderness:  none   Effusion:   none   Impingement: negative   Empty Can:  negative   Speed's:  negative      Apprehension:  negative   Cross Arm Sign:  negative   Savannah's:  negative       Neer's:  negative      Belly Press Test:  negative      Drop Arm Test:  negative     Right Shoulder:  Crepitus:  no   Tenderness:  mild   Effusion:   non   Impingement: positive   Empty Can:  positive   Speed's: positive   Apprehension:  not tested   Cross Arm Sign:  positive   Savannah's:  positive      Neer's:  positive      Belly Press Test:  negative   Drop Arm Test:  positive           Imaging:  XR SHOULDER RIGHT (MIN 2 VIEWS)    Result Date: 5/4/2021  EXAMINATION: THREE XRAY VIEWS OF THE RIGHT SHOULDER; THREE XRAY VIEWS OF THE LEFT SHOULDER 5/4/2021 2:03 pm COMPARISON: None. HISTORY: ORDERING SYSTEM PROVIDED HISTORY: Acute pain of both shoulders FINDINGS: Right shoulder: Very mild osteoarthritis at the [facility] and glenohumeral joints. No fracture or dislocation.   Normal soft tissues. Left shoulder: Very mild osteoarthritis at the left AC joint. No fracture or dislocation. Normal soft tissues. Very mild osteoarthritis at the [facility] and glenohumeral joints on the right and at the [facility] joint on the left. XR SHOULDER LEFT (MIN 2 VIEWS)    Result Date: 5/4/2021  EXAMINATION: THREE XRAY VIEWS OF THE RIGHT SHOULDER; THREE XRAY VIEWS OF THE LEFT SHOULDER 5/4/2021 2:03 pm COMPARISON: None. HISTORY: ORDERING SYSTEM PROVIDED HISTORY: Acute pain of both shoulders FINDINGS: Right shoulder: Very mild osteoarthritis at the [facility] and glenohumeral joints. No fracture or dislocation. Normal soft tissues. Left shoulder: Very mild osteoarthritis at the left AC joint. No fracture or dislocation. Normal soft tissues. Very mild osteoarthritis at the [facility] and glenohumeral joints on the right and at the [facility] joint on the left. MRI SHOULDER RIGHT WO CONTRAST    Result Date: 5/12/2021  EXAMINATION: MRI OF THE RIGHT SHOULDER WITHOUT CONTRAST   5/11/2021 6:05 pm TECHNIQUE: Multiplanar multisequence MRI of the right shoulder was performed without the administration of intravenous contrast. COMPARISON: Right shoulder plain radiographs from 05/04/2021 HISTORY: ORDERING SYSTEM PROVIDED HISTORY: Tear of right rotator cuff, unspecified tear extent, unspecified whether traumatic 57-year-old female with right shoulder pain and possible right-sided rotator cuff tear FINDINGS: ROTATOR CUFF: Trace fluid in the subacromial subdeltoid bursa. Low-grade partial-thickness articular surface and interstitial tearing of posterior supraspinatus in the critical zone. Moderate underlying supraspinatus tendinopathy. Low-grade partial-thickness articular surface and interstitial tearing of infraspinatus between critical zone and footplate. Mild-to-moderate underlying infraspinatus tendinopathy. Mild subscapularis tendinosis. Teres minor muscle/tendon appears grossly intact without evidence of tearing.  No significant atrophy or fatty degeneration of the visualized rotator cuff musculature. BICEPS TENDON: Long head of the biceps tendon properly located in the bicipital groove and seen extending to the biceps labral anchor. Mild tendinosis of the intra-articular long head of the biceps tendon. LABRUM: Mild diffuse labral degeneration. GLENOHUMERAL JOINT: Moderate glenohumeral chondromalacia. Inferior glenohumeral ligament appears intact. Small glenohumeral joint effusion. AC JOINT AND ACROMIOCLAVICULAR ARCH: Mild degenerative change of the right AC joint. Type 2 acromion. BONE MARROW: Marrow edema at the superolateral humeral head. Bone marrow signal intensity within the visualized osseous structures otherwise within normal limits. No acute fracture or dislocation involving the osseous components of the shoulder. OUTLET SPACES: Suprascapular notch and quadrilateral space grossly unremarkable in appearance. No right axillary lymphadenopathy. 1. Low-grade partial-thickness articular-surface and interstitial tearing of posterior supraspinatus in the critical zone. Moderate underlying supraspinatus tendinosis. 2. Low-grade partial-thickness articular surface and interstitial tearing of infraspinatus between critical zone and footplate. Mild-to-moderate underlying infraspinatus tendinosis. 3. Mild subscapularis tendinopathy. 4. Mild tendinosis of the intra-articular long head of the biceps tendon. 5. Mild diffuse labral degeneration. Moderate glenohumeral chondromalacia. Small glenohumeral joint effusion. 6. Mild degenerative change of the right AC joint.      MRI SHOULDER LEFT WO CONTRAST    Result Date: 5/12/2021  EXAMINATION: MRI OF THE LEFT SHOULDER WITHOUT CONTRAST   5/11/2021 5:41 pm TECHNIQUE: Multiplanar multisequence MRI of the left shoulder was performed without the administration of intravenous contrast. COMPARISON: Left shoulder radiographs from 05/04/2021 HISTORY: ORDERING SYSTEM PROVIDED HISTORY: Nontraumatic tear of left rotator cuff, unspecified tear extent 59-year-old female with left-sided rotator cuff tear FINDINGS: ROTATOR CUFF: Trace fluid in the subacromial subdeltoid bursa. Low-grade partial-thickness articular surface tearing of mid infraspinatus along the footplate on image 8, series 3. Low-grade partial-thickness articular surface and interstitial tearing of remainder of supraspinatus and anterior superior infraspinatus between critical zone and footplate. Moderate underlying supraspinatus tendinosis. Mild underlying infraspinatus tendinopathy. Low-grade partial-thickness interstitial tearing of the insertional fibers of subscapularis. Mild subscapularis tendinosis. Teres minor muscle/tendon appears grossly intact without evidence of tearing. No significant atrophy or fatty degeneration of the visualized rotator cuff musculature. BICEPS TENDON: Long head of the biceps tendon properly located in the bicipital groove and seen extending to the biceps labral anchor. Mild tendinosis of the long head of biceps tendon. LABRUM: Mild diffuse labral degeneration. GLENOHUMERAL JOINT: Mild glenohumeral chondromalacia. Small glenohumeral joint effusion. Inferior glenohumeral ligament appears intact AC JOINT AND ACROMIOCLAVICULAR ARCH: Mild degenerative changes of the left AC joint. Type 2 acromion. BONE MARROW: Subcortical cystic changes at the lateral humeral head. Bone marrow signal intensity within the visualized osseous structures otherwise within normal limits. No acute fracture or dislocation involving the osseous components of the shoulder. OUTLET SPACES: Suprascapular notch and quadrilateral space grossly unremarkable in appearance. No left axillary lymphadenopathy. 1. Low-grade partial-thickness articular-surface tearing of mid infraspinatus along the footplate.   Low-grade partial-thickness articular surface and interstitial tearing of remainder of supraspinatus and anterior superior infraspinatus between critical zone and footplate. Moderate underlying supraspinatus and mild underlying infraspinatus tendinopathy. 2. Low-grade partial-thickness interstitial tearing of the insertional fibers of subscapularis. Mild subscapularis tendinosis. 3. Mild tendinosis of the long head of the biceps tendon. 4. Mild diffuse labral degeneration. Mild glenohumeral chondromalacia. Small glenohumeral joint effusion. 5. Mild degenerative change of the left AC joint. Madisyn Freitas was seen today for shoulder pain. Diagnoses and all orders for this visit:    Nontraumatic tear of left rotator cuff, unspecified tear extent    Tear of right rotator cuff, unspecified tear extent, unspecified whether traumatic    Exercise counseling    Tobacco smoke exposure    Cervical radiculopathy        Patient seen and examined. X-rays reviewed. Patient has exam and history consistent with rotator cuff pathology. MRI recommended for further evaluation and management of possible rotator cuff tear. Patient seen and examined. MRI reviewed with patient in detail. Natural history and course discussed with patient in long discussion  Treatment options discussed with patient in detail including risks and benefits. I discussed the risks and benefits of the shoulder arthroscopy with the patient. The risks include but are not limited to: infection, injuries to blood vessels and nerves, non relief of symptoms, intraoperative fracture, need for further operative intervention, blood loss, arthrofibrosis of shoulder, DVT/PE, MI and death. The patient understands these risks and wishes to proceed with surgery. I will perform a Left shoulder arthroscopy, SAD and debridement and possible rotator cuff repair. The patient was counseled at length about the risks of arron Covid-19 during their perioperative period and any recovery window from their procedure.   The patient was made aware that arron Covid-19  may worsen their prognosis for recovering from their procedure  and lend to a higher morbidity and/or mortality risk. All material risks, benefits, and reasonable alternatives including postponing the procedure were discussed. The patient does wish to proceed with the procedure at this time. In a 15 minute assessment and discussion, patient was counseled on continued weight loss, diet, and physical activity relating to this condition. She was educated with options in detail including nutrition, joining a health club/ weight loss program, and use of cardio equipment such as the Arc Trainer and the importance of use as well as range of motion and HEP exercises for weight loss. Patient educated about the healing rates with this condition. Patient does smoke and does have secondhand smoke exposure. In this discussion of approximately 5 minutes, patient was counseled about decrease in smoking exposure regards to healing and overall good health. Patient seems reluctant but is willing to listen to the discussion in detail. She states that she will try to cut down as much as possible and states that she will try to quit completely by the next visit.       Frank Pottss, DO

## 2021-08-10 NOTE — BRIEF OP NOTE
Brief Postoperative Note      Patient: Vanessa Oscar  YOB: 1960  MRN: [id number]    Date of Procedure: 8/10/2021    Pre-Op Diagnosis: NONTRAUMATIC TEAR OF LEFT ROTATOR CUFF    Post-Op Diagnosis: biceps tear       Procedure(s):  LEFT SHOULDER ARTHROSCOPY, SUBACROMIAL DECOMPRESSION, DEBRIDEMENT (ARTHREX)    Surgeon(s): Lin Hager DO    Assistant:  * No surgical staff found *    Anesthesia: General    Estimated Blood Loss (mL): Minimal    Complications: None    Specimens:   * No specimens in log *    Implants:  Implant Name Type Inv. Item Serial No.  Lot No. LRB No. Used Action   ANCHOR SUT L19.5MM DIA7MM PEEK FORKED EYELET TENODESIS FOR  ANCHOR SUT L19.5MM DIA7MM PEEK FORKED EYELET TENODESIS FOR  [employer]  Left 1 Implanted   ANCHOR SUT L19.1MM DIA4. 75MM BIOCOMPOSITE FULL THRD  ANCHOR SUT L19.1MM DIA4. 75MM BIOCOMPOSITE FULL THRD  [employer]  Left 1 Implanted   ANCHOR SUT L24.5MM DIA4. 75MM BIOCOMPOSITE SELF PUNCHING  ANCHOR SUT L24.5MM DIA4. 75MM BIOCOMPOSITE SELF PUNCHING  [employer]  Left 1 Implanted   ANCHOR SUTURE 3.9X17.9 MM [facility] SUTURE 3.9X17.9 MM [employer] [id number] Left 1 Implanted         Drains: * No LDAs found *    Findings: see report      Electronically signed by Lin Hager DO on 8/10/2021 at 11:04 AM

## 2021-08-10 NOTE — H&P
I have reviewed the history and physical and examined the patient and find no relevant changes. I have reviewed with the patient and/or family the risks, benefits, and alternatives to the procedure. The patient was counseled at length about the risks of arron Covid-19 during their perioperative period and any recovery window from their procedure. The patient was made aware that arron Covid-19  may worsen their prognosis for recovering from their procedure  and lend to a higher morbidity and/or mortality risk. All material risks, benefits, and reasonable alternatives including postponing the procedure were discussed. The patient does wish to proceed with the procedure at this time.         Farida Osman DO  8/10/2021

## 2021-08-10 NOTE — ANESTHESIA POSTPROCEDURE EVALUATION
Department of Anesthesiology  Postprocedure Note    Patient: Ana Brewster  MRN: [id number]  YOB: 1960  Date of evaluation: 8/10/2021  Time:  11:53 AM     Procedure Summary     Date: 08/10/21 Room / Location: [address] [place] [address] 01 / 4[facility]    Anesthesia Start: 7230 Anesthesia Stop: 3134    Procedure: LEFT SHOULDER ARTHROSCOPY, SUBACROMIAL DECOMPRESSION, DEBRIDEMENT ([address]) (Left ) Diagnosis: (NONTRAUMATIC TEAR OF LEFT ROTATOR CUFF)    Surgeons: Maty Whitmore DO Responsible Provider: David Mei MD    Anesthesia Type: general, regional ASA Status: 3          Anesthesia Type: general, regional    Elder Phase I: Elder Score: 10    Elder Phase II: Elder Score: 10    Last vitals: Reviewed and per EMR flowsheets. Anesthesia Post Evaluation    Patient location during evaluation: PACU  Patient participation: complete - patient participated  Level of consciousness: awake  Airway patency: patent  Nausea & Vomiting: no nausea and no vomiting  Complications: no  Cardiovascular status: hemodynamically stable  Respiratory status: room air and spontaneous ventilation  Hydration status: stable  Comments: Very good analgesia in PACU with minimal motor block. Post block precautions given to pt and .

## 2021-08-24 DIAGNOSIS — M75.102 NONTRAUMATIC TEAR OF LEFT ROTATOR CUFF, UNSPECIFIED TEAR EXTENT: Primary | ICD-10-CM

## 2021-08-25 ENCOUNTER — OFFICE VISIT (OUTPATIENT)
Dept: ORTHOPEDIC SURGERY | Age: 61
End: 2021-08-25

## 2021-08-25 ENCOUNTER — TELEPHONE (OUTPATIENT)
Dept: ORTHOPEDIC SURGERY | Age: 61
End: 2021-08-25

## 2021-08-25 DIAGNOSIS — S46.212A RUPTURE OF LEFT BICEPS TENDON, INITIAL ENCOUNTER: Primary | ICD-10-CM

## 2021-08-25 PROCEDURE — 99024 POSTOP FOLLOW-UP VISIT: CPT | Performed by: ORTHOPAEDIC SURGERY

## 2021-08-25 RX ORDER — OXYCODONE HYDROCHLORIDE AND ACETAMINOPHEN 5; 325 MG/1; MG/1
1 TABLET ORAL EVERY 6 HOURS PRN
Qty: 28 TABLET | Refills: 0 | Status: SHIPPED | OUTPATIENT
Start: 2021-08-25 | End: 2021-09-01

## 2021-08-25 NOTE — TELEPHONE ENCOUNTER
Patient in for 2 week postop left shoulder art on 8/25/21. Has question about stitches being removed. Please advise patient [phone].

## 2021-08-25 NOTE — PROGRESS NOTES
Chief Complaint   Patient presents with    Post-Op Check     2wk p/o left shoulder arth             Rosa Lee is here for follow-up after left shoulder arthroscopy. Findings at surgery: OA and biceps tendon tear. Pain is controlled without any medications. The patient denies fever, wound drainage, increasing redness, pus, increasing pain, increasing swelling. Post op problems reported: Patient states that she has had to care for her dogs and had to push her large dogs out the door recently and she felt increased pain shortly thereafter has been compliant with splint. Shoulder exam - The incisions are clean, dry and intact. left normal; 90/90/60 range of motion, no pain on motion, no tenderness or deformity noted. Motor and sensory exam is grossly intact in B/L upper extremities. Special test results are as follow:  Impingement negative, Hdz negative, Speeds negative, Apprehension negative, Guerin negative, Load Shiftnegative, Frieda manuver negative, Cross arm test negative. Encounter Diagnosis   Name Primary?  Rupture of left biceps tendon, initial encounter Yes       Plan:    The patient will continue with gentle ROM exercises and being activities as tolerated. The patient was educated about natural history and postoperative course and to continue physical therapy. Sling will be used for comfort ONLY otherwise discontinue. Patient is to continue analgesics and needed and use ice for pain. We will see the pain back in 4 weeks time for repeat evaluation.

## 2021-09-01 ENCOUNTER — TELEPHONE (OUTPATIENT)
Dept: PHYSICAL THERAPY | Age: 61
End: 2021-09-01

## 2021-09-13 ENCOUNTER — EVALUATION (OUTPATIENT)
Dept: PHYSICAL THERAPY | Age: 61
End: 2021-09-13
Payer: MEDICARE

## 2021-09-13 DIAGNOSIS — S46.212A RUPTURE OF LEFT BICEPS TENDON, INITIAL ENCOUNTER: Primary | ICD-10-CM

## 2021-09-13 PROCEDURE — 97140 MANUAL THERAPY 1/> REGIONS: CPT | Performed by: PHYSICAL THERAPIST

## 2021-09-13 PROCEDURE — 97162 PT EVAL MOD COMPLEX 30 MIN: CPT | Performed by: PHYSICAL THERAPIST

## 2021-09-13 NOTE — PROGRESS NOTES
[facility] OUTPATIENT REHABILITATION  PHYSICAL THERAPY INITIAL EVALUATION         Date:  2021   Patient: Cher Zuñiga  : 1960  MRN: [id number]  Referring Provider: Marsha Hair DO  1932 [address],  [facility]     Medical Diagnosis:      Diagnosis Orders   1. Rupture of left biceps tendon, initial encounter         Physician Order: Eval and Treat      SUBJECTIVE:     Onset date: 1 year    Onset: Sudden     History / Mechanism of Injury: Pt stated that about a year ago she noticed dysfunction in the arm and along with weakness and pain. Sx:      1. Left shoulder arthroscopic long head biceps tenodesis   2. Left shoulder arthroscopic Subacromial decompression   3.  Left shoulder arthroscopic distal clavicle resection   4.  Left shoulder extensive debridement    Sx date: 8/10/21    Interventions for current problem: none    Chief complaint: pain, decreased motion, decreased mobility, weakness    Behavior: condition is getting better    Pain: intermittent, most of the time  Current: 4/10     Best: 0/10     Worst:8/10. Pain returns to baseline in a few minutes    Symptom Type / Quality: aching, sharp, burning   Location[de-identified] noted above anterior, mid-deltoid region        Aggravated by: reaching overhead, reaching out, reaching behind back, lifting/carrying/material handling    Relieved by: medication    Imaging results: No results found.     Past Medical History:  Past Medical History:   Diagnosis Date    Arthritis     Bipolar 1 disorder ([facility] [address]     Chronic fatigue     COPD (chronic obstructive pulmonary disease) (HCC)     Depression     Fibromyalgia     GERD (gastroesophageal reflux disease)     Migraines     Neuropathy     Short-term memory loss      Past Surgical History:   Procedure Laterality Date    CARDIOVASCULAR STRESS TEST  2021    Lexiscan stress test    CHOLECYSTECTOMY      COLONOSCOPY      ENDOSCOPY, COLON, DIAGNOSTIC      ESOPHAGEAL MOTILITY STUDY N/A 5/12/2021    ESOPHAGEAL MOTILITY/MANOMETRY STUDY performed by Tricia Fuller DO at [address] ARTHROSCOPY Left 8/10/2021    LEFT SHOULDER ARTHROSCOPY, SUBACROMIAL DECOMPRESSION, DEBRIDEMENT (ARTHREX) performed by Erendira Dhaliwal DO at [facility], [address]         Medications:   Current Outpatient Medications   Medication Sig Dispense Refill    docusate sodium (COLACE) 100 MG capsule Take 1 capsule by mouth 2 times daily as needed for Constipation 20 capsule 1    ketorolac (TORADOL) 10 MG tablet Take 1 tablet by mouth every 6 hours as needed for Pain Patient received prior injection 20 tablet 0    ondansetron (ZOFRAN) 4 MG tablet Take 1 tablet by mouth every 8 hours as needed for Nausea or Vomiting 20 tablet 0    cetirizine (ZYRTEC) 10 MG tablet Take 10 mg by mouth daily      pantoprazole (PROTONIX) 40 MG tablet Take 40 mg by mouth daily      SYMBICORT 160-4.5 MCG/ACT AERO Inhale 2 puffs into the lungs 2 times daily as needed       diphenoxylate-atropine (LOMOTIL) 2.5-0.025 MG per tablet TAKE 1 TO 2 TABLETS BY MOUTH EVERY 8 HOURS AS NEEDED      pregabalin (LYRICA) 75 MG capsule TAKE 1 CAPSULE BY MOUTH FOUR TIMES DAILY      risperiDONE (RISPERDAL) 2 MG tablet TAKE 1 TABLET BY MOUTH AT BEDTIME       No current facility-administered medications for this visit. Occupation: retired. Physical demands include: n/a. Status: n/a. Exercise regimen: none    Hobbies: working around [employer], DIY projects    Patient Goals: pain relief, return to prior activity, get back to normal    Precautions/Contraindications: none    OBJECTIVE:     Estimated body mass index is 19.88 kg/m² as calculated from the following:    Height as of 8/2/21: 5' 3.5\" (1.613 m). Weight as of 8/10/21: 114 lb (51.7 kg).      Observations: well nourished female    Inspection: irregular scapulohumeral rhythm left shoulder      Joint/Motion:    Neck:  Neck AROM is WNL and non-provocative     Right Shoulder:  AROM: 130° Forward elevation,  60° ER,  IR to L4  PROM: 140° Forward elevation,  65° ER,  L5° IR    Left Shoulder:  AROM: 130° Forward elevation,  75° ER,  IR to 75  PROM: 135° Forward elevation,  80° ER , 80° IR    Strength:  Right Shoulder: Flexion 3-/5,  Abduction 3-/5, ER 3+/5, IR 3+/5      Left Shoulder: Flexion 2-/5,  Abduction 2-/5, ER 2-/5, IR 2-/5     Palpation: Tender to palpation anterior shoulder, long head of biceps tendon. Special Tests/Functional Screens:    [] Wilder-Venancio []+ / [] -  [] Alexander's []+ / [] -   []  Jesus's []+ / [] -    []GH drawer []+ / [] -    [] Bicep Load []+ / [] -   [] Crank []+ / [] -  [] [place] []+ / [] -   [] Elbow Varus []+ / [] -  [] Neer's []+ / [] -      [] Speed's []+ / [] -   []Sulcus Sign []+ / [] -   [] Apprehension []+ / [] -   [] Bicep Load II []+ / [] -   [] Chuck Asters []+ / [] -   [] Elbow Valgus []+ / [] -     [] Other: []+ / [] -         ASSESSMENT     Pt to benefit from skilled PT services in order to improve ROM, strength, functional mobility, endurance, and decrease pain in L shoulder. Pt is globally stiff and weak with all L arm movements. Pt to receive stretches and strengthening exercises to address deficits within surgical protocol in order to normalize ROM, improve functional mobility and strength, and decrease pain.      Outcome Measure:   QuickDASH (Disorders of the Arm, Shoulder, and Hand) 41% disability    Problems:   Pain 8/10 intermittent,    ROM decreased as noted above   Strength decreased as noted above   Limitations with ADLs as noted above, use of left upper extremity, bending, reaching, lifting, carrying, driving      [x] There are no barriers affecting plan of care or recovery    [] Barriers to this patient's plan of care or recovery include:    Domestic Concerns:  [x] No  [] Yes:    Short Term goals (5-6 weeks)   Pain 3/10   ROM WFL   Strength 3+/5    Able to perform / complete the following functions / tasks: ADLs, hobbies, return to exercise regimen  with less pain.  QuickDASH (Disorders of the Arm, Shoulder, and Hand) 30% disability    Long Term goals (10-12 weeks)   Pain 0-3/10   ROM WNL   Strength 4/5     Able to perform / complete the following functions / tasks: ADLs, hobbies, yard work, reach / lift / carry light weighted items in performance of home or work demands, return to exercise regimen with no/minimal pain.  QuickDASH 0-20% disability   Independent with Home Exercise Programs    Rehab Potential: [x] Good  [] Fair  [] Poor    PLAN       Treatment Plan:   therapeutic exercise   therapeutic activity   neuromuscular re-education   manual therapy   electrical stimulation     The following CPT codes are likely to be used in the care of this patient:   49180 PT Evaluation: Moderate Complexity   31530 PT Re-Evaluation   51255 Therapeutic Exercise   03706 Neuromuscular Re-Education   24592 Therapeutic Activities   06838 Manual Therapy    Electrical Stimulation    Suggested Professional Referral: [x] No  [] Yes:     Patient Education:  [x] Plans / Goals, Risks / Benefits discussed  [x] Home exercise program  Method of Education: [x] Verbal  [x] Demo  [x] Written  Comprehension of Education:  [x] Verbalizes understanding. [x] Demonstrates understanding. [] Needs Review. [] Demonstrates / verbalizes understanding of HEP / Dirk Wayne previously given. Frequency:  1-2 days per week for 10-12 weeks    Patient understands diagnosis/prognosis and consents to treatment, plan and goals: [x] Yes    [] No     Thank you for the opportunity to work with your patient. If you have questions or comments, please contact me at [phone]; fax: [phone]. Electronically signed by: Shelly Kitchen, PT    Medicare Patients Only     Please sign Physician's Certification and return to:   [address] [place], [place] [address]d PHYSICAL THERAPY  [address] [place] [address]  Dept: [phone]  Dept Fax: [phone] Certification / Comments     Frequency/Duration 1-2 days per week for 10-12 weeks. Certification period from 9/13/2021  to 12/12/2021. I have reviewed the Plan of Care established for skilled therapy services and certify that the services are required and that they will be provided while the patient is under my care.     Physician's Comments/Revisions:               Physician's Printed Name:                                           [de-identified] Signature:                                                               Date:

## 2021-09-13 NOTE — PROGRESS NOTES
Physical Therapy Daily Treatment Note    Date: 2021  Patient Name: Dmitriy Urrutia  : 1960   MRN: [id number]  DOInjury: 1 year  DOSx: 8/10/21            1. Left shoulder arthroscopic long head biceps tenodesis     2. Left shoulder arthroscopic Subacromial decompression     3.  Left shoulder arthroscopic distal clavicle resection     4.  Left shoulder extensive debridement   Referring Provider: Hanane Cervantes, [address] Diagnosis:      Diagnosis Orders   1. Rupture of left biceps tendon, initial encounter         Outcome Measure: Quick Dash: 41% Impairment    S: See eval  O: Pt given written HEP  Time 920-1000     Visit 1 Repeat outcome measure at mid point and end.     Pain 7/10     ROM See eval     Modalities            Manual 10 mins flex, scap, abd, IR, ER NEXT                Stretch      Table slides HEP NEXT    Wall Walk Flex      Wall Walk ABD      Wall Pec/ER Stretch      Wall ER Stretch      Towel IR Stretch      Cross Body Adduction      Supine Stretch with Arms Behind Head            Exercise      UBE          Pulleys - Flex NEXT     Pulleys - IR NEXT     Supine Wand Flex      Supine Wand Bench Press      Supine Wand ER/IR      Standing Wand IR      Standing Wand Scaption      Standing Wand Flex      Standing Wand Shoulder Press      Standing Wand ER/IR      Shrugs AROM       Pendulum Ex HEP           Supine Flex      S-lying ABD      S-lying ER            Prone Flexion      Prone Ext      Prone Row with ER      Prone Horizontal ABD            Standing Flex      Standing ER      Standing IR      Standing Scaption       Standing ABD      Standing Shoulder Press       Functional activities To aid in ROM and strength needed for reaching , lifting ,pushing and pulling at home/work    ROWS: H       ROWS: M  \"    ROWS: L  \"    ER  \"    IR  \"    Punches      Shoulder Press      Shoulder Flex      Shoulder ABD             Endurance     Shoulder Flexion with Dmitri Baldwin on Wall      Shoulder Flexion with Nuts and Bolts            Weighted Machines      Lat Pull Down      Vertical Row      A:  Tolerated well. Above added to written HEP.   P: Continue with rehab plan  Christa Merino PT    Treatment Charges: Mins Units   Initial Evaluation 30 1   Re-Evaluation     Ther Exercise         TE     Manual Therapy     MT 10 1   Ther Activities        TA     Gait Training          GT     Neuro Re-education NR     Modalities     Non-Billable Service Time     Other     Total Time/Units 40 2

## 2021-09-14 ENCOUNTER — TELEPHONE (OUTPATIENT)
Dept: ORTHOPEDIC SURGERY | Age: 61
End: 2021-09-14

## 2021-09-14 DIAGNOSIS — S46.212A RUPTURE OF LEFT BICEPS TENDON, INITIAL ENCOUNTER: Primary | ICD-10-CM

## 2021-09-14 DIAGNOSIS — M75.102 NONTRAUMATIC TEAR OF LEFT ROTATOR CUFF, UNSPECIFIED TEAR EXTENT: ICD-10-CM

## 2021-09-14 RX ORDER — OXYCODONE HYDROCHLORIDE AND ACETAMINOPHEN 5; 325 MG/1; MG/1
1 TABLET ORAL EVERY 6 HOURS PRN
Qty: 28 TABLET | Refills: 0 | Status: SHIPPED | OUTPATIENT
Start: 2021-09-14 | End: 2021-09-21

## 2021-09-14 NOTE — TELEPHONE ENCOUNTER
.  Last appointment 8/25/2021  Next appointment   Future Appointments   Date Time Provider Kimberly Soto   9/15/2021  8:40 AM Janeth Heller PT [facility] PT [facility]   9/20/2021  1:40 PM Janeth Heller PT [facility] PT [facility]   9/22/2021  1:40 PM Lea Meza PTA [facility] PT [facility]   9/24/2021 11:00 AM DO Nicolas Chi [facility]      Last refill:  08/25/2021  DOS: 08/10/2021      Patient called in requesting refill of:    oxyCODONE-acetaminophen (PERCOCET) 5-325 MG per tablet       [place] [address] [id number], [address] [id number]       Patient was informed of the potential for addiction of long term use of narcotic medication for pain control. I encouraged the patient to gradually lessen the frequency of the dosage due to the long term addictive effects.

## 2021-09-15 ENCOUNTER — TREATMENT (OUTPATIENT)
Dept: PHYSICAL THERAPY | Age: 61
End: 2021-09-15
Payer: MEDICARE

## 2021-09-15 DIAGNOSIS — S46.212A RUPTURE OF LEFT BICEPS TENDON, INITIAL ENCOUNTER: Primary | ICD-10-CM

## 2021-09-15 PROCEDURE — 97530 THERAPEUTIC ACTIVITIES: CPT | Performed by: PHYSICAL THERAPIST

## 2021-09-15 NOTE — PROGRESS NOTES
Physical Therapy Daily Treatment Note    Date: 9/15/2021  Patient Name: Frances Ba   \"Ana Maria Hart\"   : 1960   MRN: [id number]  DOInjury: 1 year  DOSx: 8/10/21            1. Left shoulder arthroscopic long head biceps tenodesis     2. Left shoulder arthroscopic Subacromial decompression     3.  Left shoulder arthroscopic distal clavicle resection     4.  Left shoulder extensive debridement   Referring Provider: No referring provider defined for this encounter. Medical Diagnosis:      Diagnosis Orders   1. Rupture of left biceps tendon, initial encounter         Outcome Measure: Quick Dash: 41% Impairment    S: Pt stated that she struggled with the HEP and needed reinforcement and instruction to perform correctly. O: Pt given written HEP  Time 920-1000     Visit 1 Repeat outcome measure at mid point and end.     Pain 4-6/10     ROM See eval     Modalities            Manual  NEXT                Stretch      Table slides 10 reps x 1 set with 20s holds NEXT    Wall Walk Flex      Wall Walk ABD      Wall Pec/ER Stretch      Wall ER Stretch      Towel IR Stretch      Cross Body Adduction      Supine Stretch with Arms Behind Head            Exercise      UBE          Pulleys - Flex 3 mins     Pulleys - IR 3 mins     Supine Wand Flex NEXT     Supine Wand Bench Press NEXT     Supine Wand ER/IR NEXT     Standing Wand IR NEXT     Standing Wand Scaption NEXT     Standing Wand Flex      Standing Wand Shoulder Press      Standing Wand ER/IR      Shrugs AROM       Pendulum Ex HEP           Supine Flex      S-lying ABD      S-lying ER            Prone Flexion      Prone Ext      Prone Row with ER      Prone Horizontal ABD            Standing Flex      Standing ER      Standing IR      Standing Scaption       Standing ABD      Standing Shoulder Press       Functional activities To aid in ROM and strength needed for reaching , lifting ,pushing and pulling at home/work    ROWS: H       ROWS: M  \"    ROWS: L  \"    ER  \" IR  \"    Punches      Shoulder Press      Shoulder Flex      Shoulder ABD             Endurance     Shoulder Flexion with Ball on Wall      Shoulder Flexion with Nuts and Bolts            Weighted Machines      Lat Pull Down      Vertical Row      A:  Tolerated well with moderate pain throughout session. Pt required VC for technique for each exercises and was able to return demonstration 50% of the time.    P: Continue with rehab plan  Rubi Batista PT    Treatment Charges: Mins Units   Initial Evaluation     Re-Evaluation     Ther Exercise         TE     Manual Therapy     MT     Ther Activities        TA 40 3   Gait Training          GT     Neuro Re-education NR     Modalities     Non-Billable Service Time     Other     Total Time/Units 40 3

## 2021-09-20 ENCOUNTER — TREATMENT (OUTPATIENT)
Dept: PHYSICAL THERAPY | Age: 61
End: 2021-09-20
Payer: MEDICARE

## 2021-09-20 DIAGNOSIS — S46.212A RUPTURE OF LEFT BICEPS TENDON, INITIAL ENCOUNTER: Primary | ICD-10-CM

## 2021-09-20 PROCEDURE — 97110 THERAPEUTIC EXERCISES: CPT

## 2021-09-20 NOTE — PROGRESS NOTES
Physical Therapy Daily Treatment Note    Date: 2021  Patient Name: Willy Brittle   \"Ana Maria Hart\"   : 1960   MRN: [id number]  DOInjury: 1 year  DOSx: 8/10/21            1. Left shoulder arthroscopic long head biceps tenodesis     2. Left shoulder arthroscopic Subacromial decompression     3.  Left shoulder arthroscopic distal clavicle resection     4.  Left shoulder extensive debridement   Referring Provider: No referring provider defined for this encounter. Medical Diagnosis:      Diagnosis Orders   1. Rupture of left biceps tendon, initial encounter         Outcome Measure: Quick Dash: 41% Impairment    S: Pt reports 6/10 pain today. States her shoulder has been really sore since starting HEP. O: Pt given written HEP  Time 5723-0037     Visit 3 Repeat outcome measure at mid point and end.     Pain 6/10 at start  4/10 following PT     ROM See eval     Modalities            Manual  NEXT                Stretch      Table slides  NEXT    Wall Walk Flex      Wall Walk ABD      Wall Pec/ER Stretch      Wall ER Stretch      Towel IR Stretch      Cross Body Adduction      Supine Stretch with Arms Behind Head            Exercise      UBE          Pulleys - Flex 3 mins     Pulleys - IR      Supine Wand Flex 2 x 15     Supine Wand Bench Press 2 x 15     Supine Wand ER/IR 2 x 15 Needs VC for technique throughout    Standing Wand IR 2 x 15     Standing Wand Scaption 2 x 15     Standing Wand Flex      Standing Wand Shoulder Press      Standing Wand ER/IR      Shrugs AROM       Pendulum Ex HEP           Supine Flex      S-lying ABD      S-lying ER            Prone Flexion      Prone Ext      Prone Row with ER      Prone Horizontal ABD            Standing Flex      Standing ER      Standing IR      Standing Scaption       Standing ABD      Standing Shoulder Press       Functional activities To aid in ROM and strength needed for reaching , lifting ,pushing and pulling at home/work    ROWS: H       ROWS: M  \" ROWS: L  \"    ER  \"    IR  \"    Punches      Shoulder Press      Shoulder Flex      Shoulder ABD             Endurance     Shoulder Flexion with Ball on Wall      Shoulder Flexion with Nuts and Bolts            Weighted Machines      Lat Pull Down      Vertical Row      A:  Tolerated well with report of decreased pain to 4/10 following PT. Pt required VC for technique throughout PT. Will require remediation.   P: Continue with rehab plan  [employer], PTA    Treatment Charges: Mins Units   Initial Evaluation     Re-Evaluation     Ther Exercise         TE     Manual Therapy     MT     Ther Activities        TA 40 3   Gait Training          GT     Neuro Re-education NR     Modalities     Non-Billable Service Time     Other     Total Time/Units 40 3

## 2021-09-22 ENCOUNTER — TREATMENT (OUTPATIENT)
Dept: PHYSICAL THERAPY | Age: 61
End: 2021-09-22
Payer: MEDICARE

## 2021-09-22 DIAGNOSIS — S46.212A RUPTURE OF LEFT BICEPS TENDON, INITIAL ENCOUNTER: Primary | ICD-10-CM

## 2021-09-22 PROCEDURE — 97110 THERAPEUTIC EXERCISES: CPT

## 2021-09-22 NOTE — PROGRESS NOTES
Physical Therapy Daily Treatment Note    Date: 2021  Patient Name: Kirk Norris   \"Ana Maria Hart\"   : 1960   MRN: [id number]  DOInjury: 1 year  DOSx: 8/10/21            1. Left shoulder arthroscopic long head biceps tenodesis     2. Left shoulder arthroscopic Subacromial decompression     3.  Left shoulder arthroscopic distal clavicle resection     4.  Left shoulder extensive debridement   Referring Provider:  Keturah Roberts DO    Medical Diagnosis:      Diagnosis Orders   1. Rupture of left biceps tendon, initial encounter         Outcome Measure: Quick Dash: 41% Impairment    S: Pt reports increased pain. Only doing 8 weeks of therapy due to all the problems. Right arm is worse then left. O: Pt given written HEP  Time 4031-5757     Visit 4 Repeat outcome measure at mid point and end. Pain 7/10 at start  4/10 following PT     ROM See eval     Modalities            Manual  NEXT                Stretch      Table slides  NEXT    Wall Walk Flex      Wall Walk ABD      Wall Pec/ER Stretch      Wall ER Stretch      Towel IR Stretch      Cross Body Adduction      Supine Stretch with Arms Behind Head            Exercise      UBE          Pulleys - Flex 3 mins     Pulleys - IR      Supine Wand Flex 2 x 15   HOLD     Supine Wand Bench Press 2 x 15   HOLD     Supine Wand ER/IR 2 x 15   HOLD Needs VC for technique throughout    Standing Wand IR 2 x 15     Standing Wand Scaption 2 x 15     Standing Wand Flex      Standing Wand Shoulder Press      Standing Wand ER/IR      Shrugs AROM       Pendulum Ex HEP           Supine Flex      S-lying ABD      S-lying ER            Rows      Left arm only                             High Red 3 x 10? NEXT                          Mid Red 3 x 10? NEXT                          IR/ER  Yellow/red 3 x 10?  NEXT                Standing Flex      Standing ER      Standing IR      Standing Scaption       Standing ABD      Standing Shoulder Press            ROWS: H       ROWS: M  \" ROWS: L  \"    ER  \"    IR  \"    Punches      Shoulder Press      Shoulder Flex      Shoulder ABD                  Shoulder Flexion with Ball on Wall      Shoulder Flexion with Nuts and Bolts            Weighted Machines      Lat Pull Down      Vertical Row      A:  Tolerated fair. Reviewed case with PT and will make changes due to pain on right arm. P: Continue with rehab plan. Add rows with left arm only. With limited range.   Lars Freeman PTA    Treatment Charges: Mins Units   Initial Evaluation     Re-Evaluation     Ther Exercise         TE 40 3   Manual Therapy     MT     Ther Activities        TA     Gait Training          GT     Neuro Re-education NR     Modalities     Non-Billable Service Time     Other     Total Time/Units 40 3 regular

## 2021-09-24 ENCOUNTER — OFFICE VISIT (OUTPATIENT)
Dept: ORTHOPEDIC SURGERY | Age: 61
End: 2021-09-24

## 2021-09-24 VITALS — TEMPERATURE: 98 F | BODY MASS INDEX: 19.46 KG/M2 | HEIGHT: 64 IN | WEIGHT: 114 LBS

## 2021-09-24 DIAGNOSIS — S46.212A RUPTURE OF LEFT BICEPS TENDON, INITIAL ENCOUNTER: Primary | ICD-10-CM

## 2021-09-24 DIAGNOSIS — M19.012 LOCALIZED OSTEOARTHRITIS OF LEFT SHOULDER: ICD-10-CM

## 2021-09-24 DIAGNOSIS — M75.102 NONTRAUMATIC TEAR OF LEFT ROTATOR CUFF, UNSPECIFIED TEAR EXTENT: ICD-10-CM

## 2021-09-24 DIAGNOSIS — Z98.890 STATUS POST ARTHROSCOPY OF SHOULDER: ICD-10-CM

## 2021-09-24 PROCEDURE — 99024 POSTOP FOLLOW-UP VISIT: CPT | Performed by: ORTHOPAEDIC SURGERY

## 2021-09-27 ENCOUNTER — TREATMENT (OUTPATIENT)
Dept: PHYSICAL THERAPY | Age: 61
End: 2021-09-27
Payer: MEDICARE

## 2021-09-27 DIAGNOSIS — S46.212A RUPTURE OF LEFT BICEPS TENDON, INITIAL ENCOUNTER: Primary | ICD-10-CM

## 2021-09-27 PROCEDURE — 97110 THERAPEUTIC EXERCISES: CPT

## 2021-09-27 NOTE — PROGRESS NOTES
Punches      Shoulder Press      Shoulder Flex      Shoulder ABD            Weighted Machines      Lat Pull Down      Vertical Row      A:  Tolerated well. Pt able to perform exercises better without use of right UE. Fatigues with exercise  P: Continue with rehab plan.     Francisca Huffman PTA    Treatment Charges: Mins Units   Initial Evaluation     Re-Evaluation     Ther Exercise         TE 35 2   Manual Therapy     MT     Ther Activities        TA     Gait Training          GT     Neuro Re-education NR     Modalities     Non-Billable Service Time     Other     Total Time/Units 35 2

## 2021-09-29 ENCOUNTER — TREATMENT (OUTPATIENT)
Dept: PHYSICAL THERAPY | Age: 61
End: 2021-09-29
Payer: MEDICARE

## 2021-09-29 DIAGNOSIS — S46.212A RUPTURE OF LEFT BICEPS TENDON, INITIAL ENCOUNTER: Primary | ICD-10-CM

## 2021-09-29 PROCEDURE — 97110 THERAPEUTIC EXERCISES: CPT

## 2021-09-29 NOTE — PROGRESS NOTES
Physical Therapy Daily Treatment Note    Date: 2021  Patient Name: Stanislaw Koroma   \"Ana Maria Hart\"   : 1960   MRN: [id number]  DOInjury: 1 year  DOSx: 8/10/21            1. Left shoulder arthroscopic long head biceps tenodesis     2. Left shoulder arthroscopic Subacromial decompression     3.  Left shoulder arthroscopic distal clavicle resection     4.  Left shoulder extensive debridement   Referring Provider:  Jc Bravo DO    Medical Diagnosis:      Diagnosis Orders   1. Rupture of left biceps tendon, initial encounter         Outcome Measure: Quick Dash: 41% Impairment    S: Pt reports 2/10 pain left shoulder. States right arm actually hurts worse than left. O: Pt given written HEP  Time 9881-8382     Visit  Repeat outcome measure at mid point and end.     Pain 2/10     ROM AROM: 130° Forward elevation,  75° ER,  IR to 75  PROM: 135° Forward elevation,  80° ER , 80° IR     Modalities            Manual  NEXT                Stretch      Table slides  NEXT    Wall Walk Flex      Wall Walk ABD      Wall Pec/ER Stretch      Wall ER Stretch      Towel IR Stretch      Cross Body Adduction      Supine Stretch with Arms Behind Head            Exercise      UBE          Pulleys - Flex 3 mins     Pulleys - IR      Supine Wand Flex      Supine Wand Bench Press 2 x 15        Supine Wand ER/IR  Needs VC for technique throughout    Standing Wand IR 2 x 15     Standing Wand Scaption 2 x 15     Standing Wand Flex      Standing Wand Shoulder Press      Standing Wand ER/IR      Shrugs AROM       Pendulum Ex HEP           Supine Flex 2 x 15     S-lying ABD      S-lying ER 2 x 15 With towel under arm          Rows      Left arm only                             High Red 2 x 15 Increase to green                          Mid Red 2 x 15 Increase to green                          IR  Red 2 x 15 ea with towel No ER painful                Standing Flex      Standing ER      Standing IR      Standing Scaption       Standing ABD      Standing Shoulder Press            ROWS: H       ROWS: M  \"    ROWS: L  \"    ER  \"    IR  \"    Punches      Shoulder Press      Shoulder Flex      Shoulder ABD            Weighted Machines      Lat Pull Down      Vertical Row      A:  Tolerated well. Modified exercises to left side only due to increased pain in right shoulder  P: Continue with rehab plan.     Austyn Collazopoana [facility]    Treatment Charges: Mins Units   Initial Evaluation     Re-Evaluation     Ther Exercise         TE 35 2   Manual Therapy     MT     Ther Activities        TA     Gait Training          GT     Neuro Re-education NR     Modalities     Non-Billable Service Time     Other     Total Time/Units 35 2

## 2021-10-04 ENCOUNTER — TELEPHONE (OUTPATIENT)
Dept: ORTHOPEDIC SURGERY | Age: 61
End: 2021-10-04

## 2021-10-04 DIAGNOSIS — M75.102 NONTRAUMATIC TEAR OF LEFT ROTATOR CUFF, UNSPECIFIED TEAR EXTENT: ICD-10-CM

## 2021-10-04 DIAGNOSIS — S46.212A RUPTURE OF LEFT BICEPS TENDON, INITIAL ENCOUNTER: Primary | ICD-10-CM

## 2021-10-04 RX ORDER — HYDROCODONE BITARTRATE AND ACETAMINOPHEN 5; 325 MG/1; MG/1
1 TABLET ORAL EVERY 8 HOURS PRN
Qty: 21 TABLET | Refills: 0 | Status: SHIPPED | OUTPATIENT
Start: 2021-10-04 | End: 2021-10-11

## 2021-10-04 NOTE — TELEPHONE ENCOUNTER
.  Last appointment 9/24/2021  Next appointment   Future Appointments   Date Time Provider Kimberly Soto   10/6/2021 10:00 AM Dianelys Chavez PTA [facility]   10/8/2021 10:00 AM Allyson Carroll [facility]   11/19/2021 10:30 AM Claudetta Goodie, DO Leitha Emerald [facility]      Last refill:  09/14/2021  DOS: 08/10/2021      Patient called in requesting refill of:    oxyCODONE-acetaminophen (PERCOCET) 5-325 MG per tablet       [place] [address] [address][id number] Lisseth Landry, [address] [id number]         Patient was informed of the potential for addiction of long term use of narcotic medication for pain control. I encouraged the patient to gradually lessen the frequency of the dosage due to the long term addictive effects.

## 2021-10-06 ENCOUNTER — TELEPHONE (OUTPATIENT)
Dept: PHYSICAL THERAPY | Age: 61
End: 2021-10-06

## 2021-10-13 ENCOUNTER — TREATMENT (OUTPATIENT)
Dept: PHYSICAL THERAPY | Age: 61
End: 2021-10-13
Payer: MEDICARE

## 2021-10-13 DIAGNOSIS — S46.212A RUPTURE OF LEFT BICEPS TENDON, INITIAL ENCOUNTER: Primary | ICD-10-CM

## 2021-10-13 PROCEDURE — 97110 THERAPEUTIC EXERCISES: CPT

## 2021-10-13 NOTE — PROGRESS NOTES
Physical Therapy Daily Treatment Note    Date: 10/13/2021  Patient Name: Micael Simmonds   [facility]\"   : 1960   MRN: [id number]  DOInjury: 1 year  DOSx: 8/10/21            1. Left shoulder arthroscopic long head biceps tenodesis     2. Left shoulder arthroscopic Subacromial decompression     3.  Left shoulder arthroscopic distal clavicle resection     4.  Left shoulder extensive debridement   Referring Provider:  Vic Fenton DO    Medical Diagnosis:      Diagnosis Orders   1. Rupture of left biceps tendon, initial encounter         Outcome Measure: Quick Dash: 41% Impairment    S: Pt reports shoulder is doing pretty good. States right arm hurts more than left  O: Pt given written HEP  Time [id number]     Visit  Repeat outcome measure at mid point and end.     Pain 2/10     ROM AROM: 170° Forward elevation,  90° ER,  IR to T7   10/13/21    Modalities            Manual  NEXT                Stretch      Table slides  NEXT    Wall Walk Flex      Wall Walk ABD      Wall Pec/ER Stretch      Wall ER Stretch      Towel IR Stretch      Cross Body Adduction      Supine Stretch with Arms Behind Head            Exercise      UBE          Pulleys - Flex 3 mins     Pulleys - IR      Supine Wand Flex      Supine Wand Bench Press 2 x 15        Supine Wand ER/IR  Needs VC for technique throughout    Standing Wand IR     Standing Wand Scaption     Standing Wand Flex      Standing Wand Shoulder Press      Standing Wand ER/IR      Shrugs AROM       Pendulum Ex HEP           Supine Flex 1# 2 x 15     S-lying ABD      S-lying ER 1# 2 x 15 With towel under arm          Standing Flex      Standing ER      Standing IR      Standing Scaption       Standing ABD      Standing Shoulder Press            ROWS: H  Green 2 x 15      ROWS: M Green 2 x 15 \"    ROWS: L  \"    ER  \"    IR Green 2 x 15 With towel    Punches      Shoulder Press      Shoulder Flex      Shoulder ABD            Weighted Machines      Lat Pull Down      Vertical Row      A:  Tolerated well. ROM improving. Modified exercises to left side only due to increased pain in right shoulder  P: Continue with rehab plan.     Leontine Began, PTA    Treatment Charges: Mins Units   Initial Evaluation     Re-Evaluation     Ther Exercise         TE 40 3   Manual Therapy     MT     Ther Activities        TA     Gait Training          GT     Neuro Re-education NR     Modalities     Non-Billable Service Time     Other     Total Time/Units 40 3

## 2021-10-19 ENCOUNTER — TREATMENT (OUTPATIENT)
Dept: PHYSICAL THERAPY | Age: 61
End: 2021-10-19
Payer: MEDICARE

## 2021-10-19 DIAGNOSIS — S46.212A RUPTURE OF LEFT BICEPS TENDON, INITIAL ENCOUNTER: Primary | ICD-10-CM

## 2021-10-19 PROCEDURE — 97110 THERAPEUTIC EXERCISES: CPT

## 2021-10-22 ENCOUNTER — TELEPHONE (OUTPATIENT)
Dept: PHYSICAL THERAPY | Age: 61
End: 2021-10-22

## 2021-10-29 ENCOUNTER — TELEPHONE (OUTPATIENT)
Dept: ADMINISTRATIVE | Age: 61
End: 2021-10-29

## 2021-11-19 ENCOUNTER — OFFICE VISIT (OUTPATIENT)
Dept: ORTHOPEDIC SURGERY | Age: 61
End: 2021-11-19
Payer: MEDICARE

## 2021-11-19 VITALS — HEIGHT: 64 IN | WEIGHT: 114 LBS | TEMPERATURE: 98 F | BODY MASS INDEX: 19.46 KG/M2

## 2021-11-19 DIAGNOSIS — M75.102 NONTRAUMATIC TEAR OF LEFT ROTATOR CUFF, UNSPECIFIED TEAR EXTENT: ICD-10-CM

## 2021-11-19 DIAGNOSIS — M19.012 LOCALIZED OSTEOARTHRITIS OF LEFT SHOULDER: ICD-10-CM

## 2021-11-19 DIAGNOSIS — S46.212A RUPTURE OF LEFT BICEPS TENDON, INITIAL ENCOUNTER: Primary | ICD-10-CM

## 2021-11-19 PROCEDURE — G8427 DOCREV CUR MEDS BY ELIG CLIN: HCPCS | Performed by: ORTHOPAEDIC SURGERY

## 2021-11-19 PROCEDURE — 3017F COLORECTAL CA SCREEN DOC REV: CPT | Performed by: ORTHOPAEDIC SURGERY

## 2021-11-19 PROCEDURE — 4004F PT TOBACCO SCREEN RCVD TLK: CPT | Performed by: ORTHOPAEDIC SURGERY

## 2021-11-19 PROCEDURE — 99213 OFFICE O/P EST LOW 20 MIN: CPT | Performed by: ORTHOPAEDIC SURGERY

## 2021-11-19 PROCEDURE — G8420 CALC BMI NORM PARAMETERS: HCPCS | Performed by: ORTHOPAEDIC SURGERY

## 2021-11-19 PROCEDURE — G8484 FLU IMMUNIZE NO ADMIN: HCPCS | Performed by: ORTHOPAEDIC SURGERY

## 2021-11-19 RX ORDER — HYDROCODONE BITARTRATE AND ACETAMINOPHEN 5; 325 MG/1; MG/1
1 TABLET ORAL EVERY 12 HOURS PRN
Qty: 14 TABLET | Refills: 0 | Status: SHIPPED | OUTPATIENT
Start: 2021-11-19 | End: 2021-11-26

## 2021-11-19 NOTE — PROGRESS NOTES
Chief Complaint   Patient presents with    Shoulder Pain     Left shoulder DOS 8/10/21. Shoulder was doing well but over the last couple of weeks it has been a little painful again. Still doing exercies with bands she got from PT. Would like something for pain if possible. HPI:    Patient is 64 y.o. femalepresents today for 3-month follow-up after Left shoulder arthroscopy. Patient reports Left shoulder DOS 8/10/21. Shoulder was doing well but over the last couple of weeks it has been a little painful again. Still doing exercies with bands she got from PT. Would like something for pain if possible. .      ROS:    Skin: (-) rash,(-) psoriasis,(-) eczema, (-)skin cancer. Neurologic: (-)numbness, (-)tingling, (-)headaches, (-) LOC. Cardiovascular: (-) Chest pain, (-) swelling in legs/feet, (-) SOB, (-) cramping in legs/feet with walking.     All other review of systems negative except stated above or in HPI      Past Medical History:   Diagnosis Date    Arthritis     Bipolar 1 disorder ([address]     Chronic fatigue     COPD (chronic obstructive pulmonary disease) (HCC)     Depression     Fibromyalgia     GERD (gastroesophageal reflux disease)     Migraines     Neuropathy     Short-term memory loss      Past Surgical History:   Procedure Laterality Date    CARDIOVASCULAR STRESS TEST  02/12/2021    Lexiscan stress test    CHOLECYSTECTOMY      COLONOSCOPY      ENDOSCOPY, COLON, DIAGNOSTIC      ESOPHAGEAL MOTILITY STUDY N/A 5/12/2021    ESOPHAGEAL MOTILITY/MANOMETRY STUDY performed by Eagle Ignacio DO at [facility] ARTHROSCOPY Left 8/10/2021    LEFT SHOULDER ARTHROSCOPY, SUBACROMIAL DECOMPRESSION, DEBRIDEMENT ([address]) performed by Vic Fenton DO at [facility], [address]         Current Outpatient Medications:     docusate sodium (COLACE) 100 MG capsule, Take 1 capsule by mouth 2 times daily as needed for Constipation, Disp: 20 capsule, Rfl: 1   ketorolac (TORADOL) 10 MG tablet, Take 1 tablet by mouth every 6 hours as needed for Pain Patient received prior injection, Disp: 20 tablet, Rfl: 0    ondansetron (ZOFRAN) 4 MG tablet, Take 1 tablet by mouth every 8 hours as needed for Nausea or Vomiting, Disp: 20 tablet, Rfl: 0    cetirizine (ZYRTEC) 10 MG tablet, Take 10 mg by mouth daily, Disp: , Rfl:     pantoprazole (PROTONIX) 40 MG tablet, Take 40 mg by mouth daily, Disp: , Rfl:     SYMBICORT 160-4.5 MCG/ACT AERO, Inhale 2 puffs into the lungs 2 times daily as needed , Disp: , Rfl:     diphenoxylate-atropine (LOMOTIL) 2.5-0.025 MG per tablet, TAKE 1 TO 2 TABLETS BY MOUTH EVERY 8 HOURS AS NEEDED, Disp: , Rfl:     pregabalin (LYRICA) 75 MG capsule, TAKE 1 CAPSULE BY MOUTH FOUR TIMES DAILY, Disp: , Rfl:     risperiDONE (RISPERDAL) 2 MG tablet, TAKE 1 TABLET BY MOUTH AT BEDTIME, Disp: , Rfl:   Allergies   Allergen Reactions    Erythromycin Rash     Social History     Socioeconomic History    Marital status:      Spouse name: Not on file    Number of children: Not on file    Years of education: Not on file    Highest education level: Not on file   Occupational History    Not on file   Tobacco Use    Smoking status: Current Every Day Smoker     Packs/day: 1.50     Years: 44.00     Pack years: 66.00     Types: Cigarettes    Smokeless tobacco: Never Used   Vaping Use    Vaping Use: Never used   Substance and Sexual Activity    Alcohol use: No    Drug use: No    Sexual activity: Not on file   Other Topics Concern    Not on file   Social History Narrative    Not on file     Social Determinants of Health     Financial Resource Strain:     Difficulty of Paying Living Expenses: Not on file   Food Insecurity:     Worried About Running Out of Food in the Last Year: Not on file    [place] of Food in the Last Year: Not on file   Transportation Needs:     Lack of Transportation (Medical): Not on file    Lack of Transportation (Non-Medical):  Not on file   Physical Activity:     Days of Exercise per Week: Not on file    Minutes of Exercise per Session: Not on file   Stress:     Feeling of Stress : Not on file   Social Connections:     Frequency of Communication with Friends and Family: Not on file    Frequency of Social Gatherings with Friends and Family: Not on file    Attends Synagogue Services: Not on file    Active Member of [address] [place] [address] or Organizations: Not on file    Attends Club or Organization Meetings: Not on file    Marital Status: Not on file   Intimate Partner Violence:     Fear of Current or Ex-Partner: Not on file    Emotionally Abused: Not on file    Physically Abused: Not on file    Sexually Abused: Not on file   Housing Stability:     Unable to Pay for Housing in the Last Year: Not on file    Number of [place] in the Last Year: Not on file    Unstable Housing in the Last Year: Not on file     No family history on file. Physical Exam:    Temp 98 °F (36.7 °C)   Ht 5' 3.5\" (1.613 m)   Wt 114 lb (51.7 kg)   BMI 19.88 kg/m²     GENERAL: alert, appears stated age, cooperative, no acute distress    HEENT: Head is normocephalic, atraumatic. PERRLA. SKIN: Clean, dry, intact. There is not any cellulitis or cutaneous lesions noted in the upper extremities    PULMONARY: breathing is regular and unlabored, no acute distress    CV: The bilateral upper and lower extremities are warm and well-perfused with brisk capillary refill. 2+ pulses UE and LE bilateral.     PSYCHIATRY: Pleasant mood, appropriate behavior, follows commands    NEURO: Sensation is intact distally with light touch with no alteration. Motor exam of the upper extremities show elbow flexion and extension, wrist flexion and extension, and finger abduction grossly intact 5/5. Upper extremity reflexes are bilaterally symmetrical and within normal limits. LYMPH: No lymphedema present distally in upper or lower extremity.      MUSCULOSKELETAL:  Examination of the Left shoulder shows: There is not a deformity. There is not erythema. There is not soft tissue swelling. Deltoid region is not tender to palpation. AC Joint is not tender to palpation. Clavicle is not tender to palpation. Bicipital Groove is not tender to palpation. Pectoralis  is not tender to palpation. Scapula/ trapezius is  tender to palpation. Right:  ROM Full, Strength: Supraspinatus 5/5, Infraspinatus 5/5, Subscapularis 5/5  Left:  /170/60, Strength: Supraspinatus 5/5, Infraspinatus 5/5, Subscapularis 5/5  LeftShoulder:  Crepitus:  no   Tenderness:  none   Effusion:   none   Impingement: negative   Empty Can:  negative   Speed's:  negative      Apprehension:  negative   Cross Arm Sign:  negative   Winkler's:  negative       Neer's:  negative      Belly Press Test:  negative      Drop Arm Test:  negative           Imaging:  Surgical pictures and imaging reviewed with patient in detail        Brain Diaz was seen today for shoulder pain. Diagnoses and all orders for this visit:    Rupture of left biceps tendon, initial encounter  -     HYDROcodone-acetaminophen (NORCO) 5-325 MG per tablet; Take 1 tablet by mouth every 12 hours as needed for Pain for up to 7 days. Nontraumatic tear of left rotator cuff, unspecified tear extent  -     HYDROcodone-acetaminophen (NORCO) 5-325 MG per tablet; Take 1 tablet by mouth every 12 hours as needed for Pain for up to 7 days. Localized osteoarthritis of left shoulder  -     HYDROcodone-acetaminophen (NORCO) 5-325 MG per tablet; Take 1 tablet by mouth every 12 hours as needed for Pain for up to 7 days.  -     External Referral To Pain Clinic        Patient seen and examined. X-rays reviewed. Natural history and course discussed with patient. Treatment options discussed with patient in detail including risks and benefits. At this point patient has done well status post left shoulder arthroscopy.   Patient does have a consider amount of existing glenohumeral arthritic change noted. Patient has done well however did not complete physical therapy due to pain of right shoulder. Patient is requesting continued chronic pain medication at this time. Patient was educated that she will be referred to pain management services at this time for treatment of chronic pain issues. Patient verbalized understanding wish to proceed with this treatment option. In a 15 minute assessment and discussion, patient was counseled on continued weight loss, diet, and physical activity relating to this condition. She was educated with options in detail including nutrition, joining a health club/ weight loss program, and use of cardio equipment such as the Arc Trainer and the importance of use as well as range of motion and HEP exercises for weight loss. In this 15 minute conversation during the visit, Patient was informed of the potential for addiction of long term use of narcotic medication for pain control. I encouraged the patient to gradually lessen the frequency of the dosage due to the long term addictive effects. Patient verbalized understanding and states will try to cut back as much as possible.         Eriberto Pedraza, DO

## 2022-02-07 DIAGNOSIS — M19.012 OSTEOARTHRITIS OF GLENOHUMERAL JOINT, LEFT: Primary | ICD-10-CM

## 2022-02-07 PROCEDURE — 99421 OL DIG E/M SVC 5-10 MIN: CPT | Performed by: ORTHOPAEDIC SURGERY

## 2022-02-09 NOTE — PROGRESS NOTES
Patient's chart was reviewed including pertinent information including allergies, current medications, medical conditions, last exam and office notes, available musculoskeletal imaging, most recent labs and other physician and provider notes. In 6-minute chart review, the most appropriate medication/treatment was ordered. In addition, patient did not/ does not have any scheduled office visits within 7 days of this encounter. Patient was educated to monitor for adverse reactions and to notify office of any issues.         Diagnoses and all orders for this visit:    Osteoarthritis of glenohumeral joint, left    Other orders  -     Tigre Butler MD, Pain Medicine, [facility]

## 2022-02-15 ENCOUNTER — OFFICE VISIT (OUTPATIENT)
Dept: ORTHOPEDIC SURGERY | Age: 62
End: 2022-02-15
Payer: MEDICARE

## 2022-02-15 VITALS — WEIGHT: 115 LBS | BODY MASS INDEX: 19.63 KG/M2 | HEIGHT: 64 IN

## 2022-02-15 DIAGNOSIS — S46.212A RUPTURE OF LEFT BICEPS TENDON, INITIAL ENCOUNTER: ICD-10-CM

## 2022-02-15 DIAGNOSIS — M19.012 OSTEOARTHRITIS OF GLENOHUMERAL JOINT, LEFT: Primary | ICD-10-CM

## 2022-02-15 DIAGNOSIS — M75.42 IMPINGEMENT SYNDROME OF LEFT SHOULDER: ICD-10-CM

## 2022-02-15 PROCEDURE — G8484 FLU IMMUNIZE NO ADMIN: HCPCS | Performed by: ORTHOPAEDIC SURGERY

## 2022-02-15 PROCEDURE — 99214 OFFICE O/P EST MOD 30 MIN: CPT | Performed by: ORTHOPAEDIC SURGERY

## 2022-02-15 PROCEDURE — 3017F COLORECTAL CA SCREEN DOC REV: CPT | Performed by: ORTHOPAEDIC SURGERY

## 2022-02-15 PROCEDURE — 4004F PT TOBACCO SCREEN RCVD TLK: CPT | Performed by: ORTHOPAEDIC SURGERY

## 2022-02-15 PROCEDURE — 20610 DRAIN/INJ JOINT/BURSA W/O US: CPT | Performed by: ORTHOPAEDIC SURGERY

## 2022-02-15 PROCEDURE — G8427 DOCREV CUR MEDS BY ELIG CLIN: HCPCS | Performed by: ORTHOPAEDIC SURGERY

## 2022-02-15 PROCEDURE — G8420 CALC BMI NORM PARAMETERS: HCPCS | Performed by: ORTHOPAEDIC SURGERY

## 2022-02-15 RX ORDER — TRIAMCINOLONE ACETONIDE 40 MG/ML
40 INJECTION, SUSPENSION INTRA-ARTICULAR; INTRAMUSCULAR ONCE
Status: COMPLETED | OUTPATIENT
Start: 2022-02-15 | End: 2022-02-15

## 2022-02-15 RX ADMIN — TRIAMCINOLONE ACETONIDE 40 MG: 40 INJECTION, SUSPENSION INTRA-ARTICULAR; INTRAMUSCULAR at 11:45

## 2022-02-15 NOTE — PROGRESS NOTES
Chief Complaint   Patient presents with    Shoulder Pain     Left shoulder follow up. Her pain came back about a month ago. She would like to go back to PT.         HPI:    Patient is 64 y.o. femalepresents today for 6-month follow-up after Left shoulder arthroscopy. Patient reports Left shoulder DOS 8/10/21. Shoulder was doing well but over the last couple of weeks it has been a little painful again. Still doing exercies with bands she got from PT. Previous treatments include rest, ice, heat, NSAIDs, HEP without much relief. ROS:    Skin: (-) rash,(-) psoriasis,(-) eczema, (-)skin cancer. Neurologic: (-)numbness, (-)tingling, (-)headaches, (-) LOC. Cardiovascular: (-) Chest pain, (-) swelling in legs/feet, (-) SOB, (-) cramping in legs/feet with walking.     All other review of systems negative except stated above or in HPI      Past Medical History:   Diagnosis Date    Arthritis     Bipolar 1 disorder ([facility] [address]     Chronic fatigue     COPD (chronic obstructive pulmonary disease) (HCC)     Depression     Fibromyalgia     GERD (gastroesophageal reflux disease)     Migraines     Neuropathy     Short-term memory loss      Past Surgical History:   Procedure Laterality Date    CARDIOVASCULAR STRESS TEST  02/12/2021    Lexiscan stress test    CHOLECYSTECTOMY      COLONOSCOPY      ENDOSCOPY, COLON, DIAGNOSTIC      ESOPHAGEAL MOTILITY STUDY N/A 5/12/2021    ESOPHAGEAL MOTILITY/MANOMETRY STUDY performed by Yu Gardner DO at [facility] ARTHROSCOPY Left 8/10/2021    LEFT SHOULDER ARTHROSCOPY, SUBACROMIAL DECOMPRESSION, DEBRIDEMENT ([address]) performed by Miriam Mercedes DO at [facility], [facility]         Current Outpatient Medications:     docusate sodium (COLACE) 100 MG capsule, Take 1 capsule by mouth 2 times daily as needed for Constipation, Disp: 20 capsule, Rfl: 1    ketorolac (TORADOL) 10 MG tablet, Take 1 tablet by mouth every 6 hours as needed for Pain Patient received prior injection, Disp: 20 tablet, Rfl: 0    ondansetron (ZOFRAN) 4 MG tablet, Take 1 tablet by mouth every 8 hours as needed for Nausea or Vomiting, Disp: 20 tablet, Rfl: 0    cetirizine (ZYRTEC) 10 MG tablet, Take 10 mg by mouth daily, Disp: , Rfl:     pantoprazole (PROTONIX) 40 MG tablet, Take 40 mg by mouth daily, Disp: , Rfl:     SYMBICORT 160-4.5 MCG/ACT AERO, Inhale 2 puffs into the lungs 2 times daily as needed , Disp: , Rfl:     diphenoxylate-atropine (LOMOTIL) 2.5-0.025 MG per tablet, TAKE 1 TO 2 TABLETS BY MOUTH EVERY 8 HOURS AS NEEDED, Disp: , Rfl:     pregabalin (LYRICA) 75 MG capsule, TAKE 1 CAPSULE BY MOUTH FOUR TIMES DAILY, Disp: , Rfl:     risperiDONE (RISPERDAL) 2 MG tablet, TAKE 1 TABLET BY MOUTH AT BEDTIME, Disp: , Rfl:   Allergies   Allergen Reactions    Erythromycin Rash     Social History     Socioeconomic History    Marital status:      Spouse name: Not on file    Number of children: Not on file    Years of education: Not on file    Highest education level: Not on file   Occupational History    Not on file   Tobacco Use    Smoking status: Current Every Day Smoker     Packs/day: 1.50     Years: 44.00     Pack years: 66.00     Types: Cigarettes    Smokeless tobacco: Never Used   Vaping Use    Vaping Use: Never used   Substance and Sexual Activity    Alcohol use: No    Drug use: No    Sexual activity: Not on file   Other Topics Concern    Not on file   Social History Narrative    Not on file     Social Determinants of Health     Financial Resource Strain:     Difficulty of Paying Living Expenses: Not on file   Food Insecurity:     Worried About Running Out of Food in the Last Year: Not on file    [place] of Food in the Last Year: Not on file   Transportation Needs:     Lack of Transportation (Medical): Not on file    Lack of Transportation (Non-Medical):  Not on file   Physical Activity:     Days of Exercise per Week: Not on file    Minutes of Exercise per Session: Not on file   Stress:     Feeling of Stress : Not on file   Social Connections:     Frequency of Communication with Friends and Family: Not on file    Frequency of Social Gatherings with Friends and Family: Not on file    Attends Worship Services: Not on file    Active Member of Clubs or Organizations: Not on file    Attends Club or Organization Meetings: Not on file    Marital Status: Not on file   Intimate Partner Violence:     Fear of Current or Ex-Partner: Not on file    Emotionally Abused: Not on file    Physically Abused: Not on file    Sexually Abused: Not on file   Housing Stability:     Unable to Pay for Housing in the Last Year: Not on file    Number of [place] in the Last Year: Not on file    Unstable Housing in the Last Year: Not on file     No family history on file. Physical Exam:    Ht 5' 3.5\" (1.613 m)   Wt 115 lb (52.2 kg)   BMI 20.05 kg/m²     GENERAL: alert, appears stated age, cooperative, no acute distress    HEENT: Head is normocephalic, atraumatic. PERRLA. SKIN: Clean, dry, intact. There is not any cellulitis or cutaneous lesions noted in the upper extremities    PULMONARY: breathing is regular and unlabored, no acute distress    CV: The bilateral upper and lower extremities are warm and well-perfused with brisk capillary refill. 2+ pulses UE and LE bilateral.     PSYCHIATRY: Pleasant mood, appropriate behavior, follows commands    NEURO: Sensation is intact distally with light touch with no alteration. Motor exam of the upper extremities show elbow flexion and extension, wrist flexion and extension, and finger abduction grossly intact 5/5. Upper extremity reflexes are bilaterally symmetrical and within normal limits. LYMPH: No lymphedema present distally in upper or lower extremity. MUSCULOSKELETAL:  Examination of the Left shoulder shows: There is not a deformity. There is not erythema. There is not soft tissue swelling. Deltoid region is not tender to palpation. AC Joint is not tender to palpation. Clavicle is not tender to palpation. Bicipital Groove is not tender to palpation. Pectoralis  is not tender to palpation. Scapula/ trapezius is  tender to palpation. Right:  ROM Full, Strength: Supraspinatus 5/5, Infraspinatus 5/5, Subscapularis 5/5  Left:  /170/60, Strength: Supraspinatus 5/5, Infraspinatus 5/5, Subscapularis 5/5  LeftShoulder:  Crepitus:  no   Tenderness:  none   Effusion:   none   Impingement: negative   Empty Can:  negative   Speed's:  negative      Apprehension:  negative   Cross Arm Sign:  negative   [place]'s:  negative       Neer's:  negative      Belly Press Test:  negative      Drop Arm Test:  negative         Imaging:  Surgical pictures and imaging reviewed with patient in detail      Vito Barragan was seen today for shoulder pain. Diagnoses and all orders for this visit:    Osteoarthritis of glenohumeral joint, left  -     [facility] - Physical Therapy, [address] ARTHROCENTESIS ASPIR&/INJ MAJOR JT/BURSA W/O US    Rupture of left biceps tendon, initial encounter  -     [facility] - Physical Therapy, [address] ARTHROCENTESIS ASPIR&/INJ MAJOR JT/BURSA W/O US    Impingement syndrome of left shoulder    Other orders  -     triamcinolone acetonide (KENALOG-40) injection 40 mg        Patient seen and examined. X-rays reviewed. Natural history and course discussed with patient. Treatment options discussed with patient in detail including risks and benefits. At this point patient has done well status post left shoulder arthroscopy. Patient does have a consider amount of existing glenohumeral arthritic change noted. Patient has done well however did not complete physical therapy due to pain of right shoulder. Patient is requesting continued chronic pain medication at this time.   Patient was educated that she will be referred to pain management services at this time for treatment of chronic pain issues. Patient verbalized understanding wish to proceed with this treatment option. Procedure Note Cortisone Injection to Shoulder    The left shoulder was identified as the injection site. The risk and benefits of a cortisone injection were explained and the patient consented to the injection. Under sterile conditions, the shoulder subacromial space was injected with a mixture of 40mg of Kenelog and Marcaine without complication. A sterile bandage was applied. In a 15 minute assessment and discussion, patient was counseled on weight loss, healthy diet, and physical activity relating to this condition. She was educated with options in detail including nutrition, joining a health club/ weight loss program, and use of cardio equipment such as the Arc Trainer and the importance of use as well as range of motion and HEP exercises for weight loss and general health. Patient educated about the healing rates with this condition. Patient does smoke and does have secondhand smoke exposure. In this discussion of approximately 5 minutes, patient was counseled about decrease in smoking exposure regards to healing and overall good health. Patient seems reluctant but is willing to listen to the discussion in detail. She states that she will try to cut down as much as possible and states that she will try to quit completely by the next visit. Lyla Live, DO           25 minutes was spent with patient. 50% or greater was spent counseling the patient.

## 2022-03-08 ENCOUNTER — EVALUATION (OUTPATIENT)
Dept: PHYSICAL THERAPY | Age: 62
End: 2022-03-08
Payer: MEDICARE

## 2022-03-08 DIAGNOSIS — M19.012 PRIMARY OSTEOARTHRITIS OF LEFT SHOULDER: ICD-10-CM

## 2022-03-08 DIAGNOSIS — S46.212A RUPTURE OF LEFT BICEPS TENDON, INITIAL ENCOUNTER: Primary | ICD-10-CM

## 2022-03-08 PROCEDURE — 97110 THERAPEUTIC EXERCISES: CPT | Performed by: PHYSICAL THERAPIST

## 2022-03-08 PROCEDURE — 97162 PT EVAL MOD COMPLEX 30 MIN: CPT | Performed by: PHYSICAL THERAPIST

## 2022-03-08 NOTE — PROGRESS NOTES
[facility] OUTPATIENT REHABILITATION  PHYSICAL THERAPY INITIAL EVALUATION         Date:  3/8/2022   Patient: Ramos Horton  : 1960  MRN: [id number]  Referring Provider: Mitra Scott DO   [address]     Medical Diagnosis:   M19.012 (ICD-10-CM) - Osteoarthritis of glenohumeral joint, left   S46.212A (ICD-10-CM) - Rupture of left biceps tendon, initial encounter     Physician Order: Eval and Treat      SUBJECTIVE:     Onset date: 2022    Onset: Insidious      History / Mechanism of Injury: Mavis Adams had a shoulder arthroscopy 2021    Interventions for current problem: cortisone injections, arthroscopy    Chief complaint: pain    Behavior: condition is staying the same    Pain: constant  Current: 4/10     Best: 4/10     Worst:9/10 (occurs with putting on coat). Pain returns to baseline in a few minutes    Symptom Type / Quality: aching, sharp  Location[de-identified] mid-deltoid region        Aggravated by: lifting/carrying/material handling    Relieved by: rest, placing arm in sling position     Imaging results: No results found.     Past Medical History:  Past Medical History:   Diagnosis Date    Arthritis     Bipolar 1 disorder ([facility] [address]     Chronic fatigue     [facility] disease) ([facility])     Depression     Fibromyalgia     GERD (gastroesophageal reflux disease)     Migraines     Neuropathy     Short-term memory loss      Past Surgical History:   Procedure Laterality Date    CARDIOVASCULAR STRESS TEST  2021    Lexiscan stress test    CHOLECYSTECTOMY      COLONOSCOPY      ENDOSCOPY, COLON, DIAGNOSTIC      ESOPHAGEAL MOTILITY STUDY N/A 2021    ESOPHAGEAL MOTILITY/MANOMETRY STUDY performed by Layla Jauregui DO at [address] [address] ARTHROSCOPY Left 8/10/2021    LEFT SHOULDER ARTHROSCOPY, SUBACROMIAL DECOMPRESSION, DEBRIDEMENT (ARTHREX) performed by Mitra Scott DO at [address] [facility] SINUS SURGERY         Medications:   Current Outpatient Medications   Medication Sig Dispense Refill    docusate sodium (COLACE) 100 MG capsule Take 1 capsule by mouth 2 times daily as needed for Constipation 20 capsule 1    ketorolac (TORADOL) 10 MG tablet Take 1 tablet by mouth every 6 hours as needed for Pain Patient received prior injection 20 tablet 0    ondansetron (ZOFRAN) 4 MG tablet Take 1 tablet by mouth every 8 hours as needed for Nausea or Vomiting 20 tablet 0    cetirizine (ZYRTEC) 10 MG tablet Take 10 mg by mouth daily      pantoprazole (PROTONIX) 40 MG tablet Take 40 mg by mouth daily      SYMBICORT 160-4.5 MCG/ACT AERO Inhale 2 puffs into the lungs 2 times daily as needed       diphenoxylate-atropine (LOMOTIL) 2.5-0.025 MG per tablet TAKE 1 TO 2 TABLETS BY MOUTH EVERY 8 HOURS AS NEEDED      pregabalin (LYRICA) 75 MG capsule TAKE 1 CAPSULE BY MOUTH FOUR TIMES DAILY      risperiDONE (RISPERDAL) 2 MG tablet TAKE 1 TABLET BY MOUTH AT BEDTIME       No current facility-administered medications for this visit. Occupation: disability. Exercise regimen: none     Hobbies: working around [employer], DIY projects     Patient Goals: pain relief, return to prior activity, get back to normal     Precautions/Contraindications: none    OBJECTIVE:     Estimated body mass index is 20.05 kg/m² as calculated from the following:    Height as of 2/15/22: 5' 3.5\" (1.613 m). Weight as of 2/15/22: 115 lb (52.2 kg).      Observations: well nourished female    Inspection: normal orthopedic exam      Joint/Motion:    Neck:  Neck AROM is WNL and non-provocative     Right Shoulder:  AROM: 155° Forward elevation,  90° ER,  IR to T8  PROM: 175° Forward elevation,  100° ER,  55° IR    Left Shoulder:  AROM: 150° Forward elevation,  70° ER,  IR to T10 pain limiting all active motions  PROM: 175° Forward elevation,  90° ER , 65° IR pain at all end ranges    Strength:  Right Shoulder: Flexion 5-/5,  Abduction 5-/5, ER 5-/5, IR 5-/5      Left Shoulder: Flexion 5-/5,  Abduction 5-/5, ER 5-/5, IR 5-/5     Palpation: Tender to palpation grossly through out L upper quarter . Special Tests/Functional Screens:   Patient has pain with all tests  [x] Cee [x]+ / [] -  [x] [place]'s [x]+ / [] -   []  Gerberrgrisson's []+ / [] -    []GH drawer []+ / [] -    [] Bicep Load []+ / [] -   [] Crank []+ / [] -  [] Rebecca Hawks []+ / [] -   [] Elbow Varus []+ / [] -  [x] Neer's []+ / [] -      [] Speed's []+ / [] -   []Sulcus Sign []+ / [] -   [] Apprehension []+ / [] -   [] Bicep Load II []+ / [] -   [] Jenet Chris []+ / [] -   [] Elbow Valgus []+ / [] -     [] Other: []+ / [] -         Kiarra Cadet reports significant pain in her L shoulder. She is tender throughout and all AROM is limited by pain. Her PROM is good despite c/o pain through most of the available ROM.  She thinks she has been on Norco since her surgery which if true, she has memory deficits, would indicate she is not pain adaptive    Outcome Measure:   QuickDASH (Disorders of the Arm, Shoulder, and Hand) __% disability    Problems:   Pain 9/10 constant, waxing / waning  ROM decreased  Strength decreased   Limitations with use of left upper extremity      [] There are no barriers affecting plan of care or recovery    [x] Barriers to this patient's plan of care or recovery include: impaired short term memory combined with psychological disorders lead to poorer outcomes    Domestic Concerns:  [x] No  [] Yes:    Short Term goals (2 weeks)   Pain 5/10   AROM symmetrical    Long Term goals (4 weeks)   Pain 2/10   ROM symmetrical w/o pain   Strength 5/5    Able to perform / complete the following functions / tasks: reach / lift / carry medium weighted items in performance of home or work demands   QuickDASH __% disability   Independent with Home Exercise Programs    Rehab Potential: [x] Good  [] Fair  [] Poor    PLAN       Treatment Plan:   instruction in home exercise program therapeutic exercise   therapeutic activity   manual therapy   cold / hot pack  electrical stimulation     The following CPT codes are likely to be used in the care of this patient:   51901 PT Evaluation: Moderate Complexity   77151 Therapeutic Exercise   99796 Neuromuscular Re-Education   44565 Therapeutic Activities   49176 Manual Therapy    Electrical Stimulation    Suggested Professional Referral: [x] No  [] Yes:     Patient Education:  [x] Plans / Goals, Risks / Benefits discussed  [x] Home exercise program  Method of Education: [x] Verbal  [x] Demo  [x] Written  Comprehension of Education:  [x] Verbalizes understanding. [x] Demonstrates understanding. [] Needs Review. [] Demonstrates / verbalizes understanding of HEP / Fransisca Jean Paul previously given. Frequency:  1-2 days per week for 4 weeks    Patient understands diagnosis/prognosis and consents to treatment, plan and goals: [x] Yes    [] No     Thank you for the opportunity to work with your patient. If you have questions or comments, please contact me at [phone]; fax: [phone]. Electronically signed by: Albina Martínez PT    Medicare Patients Only     Please sign Physician's Certification and return to: [address] [place] [address] PHYSICAL THERAPY  [address] [place] [id number]  Dept: [phone]  Dept Fax: [phone]: [phone] Certification / Comments     Frequency/Duration 1-2 days per week for 4 weeks. Certification period from 3/8/2022  to 6/1/2022. I have reviewed the Plan of Care established for skilled therapy services and certify that the services are required and that they will be provided while the patient is under my care.     Physician's Comments/Revisions:               Physician's Printed Name:                                           [de-identified] Signature:                                                               Date:

## 2022-03-08 NOTE — PROGRESS NOTES
Physical Therapy Daily Treatment Note    Date: 3/8/2022  Patient Name: Per Layne  : 1960   MRN: [id number]  DOInjury: -  DOSx: 8/10/2021   Referring Provider: José Luis Dc DO   [address] [facility]:   J07.549 (ICD-10-CM) - Osteoarthritis of glenohumeral joint, left   S46.212A (ICD-10-CM) - Rupture of left biceps tendon, initial encounter         Outcome Measure:   QuickDASH        X = TO BE PERFORMED NEXT VISIT  > = PROGRESS TO THIS    S: See eval  O: Discussed anatomy, physiology, body mechanics, principles of loading, and progressive loading/activity. Reviewed home exercise program extensively; written copy provided. Access Code: [email or web address]  URL: [email or web address]  Date: 2022  Prepared by: Leigh Gorman    Exercises  Supine Shoulder Flexion with Dowel - 2 x daily - 7 x weekly - 2 sets - 15 reps  Supine Shoulder External Rotation with Dowel - 2 x daily - 7 x weekly - 2 sets - 15 reps  Standing Bilateral Shoulder Internal Rotation AAROM with Dowel - 2 x daily - 7 x weekly - 2 sets - 15 reps      Time 2549-3433     Visit 1 Repeat outcome measure at mid point and end.     Pain Pain at rest 6/10     ROM Left Shoulder:  AROM: 150° Forward elevation,  70° ER,  IR to T10 pain limiting all active motions  PROM: 175° Forward elevation,  90° ER , 65° IR pain at all end ranges   Right Shoulder:  AROM: 155° Forward elevation,  90° ER,  IR to T8  PROM: 175° Forward elevation,  100° ER,  55° IR    Modalities      MH and estim x  MO   Manual         MT         Stretch      Table slides   TE   Wall Flexion   TE   Wall ER stretch   TE   Towel IR stretch   TE   IR reaching behind back   TE   Exercise      Shoulder scaption isometrics      Shoulder ER isometrics       Shrugs AROM   TE   Pendulum Ex   TE   Pulleys - flex   TE   Pulleys-IR   TE   Supine wand chest press   TE   Supine wand flex 2 x 15  TE   Supine wand ER/IR 2 x 15  TE   Standing wand behind back IR 2 x 15  TE   Supine flexion   TE   S-lying ER   TE   Standing wand flex   TE   Standing flexion   TE   ROWS: H   TA   ROWS: M   TA   ROWS: L   TA   ER   TE   IR   TE               A:  Tolerated well.     P: Continue with rehab plan  Ivette Cifuentes PT    Treatment Charges: Mins Units   Initial Evaluation 30 1   Re-Evaluation     Ther Exercise         TE 10 1   Manual Therapy     MT     Ther Activities        TA     Gait Training          GT     Neuro Re-education NR     Modalities     Non-Billable Service Time     Other     Total Time/Units 40 1

## 2022-03-15 ENCOUNTER — OFFICE VISIT (OUTPATIENT)
Dept: ORTHOPEDIC SURGERY | Age: 62
End: 2022-03-15
Payer: MEDICARE

## 2022-03-15 ENCOUNTER — NURSE ONLY (OUTPATIENT)
Dept: ORTHOPEDIC SURGERY | Age: 62
End: 2022-03-15
Payer: MEDICARE

## 2022-03-15 VITALS — HEIGHT: 64 IN | WEIGHT: 115 LBS | BODY MASS INDEX: 19.63 KG/M2

## 2022-03-15 DIAGNOSIS — S46.212A RUPTURE OF LEFT BICEPS TENDON, INITIAL ENCOUNTER: Primary | ICD-10-CM

## 2022-03-15 DIAGNOSIS — M19.012 OSTEOARTHRITIS OF GLENOHUMERAL JOINT, LEFT: Primary | ICD-10-CM

## 2022-03-15 PROCEDURE — G8420 CALC BMI NORM PARAMETERS: HCPCS | Performed by: ORTHOPAEDIC SURGERY

## 2022-03-15 PROCEDURE — 3017F COLORECTAL CA SCREEN DOC REV: CPT | Performed by: ORTHOPAEDIC SURGERY

## 2022-03-15 PROCEDURE — 99213 OFFICE O/P EST LOW 20 MIN: CPT | Performed by: ORTHOPAEDIC SURGERY

## 2022-03-15 PROCEDURE — 20611 DRAIN/INJ JOINT/BURSA W/US: CPT | Performed by: NURSE PRACTITIONER

## 2022-03-15 PROCEDURE — G8427 DOCREV CUR MEDS BY ELIG CLIN: HCPCS | Performed by: ORTHOPAEDIC SURGERY

## 2022-03-15 PROCEDURE — 4004F PT TOBACCO SCREEN RCVD TLK: CPT | Performed by: ORTHOPAEDIC SURGERY

## 2022-03-15 PROCEDURE — G8484 FLU IMMUNIZE NO ADMIN: HCPCS | Performed by: ORTHOPAEDIC SURGERY

## 2022-03-15 NOTE — PROGRESS NOTES
Chief Complaint   Patient presents with    Shoulder Pain     Left shoulder follow up. She did see relief from CSI . She starts PT in April         HPI:    Patient is 64 y.o. femalepresents today for 6-month follow-up after Left shoulder arthroscopy. Patient reports Left shoulder DOS 8/10/21. Shoulder was doing well but over the last couple of weeks it has been a little painful again. Still doing exercies with bands she got from PT. Previous treatments include rest, ice, heat, NSAIDs, HEP without much relief. No follow-up today, patient states that she had good relief from subacromial cortisone injection but that has started physical therapy which has increased the pain to a degree. ROS:    Skin: (-) rash,(-) psoriasis,(-) eczema, (-)skin cancer. Neurologic: (-)numbness, (-)tingling, (-)headaches, (-) LOC. Cardiovascular: (-) Chest pain, (-) swelling in legs/feet, (-) SOB, (-) cramping in legs/feet with walking.     All other review of systems negative except stated above or in HPI      Past Medical History:   Diagnosis Date    Arthritis     Bipolar 1 disorder ([facility]     Chronic fatigue     COPD (chronic obstructive pulmonary disease) (HCC)     Depression     Fibromyalgia     GERD (gastroesophageal reflux disease)     Migraines     Neuropathy     Short-term memory loss      Past Surgical History:   Procedure Laterality Date    CARDIOVASCULAR STRESS TEST  02/12/2021    Lexiscan stress test    CHOLECYSTECTOMY      COLONOSCOPY      ENDOSCOPY, COLON, DIAGNOSTIC      ESOPHAGEAL MOTILITY STUDY N/A 5/12/2021    ESOPHAGEAL MOTILITY/MANOMETRY STUDY performed by Karyna Melton DO at [facility] ARTHROSCOPY Left 8/10/2021    LEFT SHOULDER ARTHROSCOPY, SUBACROMIAL DECOMPRESSION, DEBRIDEMENT ([address]) performed by Jayden Shaver DO at [facility], [address]         Current Outpatient Medications:     docusate sodium (COLACE) 100 MG capsule, Take 1 capsule by mouth 2 times daily as needed for Constipation, Disp: 20 capsule, Rfl: 1    ketorolac (TORADOL) 10 MG tablet, Take 1 tablet by mouth every 6 hours as needed for Pain Patient received prior injection, Disp: 20 tablet, Rfl: 0    ondansetron (ZOFRAN) 4 MG tablet, Take 1 tablet by mouth every 8 hours as needed for Nausea or Vomiting, Disp: 20 tablet, Rfl: 0    cetirizine (ZYRTEC) 10 MG tablet, Take 10 mg by mouth daily, Disp: , Rfl:     pantoprazole (PROTONIX) 40 MG tablet, Take 40 mg by mouth daily, Disp: , Rfl:     SYMBICORT 160-4.5 MCG/ACT AERO, Inhale 2 puffs into the lungs 2 times daily as needed , Disp: , Rfl:     diphenoxylate-atropine (LOMOTIL) 2.5-0.025 MG per tablet, TAKE 1 TO 2 TABLETS BY MOUTH EVERY 8 HOURS AS NEEDED, Disp: , Rfl:     pregabalin (LYRICA) 75 MG capsule, TAKE 1 CAPSULE BY MOUTH FOUR TIMES DAILY, Disp: , Rfl:     risperiDONE (RISPERDAL) 2 MG tablet, TAKE 1 TABLET BY MOUTH AT BEDTIME, Disp: , Rfl:   Allergies   Allergen Reactions    Erythromycin Rash     Social History     Socioeconomic History    Marital status:      Spouse name: Not on file    Number of children: Not on file    Years of education: Not on file    Highest education level: Not on file   Occupational History    Not on file   Tobacco Use    Smoking status: Current Every Day Smoker     Packs/day: 1.50     Years: 44.00     Pack years: 66.00     Types: Cigarettes    Smokeless tobacco: Never Used   Vaping Use    Vaping Use: Never used   Substance and Sexual Activity    Alcohol use: No    Drug use: No    Sexual activity: Not on file   Other Topics Concern    Not on file   Social History Narrative    Not on file     Social Determinants of Health     Financial Resource Strain:     Difficulty of Paying Living Expenses: Not on file   Food Insecurity:     Worried About Running Out of Food in the Last Year: Not on file    [place] of Food in the Last Year: Not on file   Transportation Needs:     Lack of Transportation (Medical): Not on file    Lack of Transportation (Non-Medical): Not on file   Physical Activity:     Days of Exercise per Week: Not on file    Minutes of Exercise per Session: Not on file   Stress:     Feeling of Stress : Not on file   Social Connections:     Frequency of Communication with Friends and Family: Not on file    Frequency of Social Gatherings with Friends and Family: Not on file    Attends Episcopalian Services: Not on file    Active Member of [address] [place] [address] or Organizations: Not on file    Attends Club or Organization Meetings: Not on file    Marital Status: Not on file   Intimate Partner Violence:     Fear of Current or Ex-Partner: Not on file    Emotionally Abused: Not on file    Physically Abused: Not on file    Sexually Abused: Not on file   Housing Stability:     Unable to Pay for Housing in the Last Year: Not on file    Number of [place] in the Last Year: Not on file    Unstable Housing in the Last Year: Not on file     No family history on file. Physical Exam:    Ht 5' 3.5\" (1.613 m)   Wt 115 lb (52.2 kg)   BMI 20.05 kg/m²     GENERAL: alert, appears stated age, cooperative, no acute distress    HEENT: Head is normocephalic, atraumatic. PERRLA. SKIN: Clean, dry, intact. There is not any cellulitis or cutaneous lesions noted in the upper extremities    PULMONARY: breathing is regular and unlabored, no acute distress    CV: The bilateral upper and lower extremities are warm and well-perfused with brisk capillary refill. 2+ pulses UE and LE bilateral.     PSYCHIATRY: Pleasant mood, appropriate behavior, follows commands    NEURO: Sensation is intact distally with light touch with no alteration. Motor exam of the upper extremities show elbow flexion and extension, wrist flexion and extension, and finger abduction grossly intact 5/5. Upper extremity reflexes are bilaterally symmetrical and within normal limits.     LYMPH: No lymphedema present distally in upper or lower extremity. MUSCULOSKELETAL:  Examination of the Left shoulder shows: There is not a deformity. There is not erythema. There is not soft tissue swelling. Deltoid region is not tender to palpation. AC Joint is not tender to palpation. Clavicle is not tender to palpation. Bicipital Groove is not tender to palpation. Pectoralis  is not tender to palpation. Scapula/ trapezius is  tender to palpation. Right:  ROM Full, Strength: Supraspinatus 5/5, Infraspinatus 5/5, Subscapularis 5/5  Left:  /170/60, Strength: Supraspinatus 5/5, Infraspinatus 5/5, Subscapularis 5/5  LeftShoulder:  Crepitus:  no   Tenderness:  none   Effusion:   none   Impingement: negative   Empty Can:  negative   Speed's:  negative      Apprehension:  negative   Cross Arm Sign:  negative   Gage's:  negative       Neer's:  negative      Belly Press Test:  negative      Drop Arm Test:  negative         Imaging:  Surgical pictures and imaging reviewed with patient in detail      [facility] for shoulder pain. Diagnoses and all orders for this visit:    Rupture of left biceps tendon, initial encounter        Patient seen and examined. X-rays reviewed. Natural history and course discussed with patient. Treatment options discussed with patient in detail including risks and benefits. At this point patient has done well status post left shoulder arthroscopy. Patient does have a consider amount of existing glenohumeral arthritic change noted. Patient has done well however did not complete physical therapy due to pain of right shoulder. Patient is requesting continued chronic pain medication at this time. Patient was educated that she will be referred to pain management services at this time for treatment of chronic pain issues. Patient verbalized understanding wish to proceed with this treatment option. Patient is opting for ultrasound-guided left glenohumeral cortisone injection.   Continue with physical therapy. In a 15 minute assessment and discussion, patient was counseled on weight loss, healthy diet, and physical activity relating to this condition. She was educated with options in detail including nutrition, joining a health club/ weight loss program, and use of cardio equipment such as the Arc Trainer and the importance of use as well as range of motion and HEP exercises for weight loss and general health. Patient educated about the healing rates with this condition. Patient does smoke and does have secondhand smoke exposure. In this discussion of approximately 5 minutes, patient was counseled about decrease in smoking exposure regards to healing and overall good health. Patient seems reluctant but is willing to listen to the discussion in detail. She states that she will try to cut down as much as possible and states that she will try to quit completely by the next visit.       Cale Owens, DO

## 2022-03-29 RX ORDER — TRIAMCINOLONE ACETONIDE 40 MG/ML
40 INJECTION, SUSPENSION INTRA-ARTICULAR; INTRAMUSCULAR ONCE
Status: COMPLETED | OUTPATIENT
Start: 2022-03-29 | End: 2022-03-29

## 2022-03-29 RX ADMIN — TRIAMCINOLONE ACETONIDE 40 MG: 40 INJECTION, SUSPENSION INTRA-ARTICULAR; INTRAMUSCULAR at 14:28

## 2022-03-29 NOTE — PROGRESS NOTES
Chief Complaint   Patient presents with    Injections     left shoulder glenohumeral CSI under US          I will proceed with an cortisone injection in the Left shoulder using ultrasound guidance for accuracy. Risks and indications for ultrasound use were explained and patient agreeable. A [employer] ultrasound unit with a variable frequency linear transducer was used for this procedure. Verbal and written consent was obtained for the injections. The skin was prepped with alcohol. 1mL of Kenalog 40mg and 9mL of 0.25% Marcaine was injected to Left glenohumeral joint using ultrasound guidance The injection was given through the anterior aspect of the left shoulder. The patient tolerated the injection well. I will see the patient back in 4 weeks. Images are uploaded and stored in the [facility]   Diagnosis Orders   1.  Osteoarthritis of glenohumeral joint, left

## 2022-04-22 ENCOUNTER — OFFICE VISIT (OUTPATIENT)
Dept: PAIN MANAGEMENT | Age: 62
End: 2022-04-22
Payer: MEDICARE

## 2022-04-22 VITALS
BODY MASS INDEX: 20.49 KG/M2 | HEIGHT: 64 IN | SYSTOLIC BLOOD PRESSURE: 104 MMHG | HEART RATE: 84 BPM | WEIGHT: 120 LBS | DIASTOLIC BLOOD PRESSURE: 70 MMHG | TEMPERATURE: 96 F | OXYGEN SATURATION: 94 %

## 2022-04-22 DIAGNOSIS — M19.012 PRIMARY OSTEOARTHRITIS OF LEFT SHOULDER: ICD-10-CM

## 2022-04-22 DIAGNOSIS — M25.512 CHRONIC LEFT SHOULDER PAIN: ICD-10-CM

## 2022-04-22 DIAGNOSIS — F17.200 SMOKING: ICD-10-CM

## 2022-04-22 DIAGNOSIS — S46.212S RUPTURE OF LEFT BICEPS TENDON, SEQUELA: Primary | ICD-10-CM

## 2022-04-22 DIAGNOSIS — G89.29 CHRONIC LEFT SHOULDER PAIN: ICD-10-CM

## 2022-04-22 PROCEDURE — 3017F COLORECTAL CA SCREEN DOC REV: CPT | Performed by: ANESTHESIOLOGY

## 2022-04-22 PROCEDURE — G8427 DOCREV CUR MEDS BY ELIG CLIN: HCPCS | Performed by: ANESTHESIOLOGY

## 2022-04-22 PROCEDURE — 99204 OFFICE O/P NEW MOD 45 MIN: CPT | Performed by: ANESTHESIOLOGY

## 2022-04-22 PROCEDURE — G8420 CALC BMI NORM PARAMETERS: HCPCS | Performed by: ANESTHESIOLOGY

## 2022-04-22 PROCEDURE — 4004F PT TOBACCO SCREEN RCVD TLK: CPT | Performed by: ANESTHESIOLOGY

## 2022-04-22 PROCEDURE — 99203 OFFICE O/P NEW LOW 30 MIN: CPT | Performed by: ANESTHESIOLOGY

## 2022-04-22 RX ORDER — OLANZAPINE 7.5 MG/1
TABLET ORAL
COMMUNITY
Start: 2022-03-31 | End: 2022-04-22

## 2022-04-22 RX ORDER — TRAZODONE HYDROCHLORIDE 100 MG/1
TABLET ORAL
COMMUNITY
Start: 2022-04-12

## 2022-04-22 NOTE — PROGRESS NOTES
[address]        [address] [facility], [address] [facility]      [phone]                  Consult Note      Patient: Jose Pfeiffer [place] 1960    Date of Service:  04/22/22     Requesting Physician:  Dr. Bianca Sampson    Reason for Consult:      Patient presents with complaints of left shoulder pain    HISTORY OF PRESENT ILLNESS:      Ms. Jose Pfeiffer is a 64 y.o. female [facility] of Left shoulder pain. History of chronic left shoulder pain. Has undergone arthroscopic left shoulder surgery for left rotator cuff tear/shoulder impingement on 8/10/2021 by Dr. Bianca Sampson. Continues to have left shoulder pain. She is done post operative physical therapy and had shoulder injection. Denies upper extremity weakness or numbness/denies upper extremity radicular symptoms/ denies neck pain. Nursing notes and details of the pain history reviewed. Please see intake notes for details. NOTE:  H/o Fibromyalgia/ neuropathy- on Lyrica. H/o Depression/ anxiety- follows with psychiatry. Previous treatments:   Physical Therapy : yes,     Medications: - NSAID's : yes -            - Membrane stabilizers : yes             - Opioids : yes, for short duration            - Adjuvants or Others : yes,     Surgeries: yes, left shoulder arthroscopic surgery. Interventional Pain procedures/ nerve blocks: yes, left shoulder injection    H/O Smoking: yes  H/O alcohol abuse : denies  H/O Illicit drug use : yes- admits to  occasional THC use +    Employment: disability    Imaging:     MRI of the left shoulder 5/11/2021: (Prior to the surgery in August 2021): Impression   1.  Low-grade partial-thickness articular-surface tearing of mid infraspinatus   along the footplate.  Low-grade partial-thickness articular surface and   interstitial tearing of remainder of supraspinatus and anterior superior   infraspinatus between critical zone and footplate.  Moderate underlying   supraspinatus and mild underlying infraspinatus tendinopathy. 2. Low-grade partial-thickness interstitial tearing of the insertional fibers   of subscapularis.  Mild subscapularis tendinosis. 3. Mild tendinosis of the long head of the biceps tendon. 4. Mild diffuse labral degeneration.  Mild glenohumeral chondromalacia. Small glenohumeral joint effusion. 5. Mild degenerative change of the left AC joint. Past Medical History:   Diagnosis Date    Arthritis     Bipolar 1 disorder ([address]     Chronic fatigue     [facility]) ([facility])     Depression     Fibromyalgia     GERD (gastroesophageal reflux disease)     Migraines     Neuropathy     Short-term memory loss        Past Surgical History:   Procedure Laterality Date    CARDIOVASCULAR STRESS TEST  02/12/2021    Lexiscan stress test    CHOLECYSTECTOMY      COLONOSCOPY      ENDOSCOPY, COLON, DIAGNOSTIC      ESOPHAGEAL MOTILITY STUDY N/A 5/12/2021    ESOPHAGEAL MOTILITY/MANOMETRY STUDY performed by Vaughn Clay DO at [address] [address] ARTHROSCOPY Left 8/10/2021    LEFT SHOULDER ARTHROSCOPY, SUBACROMIAL DECOMPRESSION, DEBRIDEMENT (ARTHREX) performed by Saravanan Lino DO at [facility], [facility]         Prior to Admission medications    Medication Sig Start Date End Date Taking?  Authorizing Provider   ondansetron (ZOFRAN) 4 MG tablet Take 1 tablet by mouth every 8 hours as needed for Nausea or Vomiting 8/10/21  Yes Saravanan Lino DO   pantoprazole (PROTONIX) 40 MG tablet Take 40 mg by mouth daily   Yes Historical Provider, MD   SYMBICORT 160-4.5 MCG/ACT AERO Inhale 2 puffs into the lungs 2 times daily as needed  4/7/21  Yes Historical Provider, MD   diphenoxylate-atropine (LOMOTIL) 2.5-0.025 MG per tablet TAKE 1 TO 2 TABLETS BY MOUTH EVERY 8 HOURS AS NEEDED 4/6/21  Yes Historical Provider, MD   pregabalin (LYRICA) 75 MG capsule TAKE 1 CAPSULE BY MOUTH FOUR TIMES DAILY 4/21/21  Yes Historical Provider, MD   traZODone (DESYREL) 100 MG tablet TAKE 1 TABLET BY MOUTH AT BEDTIME 4/12/22   Historical Provider, MD       Allergies   Allergen Reactions    Erythromycin Rash       Social History     Socioeconomic History    Marital status:      Spouse name: Not on file    Number of children: Not on file    Years of education: Not on file    Highest education level: Not on file   Occupational History    Not on file   Tobacco Use    Smoking status: Current Every Day Smoker     Packs/day: 1.50     Years: 44.00     Pack years: 66.00     Types: Cigarettes    Smokeless tobacco: Never Used   Vaping Use    Vaping Use: Never used   Substance and Sexual Activity    Alcohol use: No    Drug use: No    Sexual activity: Not on file   Other Topics Concern    Not on file   Social History Narrative    Not on file     Social Determinants of Health     Financial Resource Strain:     Difficulty of Paying Living Expenses: Not on file   Food Insecurity:     Worried About Running Out of Food in the Last Year: Not on file    [place] of Food in the Last Year: Not on file   Transportation Needs:     Lack of Transportation (Medical): Not on file    Lack of Transportation (Non-Medical):  Not on file   Physical Activity:     Days of Exercise per Week: Not on file    Minutes of Exercise per Session: Not on file   Stress:     Feeling of Stress : Not on file   Social Connections:     Frequency of Communication with Friends and Family: Not on file    Frequency of Social Gatherings with Friends and Family: Not on file    Attends Yazidi Services: Not on file    Active Member of Clubs or Organizations: Not on file    Attends Club or Organization Meetings: Not on file    Marital Status: Not on file   Intimate Partner Violence:     Fear of Current or Ex-Partner: Not on file    Emotionally Abused: Not on file    Physically Abused: Not on file   Mignon Her Sexually Abused: Not on file   Housing Stability:     Unable to Pay for Housing in the Last Year: Not on file    Number of Places Lived in the Last Year: Not on file    Unstable Housing in the Last Year: Not on file       No family history on file. REVIEW OF SYSTEMS:     Patient specifically denies fever/chills, chest pain, shortness of breath, new bowel or bladder complaints. All other review of systems was negative. Review of Systems - Documented reviewed    PHYSICAL EXAMINATION:      /70   Pulse 84   Temp 96 °F (35.6 °C) (Infrared)   Ht 5' 3.5\" (1.613 m)   Wt 120 lb (54.4 kg)   SpO2 94%   BMI 20.92 kg/m²     General:      General appearance:  Pleasant and well-hydrated, in no distress and A & O x 3  Build:Normal Weight  Function: Rises from seated position easily and Moves about room without difficulty    HEENT:    Head:normocephalic, atraumatic  Pupils:regular, round, equal  Sclera: icterus absent    Lungs:    Breathing:normal breathing pattern     CVS:     RRR    Abdomen:    Shape:non-distended and normal    Cervical spine:    Inspection:normal  Palpation:tenderness paravertebral muscles, tenderness trapezium, left, right : no  Range of motion:Normal     Thoracic spine:     Spine inspection:normal     Lumbar spine:    Spine inspection: Normal   Palpation: Tenderness paravertebral muscles No  SLR : negative     Musculoskeletal:    Trigger points No     Extremities:    Tremors:None bilaterally upper and lower  Edema:no    Left Shoulder:  Swelling- no  ROM- pain on ROM mainly on abduction  Tenderness mild  Crepitus no    Neurological:    Sensory: Normal to light touch     No focal deficits. Dermatology:    Skin:no rashes or lesions noted    Assessment/Plan:     Diagnosis Orders   1. Rupture of left biceps tendon, sequela     2. Primary osteoarthritis of left shoulder     3. Chronic left shoulder pain     4.  Smoking         64 y.o. female with H/o chronic left shoulder pain, S/P left shoulder arthroscopic surgery in August 2021 by Ortho. Continues to have pain. Recent Ortho notes reviewed. Has tried PT/shoulder injections after surgery. Prior imaging reviewed  H/o smoking +  H/o occasional THC +  She is on Lyrica for history of fibromyalgia/ neuropathy. She is here to see whether she can get pain medications. Recommend physical therapy    NSAID's for as needed use    Compound cream prescription for local use. Use instructions reviewed. Caution with controlled meds in view of history of occasional THC use. Recommend follow-up with ortho if continued pain. No follow up made. Counseling : Patient encouraged to stay active and to continue Regular home exercise program as tolerated - stretching / strengthening. Smoking cessation counseling : yes     Treatment plan discussed with the patient including medication and procedure side effects. Controlled Substances Monitoring: [place]ARRS reviewed.     Etelvina Norris MD    CC:    Dr. Stuart Biswas, DO  [address] [facility] [address] [place] [address]

## 2022-04-22 NOTE — PROGRESS NOTES
Do you currently have any of the following:    Fever: No  Headache:  No  Cough: No  Shortness of breath: No  Exposed to anyone with these symptoms: No                                                                                                                Niels Olivera presents to the [facility] on 4/22/2022. Joyce Felipe is complaining of pain in left shoulder. The pain is constant. The pain is described as sharp. Pain is rated on her best day at a 4, on her worst day at a 8, and on average at a 7 on the VAS scale. She took her last dose of Lyrica today. Joyce Felipe does have issues with constipation. Any procedures since your last visit: No, with  % relief. She is not on NSAIDS and  is not on anticoagulation medications to include none and is managed by none. Pacemaker or defibrillator: No Physician managing device is none. Medication Contract and Consent for Opioid Use Documents Filed      No documents found                   There were no vitals taken for this visit. No LMP recorded. Patient has had a hysterectomy.

## 2022-04-27 ENCOUNTER — TELEPHONE (OUTPATIENT)
Dept: ORTHOPEDIC SURGERY | Age: 62
End: 2022-04-27

## 2022-04-27 NOTE — TELEPHONE ENCOUNTER
Patient calling stating she is in severe pain with her left shoulder. She is scheduled to see Dr Pj Diana on 5/5 but doesn't think she can wait that long because of the pain. She is asking to be moved to sooner date and time and also asking if someone would be able to check her if he cant. She would like some advice on what to do.  Please contact patient back to reschedule if able and to answer questions she may have

## 2022-04-27 NOTE — TELEPHONE ENCOUNTER
Patient requesting External referral to pain management LFT shoulder pain. Please advise patient [phone].

## 2022-04-29 ENCOUNTER — TELEPHONE (OUTPATIENT)
Dept: ADMINISTRATIVE | Age: 62
End: 2022-04-29

## 2022-04-29 NOTE — TELEPHONE ENCOUNTER
Pt called to see if she can get another CT of her Left shoulder before she comes in for her appt on 05/05 at 3:15. Please contact with any questions.

## 2022-04-29 NOTE — TELEPHONE ENCOUNTER
Patient has not had CT of her shoulder. She has had XR and MRI. She will need to see Dr Mitch Kan prior to any other imaging being ordered.

## 2022-05-05 ENCOUNTER — OFFICE VISIT (OUTPATIENT)
Dept: ORTHOPEDIC SURGERY | Age: 62
End: 2022-05-05
Payer: MEDICARE

## 2022-05-05 VITALS — BODY MASS INDEX: 20.49 KG/M2 | HEIGHT: 64 IN | WEIGHT: 120 LBS

## 2022-05-05 DIAGNOSIS — M54.12 CERVICAL RADICULOPATHY: Primary | ICD-10-CM

## 2022-05-05 DIAGNOSIS — M75.42 IMPINGEMENT SYNDROME OF LEFT SHOULDER: ICD-10-CM

## 2022-05-05 DIAGNOSIS — M75.102 NONTRAUMATIC TEAR OF LEFT ROTATOR CUFF, UNSPECIFIED TEAR EXTENT: ICD-10-CM

## 2022-05-05 PROCEDURE — 20610 DRAIN/INJ JOINT/BURSA W/O US: CPT | Performed by: ORTHOPAEDIC SURGERY

## 2022-05-05 RX ORDER — IBUPROFEN 800 MG/1
800 TABLET ORAL EVERY 8 HOURS PRN
Qty: 90 TABLET | Refills: 2 | Status: SHIPPED | OUTPATIENT
Start: 2022-05-05 | End: 2022-06-04

## 2022-05-05 RX ORDER — TRIAMCINOLONE ACETONIDE 40 MG/ML
40 INJECTION, SUSPENSION INTRA-ARTICULAR; INTRAMUSCULAR ONCE
Status: COMPLETED | OUTPATIENT
Start: 2022-05-05 | End: 2022-05-05

## 2022-05-05 RX ADMIN — TRIAMCINOLONE ACETONIDE 40 MG: 40 INJECTION, SUSPENSION INTRA-ARTICULAR; INTRAMUSCULAR at 15:50

## 2022-05-05 NOTE — PROGRESS NOTES
Chief Complaint   Patient presents with    Shoulder Pain     Left shoulder follow up. She has had worsening and constant pain since last visit. Did not have relief from [facility] injection. HPI:    Patient is 64 y.o. female presents today for follow-up of left shoulder pain. Patient reports Left shoulder DOS 8/10/21. Shoulder was doing well but over the last couple of weeks it has been a little painful again. Still doing exercies with bands she got from PT. Previous treatments include rest, ice, heat, NSAIDs, HEP without much relief. No follow-up today, patient states that she had good relief from subacromial cortisone injection but she also states that her pain is now all over her arm and shoots down into her hand. ROS:    Skin: (-) rash,(-) psoriasis,(-) eczema, (-)skin cancer. Neurologic: (-)numbness, (-)tingling, (-)headaches, (-) LOC. Cardiovascular: (-) Chest pain, (-) swelling in legs/feet, (-) SOB, (-) cramping in legs/feet with walking.     All other review of systems negative except stated above or in HPI      Past Medical History:   Diagnosis Date    Arthritis     Bipolar 1 disorder ([address]     Chronic fatigue     COPD (chronic obstructive pulmonary disease) (HCC)     Depression     Fibromyalgia     GERD (gastroesophageal reflux disease)     Migraines     Neuropathy     Short-term memory loss      Past Surgical History:   Procedure Laterality Date    CARDIOVASCULAR STRESS TEST  02/12/2021    Lexiscan stress test    CHOLECYSTECTOMY      COLONOSCOPY      ENDOSCOPY, COLON, DIAGNOSTIC      ESOPHAGEAL MOTILITY STUDY N/A 5/12/2021    ESOPHAGEAL MOTILITY/MANOMETRY STUDY performed by Mable Daugherty DO at [facility] ARTHROSCOPY Left 8/10/2021    LEFT SHOULDER ARTHROSCOPY, SUBACROMIAL DECOMPRESSION, DEBRIDEMENT ([address]) performed by Kyra Lechuga DO at [facility], [place]         Current Outpatient Medications:     ibuprofen (ADVIL;MOTRIN) 800 MG tablet, Take 1 tablet by mouth every 8 hours as needed for Pain, Disp: 90 tablet, Rfl: 2    traZODone (DESYREL) 100 MG tablet, TAKE 1 TABLET BY MOUTH AT BEDTIME, Disp: , Rfl:     ondansetron (ZOFRAN) 4 MG tablet, Take 1 tablet by mouth every 8 hours as needed for Nausea or Vomiting, Disp: 20 tablet, Rfl: 0    pantoprazole (PROTONIX) 40 MG tablet, Take 40 mg by mouth daily, Disp: , Rfl:     SYMBICORT 160-4.5 MCG/ACT AERO, Inhale 2 puffs into the lungs 2 times daily as needed , Disp: , Rfl:     diphenoxylate-atropine (LOMOTIL) 2.5-0.025 MG per tablet, TAKE 1 TO 2 TABLETS BY MOUTH EVERY 8 HOURS AS NEEDED, Disp: , Rfl:     pregabalin (LYRICA) 75 MG capsule, TAKE 1 CAPSULE BY MOUTH FOUR TIMES DAILY, Disp: , Rfl:   Allergies   Allergen Reactions    Erythromycin Rash     Social History     Socioeconomic History    Marital status:      Spouse name: Not on file    Number of children: Not on file    Years of education: Not on file    Highest education level: Not on file   Occupational History    Not on file   Tobacco Use    Smoking status: Current Every Day Smoker     Packs/day: 1.50     Years: 44.00     Pack years: 66.00     Types: Cigarettes    Smokeless tobacco: Never Used   Vaping Use    Vaping Use: Never used   Substance and Sexual Activity    Alcohol use: No    Drug use: No    Sexual activity: Not on file   Other Topics Concern    Not on file   Social History Narrative    Not on file     Social Determinants of Health     Financial Resource Strain:     Difficulty of Paying Living Expenses: Not on file   Food Insecurity:     Worried About Running Out of Food in the Last Year: Not on file    [place] of Food in the Last Year: Not on file   Transportation Needs:     Lack of Transportation (Medical): Not on file    Lack of Transportation (Non-Medical):  Not on file   Physical Activity:     Days of Exercise per Week: Not on file    Minutes of Exercise per Session: Not on file   Stress:  Feeling of Stress : Not on file   Social Connections:     Frequency of Communication with Friends and Family: Not on file    Frequency of Social Gatherings with Friends and Family: Not on file    Attends Christianity Services: Not on file    Active Member of Clubs or Organizations: Not on file    Attends Club or Organization Meetings: Not on file    Marital Status: Not on file   Intimate Partner Violence:     Fear of Current or Ex-Partner: Not on file    Emotionally Abused: Not on file    Physically Abused: Not on file    Sexually Abused: Not on file   Housing Stability:     Unable to Pay for Housing in the Last Year: Not on file    Number of [place] in the Last Year: Not on file    Unstable Housing in the Last Year: Not on file     No family history on file. Physical Exam:    Ht 5' 3.5\" (1.613 m)   Wt 120 lb (54.4 kg)   BMI 20.92 kg/m²     GENERAL: alert, appears stated age, cooperative, no acute distress    HEENT: Head is normocephalic, atraumatic. PERRLA. SKIN: Clean, dry, intact. There is not any cellulitis or cutaneous lesions noted in the lower extremities     PULMONARY: breathing is regular and unlabored, no acute distress     CV: The bilateral lower extremities are warm and well-perfused with brisk capillary refill. 2+ pulses LE bilateral.     ABDOMINAL: Nontender, nondistended     PSYCHIATRY: Pleasant mood, appropriate behavior, follows commands     NEURO: Sensation is intact distally with light touch with no alteration. Motor exam of the lower extremities show quadriceps, hamstrings, foot dorsiflexion and plantarflexion grossly intact 5/5. LYMPH: No lymphedema present distally in upper or lower extremity. MUSCULOSKELETAL:  Examination of the Left shoulder shows: There is not a deformity. There is not erythema. There is not soft tissue swelling. Deltoid region is not tender to palpation. AC Joint is not tender to palpation. Clavicle is not tender to palpation. Bicipital Groove is not tender to palpation. Pectoralis  is not tender to palpation. Scapula/ trapezius is  tender to palpation. Right:  ROM Full, Strength: Supraspinatus 5/5, Infraspinatus 5/5, Subscapularis 5/5  Left:  /170/60, Strength: Supraspinatus 5/5, Infraspinatus 5/5, Subscapularis 5/5  LeftShoulder:  Crepitus:  no   Tenderness:  none   Effusion:   none   Impingement: negative   Empty Can:  negative   Speed's:  negative      Apprehension:  negative   Cross Arm Sign:  negative   [place]'s:  negative       Neer's:  negative      Belly Press Test:  negative      Drop Arm Test:  negative         Imaging:  Surgical pictures and imaging reviewed with patient in detail      [facility] was seen today for shoulder pain. Diagnoses and all orders for this visit:    Cervical radiculopathy  -     Ambulatory referral to [id number]     Nontraumatic tear of left rotator cuff, unspecified tear extent  -     WI ARTHROCENTESIS ASPIR&/INJ MAJOR JT/BURSA W/O US    Impingement syndrome of left shoulder    Other orders  -     ibuprofen (ADVIL;MOTRIN) 800 MG tablet; Take 1 tablet by mouth every 8 hours as needed for Pain  -     triamcinolone acetonide (KENALOG-40) injection 40 mg        Patient seen and examined. X-rays reviewed. Natural history and course discussed with patient. Treatment options discussed with patient in detail including risks and benefits. At this point patient has done well status post left shoulder arthroscopy. Patient does have a consider amount of existing glenohumeral arthritic change noted. Patient has done well however did not complete physical therapy due to pain of right shoulder. Patient is requesting continued chronic pain medication at this time. Patient was educated that she will be referred to pain management services at this time for treatment of chronic pain issues. Patient verbalized understanding wish to proceed with this treatment option.   Patient is opting for left subacromial cortisone injection. Continue with physical therapy. Procedure Note Cortisone Injection to Shoulder    The left shoulder was identified as the injection site. The risk and benefits of a cortisone injection were explained and the patient consented to the injection. Under sterile conditions, the shoulder subacromial space was injected with a mixture of 40mg of Kenelog and Marcaine without complication. A sterile bandage was applied. In a 15 minute assessment and discussion, patient was counseled on weight loss, healthy diet, and physical activity relating to this condition. She was educated with options in detail including nutrition, joining a health club/ weight loss program, and use of cardio equipment such as the Arc Trainer and the importance of use as well as range of motion and HEP exercises for weight loss and general health. Patient educated about the healing rates with this condition. Patient does smoke and does have secondhand smoke exposure. In this discussion of approximately 5 minutes, patient was counseled about decrease in smoking exposure regards to healing and overall good health. Patient seems reluctant but is willing to listen to the discussion in detail. She states that she will try to cut down as much as possible and states that she will try to quit completely by the next visit. Lit Jarquin, DO         25 minutes was spent with patient. 50% or greater was spent counseling the patient.

## 2022-05-06 ENCOUNTER — TELEPHONE (OUTPATIENT)
Dept: ORTHOPEDIC SURGERY | Age: 62
End: 2022-05-06

## 2022-05-06 NOTE — TELEPHONE ENCOUNTER
Pt called in stating Dr. Kelle Campbell is suppose to send her for a test on her neck, she is not sure if its suppose to be an xray but would like to speak with someone regarding the test. Please, advise. Umesh Mar can be reached at [phone].

## 2022-05-06 NOTE — TELEPHONE ENCOUNTER
Pt called in stating she needs to update her pharmacy with the office, she now goes to   [address]  She will pick her script from [place] for the ibuprofen that was just sent over. Thank you.

## 2022-05-09 ENCOUNTER — TELEPHONE (OUTPATIENT)
Dept: ADMINISTRATIVE | Age: 62
End: 2022-05-09

## 2022-05-09 ENCOUNTER — TELEPHONE (OUTPATIENT)
Dept: ORTHOPEDIC SURGERY | Age: 62
End: 2022-05-09

## 2022-05-09 NOTE — TELEPHONE ENCOUNTER
Pt called in, wanting to know if the order was put in for a CT scan. Advised pt, the messages were sent over. She stated she really needs to have this done soon, she is in a lot of pain.

## 2022-05-09 NOTE — TELEPHONE ENCOUNTER
Pt would like to know if she can get a CT of her left shoulder and left arm, She states she is in a lot of pain. Please contact pt.

## 2022-05-09 NOTE — TELEPHONE ENCOUNTER
Pt called in wanting to know if she can get an order for a CT scan of her LT shoulder? Please, advise. Romina Sunshine can be reached at [phone]. Thank you.

## 2022-05-09 NOTE — TELEPHONE ENCOUNTER
Pt called in wanting to know if she can get an order for a CT scan of her LT shoulder? Please, advise. Kenn Kash can be reached at [phone]. Thank you.

## 2022-05-10 ENCOUNTER — TELEPHONE (OUTPATIENT)
Dept: ORTHOPEDIC SURGERY | Age: 62
End: 2022-05-10

## 2022-05-10 DIAGNOSIS — M75.102 NONTRAUMATIC TEAR OF LEFT ROTATOR CUFF, UNSPECIFIED TEAR EXTENT: Primary | ICD-10-CM

## 2022-05-16 ENCOUNTER — HOSPITAL ENCOUNTER (OUTPATIENT)
Dept: CT IMAGING | Age: 62
Discharge: HOME | End: 2022-05-16
Payer: MEDICARE

## 2022-05-16 DIAGNOSIS — M75.102 NONTRAUMATIC TEAR OF LEFT ROTATOR CUFF, UNSPECIFIED TEAR EXTENT: ICD-10-CM

## 2022-05-16 PROCEDURE — 73200 CT UPPER EXTREMITY W/O DYE: CPT

## 2022-05-18 ENCOUNTER — OFFICE VISIT (OUTPATIENT)
Dept: ORTHOPEDIC SURGERY | Age: 62
End: 2022-05-18
Payer: MEDICARE

## 2022-05-18 VITALS — WEIGHT: 120 LBS | BODY MASS INDEX: 20.49 KG/M2 | TEMPERATURE: 98 F | HEIGHT: 64 IN

## 2022-05-18 DIAGNOSIS — M54.12 CERVICAL RADICULOPATHY: ICD-10-CM

## 2022-05-18 DIAGNOSIS — M75.102 NONTRAUMATIC TEAR OF LEFT ROTATOR CUFF, UNSPECIFIED TEAR EXTENT: Primary | ICD-10-CM

## 2022-05-18 DIAGNOSIS — M19.012 OSTEOARTHRITIS OF GLENOHUMERAL JOINT, LEFT: ICD-10-CM

## 2022-05-18 DIAGNOSIS — M75.42 IMPINGEMENT SYNDROME OF LEFT SHOULDER: ICD-10-CM

## 2022-05-18 PROCEDURE — 99214 OFFICE O/P EST MOD 30 MIN: CPT | Performed by: ORTHOPAEDIC SURGERY

## 2022-05-18 PROCEDURE — 3017F COLORECTAL CA SCREEN DOC REV: CPT | Performed by: ORTHOPAEDIC SURGERY

## 2022-05-18 PROCEDURE — 4004F PT TOBACCO SCREEN RCVD TLK: CPT | Performed by: ORTHOPAEDIC SURGERY

## 2022-05-18 PROCEDURE — G8420 CALC BMI NORM PARAMETERS: HCPCS | Performed by: ORTHOPAEDIC SURGERY

## 2022-05-18 PROCEDURE — G8427 DOCREV CUR MEDS BY ELIG CLIN: HCPCS | Performed by: ORTHOPAEDIC SURGERY

## 2022-05-18 NOTE — PROGRESS NOTES
Chief Complaint   Patient presents with    Shoulder Pain     Left shoulder ct scan follow up         HPI:    Patient is 64 y.o. female presents today for follow-up of left shoulder pain. Patient reports Left shoulder DOS 8/10/21. Shoulder was doing well but over the last couple of weeks it has been a little painful again. Still doing exercies with bands she got from PT. Previous treatments include rest, ice, heat, NSAIDs, HEP without much relief. No follow-up today, patient states that she had good relief from subacromial cortisone injection but she also states that her pain is now all over her arm and shoots down into her hand. Follows up after CT scan. ROS:    Skin: (-) rash,(-) psoriasis,(-) eczema, (-)skin cancer. Neurologic: (-)numbness, (-)tingling, (-)headaches, (-) LOC. Cardiovascular: (-) Chest pain, (-) swelling in legs/feet, (-) SOB, (-) cramping in legs/feet with walking.     All other review of systems negative except stated above or in HPI      Past Medical History:   Diagnosis Date    Arthritis     Bipolar 1 disorder ([address]     Chronic fatigue     COPD (chronic obstructive pulmonary disease) (HCC)     Depression     Fibromyalgia     GERD (gastroesophageal reflux disease)     Migraines     Neuropathy     Short-term memory loss      Past Surgical History:   Procedure Laterality Date    CARDIOVASCULAR STRESS TEST  02/12/2021    Lexiscan stress test    CHOLECYSTECTOMY      COLONOSCOPY      ENDOSCOPY, COLON, DIAGNOSTIC      ESOPHAGEAL MOTILITY STUDY N/A 5/12/2021    ESOPHAGEAL MOTILITY/MANOMETRY STUDY performed by Nallely Adams DO at [facility] ARTHROSCOPY Left 8/10/2021    LEFT SHOULDER ARTHROSCOPY, SUBACROMIAL DECOMPRESSION, DEBRIDEMENT ([address]) performed by Marleni Rosales DO at [facility], [place]         Current Outpatient Medications:     ibuprofen (ADVIL;MOTRIN) 800 MG tablet, Take 1 tablet by mouth every 8 hours as needed for Pain, Disp: 90 tablet, Rfl: 2    traZODone (DESYREL) 100 MG tablet, TAKE 1 TABLET BY MOUTH AT BEDTIME, Disp: , Rfl:     ondansetron (ZOFRAN) 4 MG tablet, Take 1 tablet by mouth every 8 hours as needed for Nausea or Vomiting, Disp: 20 tablet, Rfl: 0    pantoprazole (PROTONIX) 40 MG tablet, Take 40 mg by mouth daily, Disp: , Rfl:     SYMBICORT 160-4.5 MCG/ACT AERO, Inhale 2 puffs into the lungs 2 times daily as needed , Disp: , Rfl:     diphenoxylate-atropine (LOMOTIL) 2.5-0.025 MG per tablet, TAKE 1 TO 2 TABLETS BY MOUTH EVERY 8 HOURS AS NEEDED, Disp: , Rfl:     pregabalin (LYRICA) 75 MG capsule, TAKE 1 CAPSULE BY MOUTH FOUR TIMES DAILY, Disp: , Rfl:   Allergies   Allergen Reactions    Erythromycin Rash     Social History     Socioeconomic History    Marital status:      Spouse name: Not on file    Number of children: Not on file    Years of education: Not on file    Highest education level: Not on file   Occupational History    Not on file   Tobacco Use    Smoking status: Current Every Day Smoker     Packs/day: 1.50     Years: 44.00     Pack years: 66.00     Types: Cigarettes    Smokeless tobacco: Never Used   Vaping Use    Vaping Use: Never used   Substance and Sexual Activity    Alcohol use: No    Drug use: No    Sexual activity: Not on file   Other Topics Concern    Not on file   Social History Narrative    Not on file     Social Determinants of Health     Financial Resource Strain:     Difficulty of Paying Living Expenses: Not on file   Food Insecurity:     Worried About Running Out of Food in the Last Year: Not on file    [place] of Food in the Last Year: Not on file   Transportation Needs:     Lack of Transportation (Medical): Not on file    Lack of Transportation (Non-Medical):  Not on file   Physical Activity:     Days of Exercise per Week: Not on file    Minutes of Exercise per Session: Not on file   Stress:     Feeling of Stress : Not on file   Social Connections:     Frequency of Communication with Friends and Family: Not on file    Frequency of Social Gatherings with Friends and Family: Not on file    Attends Protestant Services: Not on file    Active Member of Clubs or Organizations: Not on file    Attends Club or Organization Meetings: Not on file    Marital Status: Not on file   Intimate Partner Violence:     Fear of Current or Ex-Partner: Not on file    Emotionally Abused: Not on file    Physically Abused: Not on file    Sexually Abused: Not on file   Housing Stability:     Unable to Pay for Housing in the Last Year: Not on file    Number of [place] in the Last Year: Not on file    Unstable Housing in the Last Year: Not on file     No family history on file. Physical Exam:    Temp 98 °F (36.7 °C)   Ht 5' 3.5\" (1.613 m)   Wt 120 lb (54.4 kg)   BMI 20.92 kg/m²     GENERAL: alert, appears stated age, cooperative, no acute distress    HEENT: Head is normocephalic, atraumatic. PERRLA. SKIN: Clean, dry, intact. There is not any cellulitis or cutaneous lesions noted in the lower extremities     PULMONARY: breathing is regular and unlabored, no acute distress     CV: The bilateral lower extremities are warm and well-perfused with brisk capillary refill. 2+ pulses LE bilateral.     ABDOMINAL: Nontender, nondistended     PSYCHIATRY: Pleasant mood, appropriate behavior, follows commands     NEURO: Sensation is intact distally with light touch with no alteration. Motor exam of the lower extremities show quadriceps, hamstrings, foot dorsiflexion and plantarflexion grossly intact 5/5. LYMPH: No lymphedema present distally in upper or lower extremity. MUSCULOSKELETAL:  Examination of the Left shoulder shows: There is not a deformity. There is not erythema. There is not soft tissue swelling. Deltoid region is not tender to palpation. AC Joint is not tender to palpation. Clavicle is not tender to palpation.   Bicipital Groove is not tender to palpation. Pectoralis  is not tender to palpation. Scapula/ trapezius is  tender to palpation. Right:  ROM Full, Strength: Supraspinatus 5/5, Infraspinatus 5/5, Subscapularis 5/5  Left:  /170/60, Strength: Supraspinatus 5/5, Infraspinatus 5/5, Subscapularis 5/5  LeftShoulder:  Crepitus:  no   Tenderness:  none   Effusion:   none   Impingement: negative   Empty Can:  negative   Speed's:  negative      Apprehension:  negative   Cross Arm Sign:  negative   [place]'s:  negative       Neer's:  negative      Belly Press Test:  negative      Drop Arm Test:  negative     no change since last visit. Imaging:  Surgical pictures and imaging reviewed with patient in detail      CT SHOULDER LEFT WO CONTRAST    Result Date: 5/17/2022  EXAMINATION: CT OF THE LEFT SHOULDER WITHOUT CONTRAST 5/16/2022 12:35 pm TECHNIQUE: CT of the left shoulder was performed without the administration of intravenous contrast.  Multiplanar reformatted images are provided for review. Automated exposure control, iterative reconstruction, and/or weight based adjustment of the mA/kV was utilized to reduce the radiation dose to as low as reasonably achievable. COMPARISON: None. HISTORY ORDERING SYSTEM PROVIDED HISTORY: Nontraumatic tear of left rotator cuff, unspecified tear extent FINDINGS: Moderate degenerative changes of the glenohumeral and acromioclavicular joints. Linear lucency in the anterior margin of the humeral head laterally is consistent with previous surgery. There also some minimal degenerative cystic changes in the humeral head and superior glenoid. No dislocation or acute fracture. Soft tissues could be better assessed with MRI or arthrogram.  Allowing for this, small joint effusion is suspected. Mild supraspinatus volume loss. No obvious retracted rotator cuff tear. Mild emphysematous changes in the visualized left lung. Prominent degenerative changes. No acute bony abnormality. Shoulder joint effusion is suspected. MRI would be useful if symptoms persist. RECOMMENDATIONS: Trip Bone was seen today for shoulder pain. Diagnoses and all orders for this visit:    Nontraumatic tear of left rotator cuff, unspecified tear extent    Cervical radiculopathy    Impingement syndrome of left shoulder    Osteoarthritis of glenohumeral joint, left        Patient seen and examined. X-rays reviewed. Natural history and course discussed with patient. Treatment options discussed with patient in detail including risks and benefits. At this point patient has done well status post left shoulder arthroscopy. Patient does have a consider amount of existing glenohumeral arthritic change noted. Patient has done well however did not complete physical therapy due to pain of right shoulder. Patient is requesting continued chronic pain medication at this time. Patient was educated that she will be referred to pain management services at this time for treatment of chronic pain issues. Patient seen and examined. States he reviewed with patient in detail. Natural history and course discussed with patient in long discussion  Treatment options discussed with patient in detail including risks and benefits. In a 15 minute assessment and discussion, patient was counseled on weight loss, healthy diet, and physical activity relating to this condition. She was educated with options in detail including nutrition, joining a health club/ weight loss program, and use of cardio equipment such as the Arc Trainer and the importance of use as well as range of motion and HEP exercises for weight loss and general health. Patient educated about the healing rates with this condition. Patient does smoke and does have secondhand smoke exposure. In this discussion of approximately 5 minutes, patient was counseled about decrease in smoking exposure regards to healing and overall good health.  Patient seems reluctant but is willing to listen to the discussion in detail. She states that she will try to cut down as much as possible and states that she will try to quit completely by the next visit. Erendira Dhaliwal DO         25 minutes was spent with patient. 50% or greater was spent counseling the patient.

## 2022-05-19 ENCOUNTER — TELEPHONE (OUTPATIENT)
Dept: ADMINISTRATIVE | Age: 62
End: 2022-05-19

## 2022-05-19 NOTE — TELEPHONE ENCOUNTER
Patient calling to inquire if Dr. Christina Wagner inquired to Dr. Jo Steinberg about her shoulder. Please advise.

## 2022-05-20 ENCOUNTER — TELEPHONE (OUTPATIENT)
Dept: ADMINISTRATIVE | Age: 62
End: 2022-05-20

## 2022-05-20 NOTE — TELEPHONE ENCOUNTER
Pt called and stated she spoke with 's office and they asked for her Surgical Notes Left shoulder sent so he can review. Please contact with any questions.

## 2022-06-27 ENCOUNTER — TELEPHONE (OUTPATIENT)
Dept: ADMINISTRATIVE | Age: 62
End: 2022-06-27

## 2022-07-08 ENCOUNTER — TELEPHONE (OUTPATIENT)
Dept: ADMINISTRATIVE | Age: 62
End: 2022-07-08

## 2022-07-08 NOTE — TELEPHONE ENCOUNTER
Returned call to patient. Explained that we referred her to [facility] for the surgery and/or further treatment options and that Dr Odalis Ross was not planning surgery for her. Patient understands.

## 2022-07-21 ENCOUNTER — OFFICE VISIT (OUTPATIENT)
Dept: PHYSICAL MEDICINE AND REHAB | Age: 62
End: 2022-07-21
Payer: MEDICARE

## 2022-07-21 VITALS
WEIGHT: 120 LBS | HEART RATE: 89 BPM | HEIGHT: 63 IN | BODY MASS INDEX: 21.26 KG/M2 | DIASTOLIC BLOOD PRESSURE: 74 MMHG | SYSTOLIC BLOOD PRESSURE: 99 MMHG

## 2022-07-21 DIAGNOSIS — M79.602 LEFT ARM PAIN: Primary | ICD-10-CM

## 2022-07-21 PROCEDURE — 99204 OFFICE O/P NEW MOD 45 MIN: CPT | Performed by: PHYSICAL MEDICINE & REHABILITATION

## 2022-07-21 PROCEDURE — G8427 DOCREV CUR MEDS BY ELIG CLIN: HCPCS | Performed by: PHYSICAL MEDICINE & REHABILITATION

## 2022-07-21 PROCEDURE — G8420 CALC BMI NORM PARAMETERS: HCPCS | Performed by: PHYSICAL MEDICINE & REHABILITATION

## 2022-07-21 RX ORDER — OLANZAPINE 7.5 MG/1
7.5 TABLET ORAL NIGHTLY
COMMUNITY
Start: 2022-07-06

## 2022-07-21 RX ORDER — PREDNISONE 20 MG/1
60 TABLET ORAL DAILY
Qty: 30 TABLET | Refills: 0 | Status: SHIPPED | OUTPATIENT
Start: 2022-07-21 | End: 2022-07-31

## 2022-07-21 NOTE — PROGRESS NOTES
Margoth Holbrook D.O. [facility]  [address] [facility] [place]  Phone: [phone]  Fax: [phone]      PCP: Yanelis Self DO  Date of visit: 7/21/22    Chief Complaint   Patient presents with    Davonte Lu        Dear Dr. Sarah Treadwell    As you know,  Danya Cottrell is a 64 y.o.  right hand dominant woman with sudden onset of left arm pain after no known injury several months ago. She has a history of left rotator cuff repair one year ago and has seen both Dr. Sarah Treadwell and Dr. Amy Okeefe for a second opinion. Dr. Amy Okeefe recommended possible shoulder replacement. Now, the pain is constant and occurs daily. The pain is rated Pain Score:  10 - Worst pain ever, is described as sharp, and is located in the neck with radiation to the left hand. The symptoms have been unchanged since onset. The pain is better with nothing. The pain is worse with nothing in particular. I have reviewed the following medical records: Dr. Sarah Treadwell office note  Lab Results   Component Value Date     07/15/2011    K 3.9 07/15/2011     (H) 07/15/2011    CO2 28 07/15/2011    BUN <5 (L) 07/15/2011    CREATININE 0.7 07/15/2011    GLUCOSE 96 07/15/2011    CALCIUM 7.8 (L) 07/15/2011    PROT 5.6 (L) 07/14/2011    LABALBU 3.4 07/14/2011    BILITOT 0.5 07/14/2011    ALKPHOS 58 07/14/2011    AST 10 07/14/2011    ALT 9 (L) 07/14/2011    LABGLOM >60 07/15/2011       CT left shoulder reviewed showed OA with effusion. No cervical imaging avaiable. The prior treatment has included:\"nerve blocks\", PT, medication including Cymbalta, Lyrica, Trazodone, NSAID, Tylenol, subacromial bursa injection. An independent historian was not needed.      Past Medical History:   Diagnosis Date    Arthritis     Bipolar 1 disorder ([facility])     Chronic fatigue     COPD (chronic obstructive pulmonary disease) (HCC)     Depression     Fibromyalgia     GERD (gastroesophageal reflux disease) Migraines     Neuropathy     Short-term memory loss        Past Surgical History:   Procedure Laterality Date    CARDIOVASCULAR STRESS TEST  02/12/2021    Lexiscan stress test    CHOLECYSTECTOMY      COLONOSCOPY      ENDOSCOPY, COLON, DIAGNOSTIC      ESOPHAGEAL MOTILITY STUDY N/A 5/12/2021    ESOPHAGEAL MOTILITY/MANOMETRY STUDY performed by Jessi Oshea DO at [facility] (CERVIX STATUS UNKNOWN)      SHOULDER ARTHROSCOPY Left 8/10/2021    LEFT SHOULDER ARTHROSCOPY, SUBACROMIAL DECOMPRESSION, DEBRIDEMENT (ARTHREX) performed by Juan Mccormick DO at [address] History     Tobacco Use    Smoking status: Every Day     Packs/day: 1.50     Years: 44.00     Pack years: 66.00     Types: Cigarettes    Smokeless tobacco: Never   Vaping Use    Vaping Use: Never used   Substance Use Topics    Alcohol use: No    Drug use: No     No family history on file. Allergies   Allergen Reactions    Erythromycin Rash       Current Outpatient Medications   Medication Sig Dispense Refill    OLANZapine (ZYPREXA) 7.5 MG tablet Take 7.5 mg by mouth nightly      predniSONE (DELTASONE) 20 MG tablet Take 3 tablets by mouth in the morning for 10 days.  30 tablet 0    traZODone (DESYREL) 100 MG tablet TAKE 1 TABLET BY MOUTH AT BEDTIME      ondansetron (ZOFRAN) 4 MG tablet Take 1 tablet by mouth every 8 hours as needed for Nausea or Vomiting 20 tablet 0    pantoprazole (PROTONIX) 40 MG tablet Take 40 mg by mouth daily      diphenoxylate-atropine (LOMOTIL) 2.5-0.025 MG per tablet TAKE 1 TO 2 TABLETS BY MOUTH EVERY 8 HOURS AS NEEDED      pregabalin (LYRICA) 75 MG capsule TAKE 1 CAPSULE BY MOUTH FOUR TIMES DAILY      ibuprofen (ADVIL;MOTRIN) 800 MG tablet Take 1 tablet by mouth every 8 hours as needed for Pain 90 tablet 2    SYMBICORT 160-4.5 MCG/ACT AERO Inhale 2 puffs into the lungs 2 times daily as needed  (Patient not taking: Reported on 7/21/2022)       No current facility-administered medications for this visit. Review of Systems - For review of systems, positive symptoms are underlined and negative findings are not underlined. General: chills, fatigue, fever, malaise, night sweats, weight gain,  weight loss. Psychological: anxiety, depression, suicidal ideation, sleep disturbances, behavioral disorder, difficulty concentrating, disorientation, hallucinations, mood swings, obsessive thoughts, physical abuse,  sexual abuse. Ophthalmic: blurry vision, decreased vision, double vision, loss of vision, photophobia, use of corrective device. Ear Nose Throat: hearing loss, tinnitus, phonophobia, sensitivity to smells, vertigo, or vocal changes. Allergy/Immunology: seasonal allergies, watery eyes, itchy eyes, frequent infections. Hematological and Lymphatic: bleeding problems, blood clots, bruising,  yellowing of the skin, swollen lymph nodes. Endocrine:  polydypsia, polyuria, temperature intolerance. Respiratory: cough, shortness of breath, wheezing. Cardiovascular: syncope, chest pain, dyspnea on exertion, edema, irregular heartbeat,  palpitations. Gastrointestinal: abdominal pain, constipation, diarrhea,  decreased appetite, heartburn, hematemesis, melena, nausea, vomiting, stool incontinence, abnormal swallowing. Genito-Urinary: dysuria, hematuria, incontinence, frequency, urgency. Musculoskeletal: joint pain, stiffness, swelling, muscle pain, muscle  tenderness. Neurological: confusion, memory loss, dizziness, gait disturbance, headaches, impaired coordination, decreased balance, numbness/tingling, seizures, speech problems, tremors,weakness. Dermatological:  hair changes, nail changes, pruritus, rash. Physical Exam: Blood pressure 99/74, pulse 89, height 5' 3\" (1.6 m), weight 120 lb (54.4 kg). General: The patient is in no apparent distress. Body habitus is non-obese. HEENT: No rhinorrhea, sneezing, yawning, or lacrimation. No scleral icterus or conjunctival injection.  SKIN: No piloerection. No track marks. No rash. Normal turgor. No erythema or ecchymosis. Psychological: Mood and affect are appropriate. Hygiene is appropriate. Cardiovascular:  Heart is regular rate and rhythm. Peripheral pulses are 2+ at the dorsalis pedis, posterior tibial and radial arteries. There is no edema. Respiratory: Respirations are regular and unlabored. There is no cyanosis. Lymphatic: There is no cervical or inguinal lymphadenopathy. Gastrointestinal: Soft abdomen, non-tender. No pulsating abdominal mass. Genitourinary: No costovertebral angle tenderness. MSK: Cervical: Cervical lordosis increased,  thoracic kyphosis increased and lumbar lordosis normal. No superficial or bony tenderness. No tenderness to palpation at bilateral cervical paraspinals, bilateral upper trapezius. No tenderness of bilateral occipital notch,  sternocleidomastoid,  medial scapular muscles,  scalenes. No trigger points. No spasm. No edema, erythema, ecchymosis, effusion, instability, mass or deformity. No midline bony tenderness. Spurling is negative bilateral.  Cervical AROM in flexion is 60 degrees, in extension is 20 degrees, in left rotation is 60degrees, in right rotation is 60 degrees, in left lateral flexion is 30 degrees and in right lateral flexion is 30 degrees. There is no crepitus. bilateral Shoulder: No edema, erythema, ecchymosis, effusion, instability, mass or deformity. AROM bilateral shoulders is 90* in abduction and then pain limits. IR Is30* on left, ER is 45*. + painful arc. No crepitus. +tenderness to palpation at the acromioclavicular joint, subacromial space or biceps tendon insertion.  + Venancio-Hdz, Scarf,  Drop arm, and Speeds. 1 FB separation left AC joint. Neurologic: Awake, alert and oriented in three planes. Speech is fluent. No facial weakness. Tongue is midline. Hearing is intact for conversation. Pupils are equal and round. Extraocular muscles are intact.   Hearing is intact for conversation. Shoulder shrug symmetric. Sensation: Intact for light touch and pin prick in all upper and lower extremity dermatomes. Vibration and proprioception are intact at the bilateral first MTP. Strength: 5/5 in all myotomes in the upper and lower extremities. Muscle Tendon Reflexes: 2+ symmetric in the bilateral upper and lower extremities. Babinski is downgoing bilaterally. Lexine Ravel is negative bilaterally. Gait is Normal.   Romberg is negative. Heel and toe walk are normal.  Tandem walk is normal.  No clonus or spasticity. The patient was able to rise from a chair and squat without difficulty. There is no tremor. Impression:   1. Left arm pain        Plan:   I have ordered the following unique test(s):  Orders Placed This Encounter   Procedures    XR CERVICAL SPINE (4-5 VIEWS)     Standing Status:   Future     Standing Expiration Date:   7/21/2023    XR [facility] JOINTS BILATERAL W WO WEIGHTS     Standing Status:   Future     Standing Expiration Date:   7/22/2023     Order Specific Question:   Reason for exam:     Answer:   shoulder pain    EMG     Order Specific Question:   Which body part? Answer:   bilateral ue re left cervical radiculopathy     This has included the decision for minor procedure: EMG. Procedure risk factors were discussed. Prescription drug management has included:     Orders Placed This Encounter   Medications    predniSONE (DELTASONE) 20 MG tablet     Sig: Take 3 tablets by mouth in the morning for 10 days. Dispense:  30 tablet     Refill:  0    Discuss with Dr. Verna Casey regarding increasing Trazodone  Diagnosis and treatment were significantly impacted by social determinants of health including mental health issues. Follows with psychiatry and psychology for bipolar disorder and on complex medication regimen that significantly limits what medications we can use to treat her pain.      The patient was educated about the diagnosis, prognosis, indications, risks and benefits of treatment. An opportunity to ask questions was given to the patient and questions were answered. The patient agreed to proceed with the recommended treatment as described above. No follow-ups on file. Thank you for the consultation and for allowing me to participate in the care of this patient. Sincerely,         Chacho Taylor D.O., P.T.   Board Certified Physical Medicine and Rehabilitation  Board Certified Electrodiagnostic Medicine

## 2022-07-22 ENCOUNTER — TELEPHONE (OUTPATIENT)
Dept: PHYSICAL MEDICINE AND REHAB | Age: 62
End: 2022-07-22

## 2022-07-22 NOTE — TELEPHONE ENCOUNTER
Tried to contact Dr Sharyle Pates office to see if the physician would be ok to increase patient Trazadone to help with nerve pain. Called the office but they are closed on Friday will try again Monday.

## 2022-07-25 ENCOUNTER — TELEPHONE (OUTPATIENT)
Dept: PHYSICAL MEDICINE AND REHAB | Age: 62
End: 2022-07-25

## 2022-07-25 NOTE — TELEPHONE ENCOUNTER
Scheduled 7/29/22 for EMG. Patient called in complaining of a lot of pain and discomfort. Patient requesting appointment for EMG to be moved up to today if possible later this afternoon. Please advise patient [phone].

## 2022-07-25 NOTE — TELEPHONE ENCOUNTER
Spoke with patient and let her know that there are no sooner openings for her to get in before her Friday appointment. Patient verbalized understanding.

## 2022-07-25 NOTE — TELEPHONE ENCOUNTER
Called and spoke with Author Elaine from Dr Pritesh Rizzo office to see if ok with him to increase Trazadone to help with nerve pain. Author Elaine stated that once he responds she will call me back.

## 2022-07-29 ENCOUNTER — OFFICE VISIT (OUTPATIENT)
Dept: PHYSICAL MEDICINE AND REHAB | Age: 62
End: 2022-07-29
Payer: MEDICARE

## 2022-07-29 VITALS — WEIGHT: 120 LBS | BODY MASS INDEX: 21.26 KG/M2 | HEIGHT: 63 IN

## 2022-07-29 DIAGNOSIS — M79.602 LEFT ARM PAIN: Primary | ICD-10-CM

## 2022-07-29 PROCEDURE — 95886 MUSC TEST DONE W/N TEST COMP: CPT | Performed by: PHYSICAL MEDICINE & REHABILITATION

## 2022-07-29 PROCEDURE — 95912 NRV CNDJ TEST 11-12 STUDIES: CPT | Performed by: PHYSICAL MEDICINE & REHABILITATION

## 2022-07-29 RX ORDER — PREGABALIN 150 MG/1
150 CAPSULE ORAL 2 TIMES DAILY
Qty: 60 CAPSULE | Refills: 2 | Status: SHIPPED
Start: 2022-07-29 | End: 2022-08-02 | Stop reason: SDUPTHER

## 2022-07-29 NOTE — PROGRESS NOTES
[address] [facility]  Electrodiagnostic Laboratory  *Accredited by the [address] [place] [address] with exemplary status  [address] [facility] [place]  Phone: [phone]  Fax: [phone]    Referring Provider: Richmond Haque DO  Primary Care Physician: Nahid Vu DO  Patient Name: Alma Newell  Patient YOB: 1960  Gender: female  BMI: Body mass index is 21.26 kg/m². Height 5' 3\" (1.6 m), weight 120 lb (54.4 kg). 7/29/2022    Reason for Referral: Cervical radic    Description of clinical problem:   Chief Complaint   Patient presents with    Extremity Pain     Pain in the left arm, in the shoulder down into the wrist. 10/10 pain. 6 month of symp. No acute injury. Numbness     none    Extremity Weakness     Decrease strength in left arm. Sensory NCS      Nerve / Sites Rec. Site Peak Lat PP Amp Segments Distance Velocity Temp. ms µV  cm m/s °C   L Median - Digit II (Antidromic)      Palm Dig II 1.88 52.7 Palm - Dig II 7 58 32.3      Wrist Dig II 3.23 51.3 Wrist - Dig II 14 58 32.3   L Ulnar - Digit V (Antidromic)      Wrist Dig V 3.65 61.2 Wrist - Dig V 14 51 32.3   L Radial - Anatomical snuff box (Forearm)      Forearm Wrist 2.45 25.0 Forearm - Wrist 10 56 32.5       Combined Sensory Index      Nerve / Sites Rec. Site Peak Lat NP Amp PP Amp Segments Dist. Peak Diff Temp.      ms µV µV  cm ms °C   L Median - CSI      Median Thumb 2.86 37.2 63.1 Median - Radial 10 -0.21 32.3      Radial Thumb 3.07 8.1 14.1 Median - Ulnar 14 0.21 32.3      Median Ring 3.70 15.4 12.2 Median palm - Ulnar palm 8 -0.16 32.3      Ulnar Ring 3.49 9.7 22.3          Median palm Wrist 1.77 75.0 104.8          Ulnar palm Wrist 1.93 17.1 45.3          CSI     CSI  0.16        Motor NCS      Nerve / Sites Muscle Onset Amplitude Segments Distance Velocity Temp.     ms mV  cm m/s °C   L Median - APB      Palm APB 1.88 11.3 Palm - APB   32.5      Wrist APB 3.18 6.4 Wrist - Palm 8 61 32.5      Elbow APB 6.61 6.1 Elbow - Wrist 18 52 32.5   L Ulnar - ADM      Wrist ADM 3.07 7.1 Wrist - ADM 8  32.5      B. Elbow ADM 6.15 7.1 B. Elbow - Wrist 17 55 32.5      A. Elbow ADM 8.23 6.7 A. Elbow - B. Elbow 10 48 32.5       F  Wave      Nerve Fmin % F    ms %   L Median - APB 24.79 50   L Ulnar - ADM 26.25 90       EMG      EMG Summary Table     Spontaneous MUAP Recruitment   Muscle Nerve Roots IA Fib PSW Fasc Amp Dur. PPP Pattern   L. Biceps brachii Musculocutaneous C5-C6 N 1+ 1+ None N N N Reduced   L. Triceps brachii Radial C6-C8 N None None None N N N N   L. Pronator teres Median C6-C7 N 1+ 1+ None N N N N   L. First dorsal interosseous Ulnar C8-T1 N None None None N N N N   L. Abductor pollicis brevis Median P6-Y0 N None None None N N N N   L. Cervical paraspinals (low)  - N None None None N N N N   L. Cervical paraspinals (mid)  - N 1+ 1+ None N N N N        Study Limitations: None  Summary of Findings:   Nerve conduction studies:   All nerve conduction studies, as listed in the table were normal in latency, amplitude and conduction velocity. Needle EMG:   Needle EMG was performed using a concentric needle. The following abnormalities were seen on needle EMG: positive sharp waves and fibrillation potentials were present in the left biceps and pronator teres. Complex repetitive discharges were present in the mid cervical paraspinals. Reduced motor unit recruitment was present in the left biceps brachii. Otherwise, observed motor units were normal in amplitude, duration, phases and recruitment and no active denervation signs were seen. Diagnostic Interpretation: This study was abnormal.     Electrodiagnosis: There is electrodiagnostic evidence of acervical radiculopathy. Location: left C6.    Nature: [ Gabe Bonds ] Axonal   [  ] Demyelinating  [  ] Mixed axonal and demyelinating     [  ] Sensory [ X ] Motor               [  ] Mixed sensorimotor     Gandalf.Pott  ] with active denervation       [  ] without active denervation  Duration: Chronic  Severity: moderate  Prognosis: Poor    Previous Study: There is not a prior study for comparison. Follow up EMG is recommended if clinically warranted     Technologist: Alem Lorenzana  Physician:  Anam Baeza D.O., P.T. Board Certified Physical Medicine and Rehabilitation  Board Certified Electrodiagnostic Medicine      Nerve conduction studies and electromyography were performed according to our laboratory policies and procedures which can be provided upon request. All abnormal values are identified in the table.  Laboratory normal values can also be provided upon request.       Cc: Chas Leory DO  [address], DO

## 2022-07-29 NOTE — PATIENT INSTRUCTIONS
Electrodiagnotic Laboratory  Accredited by the [facility] with Exemplary status  LAWRENCE Tabares D.O. [facility]  [address] [place]  Phone: [phone]  Fax: [phone]        Today you had an electrodiagnostic exam which included nerve conduction studies (NCS) and electromyography (EMG). This test evaluated the electrical activity of your nerves and muscles to help determine if you have a nerve or muscle disease. This test can help determine the location and type of a nerve or muscle problem. This will help your referring doctor diagnose your condition and determine the appropriate next step in your treatment plan. After your test:    1. There are no long lasting side effects of the test.     2. You may resume your normal activities without restrictions. 3.  Resume any medications that were stopped for the test.     4  If you have sore areas or bruising in your muscles where the needle was placed, apply a cold pack to the sore area for 15-20 minutes three to four times a day as needed for pain. The soreness should go away in about 1-2 days. 5. Your results were provided  Briefly at the end of your test and the final detailed report will be provided to your referring physician, and/or primary care physician and any other parties you requested within 1-2 days of the examination. You may wish to contact your referring provider after a few days to determine what they would like you to do next. 6.  Please call [phone] with any questions or concerns and if you develop increased body temperature/fever, swelling, tenderness, increased pain and/or drainage from the sites where the needle was placed. Thank you for choosing us for your health care needs.  [id number]

## 2022-08-01 ENCOUNTER — TELEPHONE (OUTPATIENT)
Dept: PHYSICAL MEDICINE AND REHAB | Age: 62
End: 2022-08-01

## 2022-08-01 DIAGNOSIS — M79.602 LEFT ARM PAIN: ICD-10-CM

## 2022-08-01 NOTE — TELEPHONE ENCOUNTER
Called and spoke with Jeovany from Dr [facility] Chauhan to find out if it is ok with the physician to see if ok to increase Trazadone to help with nerve pain. Will wait for a return call from the office.

## 2022-08-01 NOTE — TELEPHONE ENCOUNTER
Pt called in requesting to speak with you regarding a medication change.  Selene Blackburn can be reached at [phone]

## 2022-08-01 NOTE — TELEPHONE ENCOUNTER
Patient called and left message on office voicemail that she was calling regarding her Lyrica 150 mg she stated that she can not function on this dose and would like to go back to Lyrica 75 mg she was on before. She stated that she will need a new script for Lyrica 75 mg sent to [facility] in [place]. Please advise.

## 2022-08-01 NOTE — TELEPHONE ENCOUNTER
Called patient back and informed her that once the physician send over the script we will contact her. Patient aware and understands.

## 2022-08-02 RX ORDER — PREGABALIN 150 MG/1
150 CAPSULE ORAL 2 TIMES DAILY
Qty: 60 CAPSULE | Refills: 2 | Status: SHIPPED | OUTPATIENT
Start: 2022-08-02 | End: 2022-09-01

## 2022-08-02 NOTE — TELEPHONE ENCOUNTER
[facility] dose to Lyrica 75 mg. She will fill the Lyrica 75 mg.

## 2022-08-02 NOTE — TELEPHONE ENCOUNTER
Attempted to contact patient to notify her that the Lyrica 75 mg 4 times daily was phoned into the pharmacy and they are filling the script. Unable to leave VM.

## 2022-08-05 ENCOUNTER — TELEPHONE (OUTPATIENT)
Dept: PHYSICAL MEDICINE AND REHAB | Age: 62
End: 2022-08-05

## 2022-08-05 NOTE — TELEPHONE ENCOUNTER
Patient called in and stating she is being treated for her shoulder/arm pain and that she is requesting meds for her pain, explained that Dr. Anibal Jenkins not in office until Tuesday but I would relay phone message to Dr. Anbial Jenkins and when Dr. Teresa Redman back with us we will let her know. Advised patient to take tylenol for pain or follow up with PCP or go to emergency room if pain that severe, verbalizes understanding.

## 2022-08-08 NOTE — TELEPHONE ENCOUNTER
Called and spoke with Roseanna Valderrama from Dr Liliane Kenyon office and he has not responded from the message sent to him on 8-1-22. She will resend Dr Liliane Kenyon the message. She will call us back when he responds.

## 2022-08-09 NOTE — TELEPHONE ENCOUNTER
Called and spoke with the patient and informed her that the physician prescribed her lyrica at her last visit and that is to help with pain. Patient is aware and voiced understanding.

## 2022-08-10 ENCOUNTER — APPOINTMENT (OUTPATIENT)
Dept: GENERAL RADIOLOGY | Age: 62
End: 2022-08-10
Payer: MEDICARE

## 2022-08-10 ENCOUNTER — HOSPITAL ENCOUNTER (EMERGENCY)
Age: 62
Discharge: HOME | End: 2022-08-10
Attending: EMERGENCY MEDICINE
Payer: MEDICARE

## 2022-08-10 VITALS
RESPIRATION RATE: 18 BRPM | WEIGHT: 120 LBS | HEART RATE: 79 BPM | DIASTOLIC BLOOD PRESSURE: 53 MMHG | SYSTOLIC BLOOD PRESSURE: 106 MMHG | HEIGHT: 64 IN | BODY MASS INDEX: 20.49 KG/M2 | TEMPERATURE: 98.6 F | OXYGEN SATURATION: 96 %

## 2022-08-10 DIAGNOSIS — U07.1 COVID-19: Primary | ICD-10-CM

## 2022-08-10 LAB
ALBUMIN SERPL-MCNC: 3.9 G/DL (ref 3.5–5.2)
ALP BLD-CCNC: 60 U/L (ref 35–104)
ALT SERPL-CCNC: 13 U/L (ref 0–32)
ANION GAP SERPL CALCULATED.3IONS-SCNC: 9 MMOL/L (ref 7–16)
AST SERPL-CCNC: 9 U/L (ref 0–31)
BASOPHILS ABSOLUTE: 0.02 E9/L (ref 0–0.2)
BASOPHILS RELATIVE PERCENT: 0.1 % (ref 0–2)
BILIRUB SERPL-MCNC: 0.3 MG/DL (ref 0–1.2)
BUN BLDV-MCNC: 20 MG/DL (ref 6–23)
CALCIUM SERPL-MCNC: 9.1 MG/DL (ref 8.6–10.2)
CHLORIDE BLD-SCNC: 105 MMOL/L (ref 98–107)
CO2: 27 MMOL/L (ref 22–29)
CREAT SERPL-MCNC: 0.8 MG/DL (ref 0.5–1)
EOSINOPHILS ABSOLUTE: 0.04 E9/L (ref 0.05–0.5)
EOSINOPHILS RELATIVE PERCENT: 0.3 % (ref 0–6)
GFR AFRICAN AMERICAN: >60
GFR NON-AFRICAN AMERICAN: >60 ML/MIN/1.73
GLUCOSE BLD-MCNC: 101 MG/DL (ref 74–99)
HCT VFR BLD CALC: 44.5 % (ref 34–48)
HEMOGLOBIN: 14.1 G/DL (ref 11.5–15.5)
IMMATURE GRANULOCYTES #: 0.09 E9/L
IMMATURE GRANULOCYTES %: 0.7 % (ref 0–5)
LYMPHOCYTES ABSOLUTE: 1.14 E9/L (ref 1.5–4)
LYMPHOCYTES RELATIVE PERCENT: 8.3 % (ref 20–42)
MCH RBC QN AUTO: 29.6 PG (ref 26–35)
MCHC RBC AUTO-ENTMCNC: 31.7 % (ref 32–34.5)
MCV RBC AUTO: 93.5 FL (ref 80–99.9)
MONOCYTES ABSOLUTE: 0.89 E9/L (ref 0.1–0.95)
MONOCYTES RELATIVE PERCENT: 6.5 % (ref 2–12)
NEUTROPHILS ABSOLUTE: 11.57 E9/L (ref 1.8–7.3)
NEUTROPHILS RELATIVE PERCENT: 84.1 % (ref 43–80)
PDW BLD-RTO: 13.8 FL (ref 11.5–15)
PLATELET # BLD: 210 E9/L (ref 130–450)
PMV BLD AUTO: 11.4 FL (ref 7–12)
POTASSIUM REFLEX MAGNESIUM: 4.5 MMOL/L (ref 3.5–5)
RBC # BLD: 4.76 E12/L (ref 3.5–5.5)
SODIUM BLD-SCNC: 141 MMOL/L (ref 132–146)
TOTAL PROTEIN: 6.4 G/DL (ref 6.4–8.3)
TROPONIN, HIGH SENSITIVITY: 6 NG/L (ref 0–9)
WBC # BLD: 13.8 E9/L (ref 4.5–11.5)

## 2022-08-10 PROCEDURE — 85025 COMPLETE CBC W/AUTO DIFF WBC: CPT

## 2022-08-10 PROCEDURE — 93005 ELECTROCARDIOGRAM TRACING: CPT | Performed by: EMERGENCY MEDICINE

## 2022-08-10 PROCEDURE — 80053 COMPREHEN METABOLIC PANEL: CPT

## 2022-08-10 PROCEDURE — 36415 COLL VENOUS BLD VENIPUNCTURE: CPT

## 2022-08-10 PROCEDURE — 84484 ASSAY OF TROPONIN QUANT: CPT

## 2022-08-10 PROCEDURE — 99285 EMERGENCY DEPT VISIT HI MDM: CPT

## 2022-08-10 PROCEDURE — 71046 X-RAY EXAM CHEST 2 VIEWS: CPT

## 2022-08-10 RX ORDER — CEFDINIR 300 MG/1
300 CAPSULE ORAL 2 TIMES DAILY
Qty: 14 CAPSULE | Refills: 0 | Status: SHIPPED | OUTPATIENT
Start: 2022-08-10 | End: 2022-08-17

## 2022-08-10 RX ORDER — DOXYCYCLINE HYCLATE 100 MG
100 TABLET ORAL 2 TIMES DAILY
Qty: 14 TABLET | Refills: 0 | Status: SHIPPED | OUTPATIENT
Start: 2022-08-10 | End: 2022-08-17

## 2022-08-10 ASSESSMENT — PAIN - FUNCTIONAL ASSESSMENT: PAIN_FUNCTIONAL_ASSESSMENT: NONE - DENIES PAIN

## 2022-08-10 NOTE — ED PROVIDER NOTES
HPI:  8/10/22, Time: 5:38 PM EDT         Kevin Oneill is a 64 y.o. female presenting to the ED for fatigue beginning  a few days ago. The complaint has been persistent, mild in severity, and worsened by nothing. Patient was diagnosed with COVID 8 days ago. She has a history of COPD. Not on home oxygen. Her PCP started her on prednisone. She's requesting an antibiotic. No recent antibiotics. Has been having fever, chills, cough. ROS:   Pertinent positives and negatives are stated within HPI, all other systems reviewed and are negative.  --------------------------------------------- PAST HISTORY ---------------------------------------------  Past Medical History:  has a past medical history of Arthritis, Bipolar 1 disorder ([facility] [address] Chronic fatigue, COPD (chronic obstructive pulmonary disease) ([facility] [address] Depression, Fibromyalgia, GERD (gastroesophageal reflux disease), Migraines, Neuropathy, and Short-term memory loss. Past Surgical History:  has a past surgical history that includes Hysterectomy; Cholecystectomy; sinus surgery; Colonoscopy; Endoscopy, colon, diagnostic; cardiovascular stress test (02/12/2021); esophageal motility study (N/A, 5/12/2021); and Shoulder arthroscopy (Left, 8/10/2021). Social History:  reports that she has been smoking cigarettes. She has a 22.00 pack-year smoking history. She has never used smokeless tobacco. She reports that she does not drink alcohol and does not use drugs. Family History: family history is not on file. The patients home medications have been reviewed.     Allergies: Erythromycin    ---------------------------------------------------PHYSICAL   Constitutional/General: Alert and oriented x3, well appearing, non toxic in NAD  Head: Normocephalic and atraumatic  Eyes: PERRL, EOMI  Mouth: Oropharynx clear, handling secretions, no trismus  Neck: Supple, full ROM, non tender to palpation in the midline, no stridor, no crepitus, no meningeal signs  Pulmonary: Lungs clear to auscultation bilaterally, no wheezes, rales, or rhonchi. Not in respiratory distress  Cardiovascular:  Regular rate. Regular rhythm. No murmurs, gallops, or rubs. 2+ distal pulses  Chest: no chest wall tenderness  Abdomen: Soft. Non tender. Non distended. +BS. No rebound, guarding, or rigidity. No pulsatile masses appreciated. Musculoskeletal: Moves all extremities x 4. Warm and well perfused, no clubbing, cyanosis, or edema. Capillary refill <3 seconds  Skin: warm and dry. No rashes. Neurologic: GCS 15, CN 2-12 grossly intact, no focal deficits, symmetric strength 5/5 in the upper and lower extremities bilaterally  Psych: Normal Affect    -------------------------------------------------- RESULTS -------------------------------------------------  I have personally reviewed all laboratory and imaging results for this patient. Results are listed below.      LABS:  Results for orders placed or performed during the hospital encounter of 08/10/22   CBC with Auto Differential   Result Value Ref Range    WBC 13.8 (H) 4.5 - 11.5 E9/L    RBC 4.76 3.50 - 5.50 E12/L    Hemoglobin 14.1 11.5 - 15.5 g/dL    Hematocrit 44.5 34.0 - 48.0 %    MCV 93.5 80.0 - 99.9 fL    MCH 29.6 26.0 - 35.0 pg    MCHC 31.7 (L) 32.0 - 34.5 %    RDW 13.8 11.5 - 15.0 fL    Platelets 148 690 - 904 E9/L    MPV 11.4 7.0 - 12.0 fL    Neutrophils % 84.1 (H) 43.0 - 80.0 %    Immature Granulocytes % 0.7 0.0 - 5.0 %    Lymphocytes % 8.3 (L) 20.0 - 42.0 %    Monocytes % 6.5 2.0 - 12.0 %    Eosinophils % 0.3 0.0 - 6.0 %    Basophils % 0.1 0.0 - 2.0 %    Neutrophils Absolute 11.57 (H) 1.80 - 7.30 E9/L    Immature Granulocytes # 0.09 E9/L    Lymphocytes Absolute 1.14 (L) 1.50 - 4.00 E9/L    Monocytes Absolute 0.89 0.10 - 0.95 E9/L    Eosinophils Absolute 0.04 (L) 0.05 - 0.50 E9/L    Basophils Absolute 0.02 0.00 - 0.20 E9/L   Comprehensive Metabolic Panel w/ Reflex to MG   Result Value Ref Range    Sodium 141 132 - 146 mmol/L    Potassium reflex Magnesium 4.5 3.5 - 5.0 mmol/L    Chloride 105 98 - 107 mmol/L    CO2 27 22 - 29 mmol/L    Anion Gap 9 7 - 16 mmol/L    Glucose 101 (H) 74 - 99 mg/dL    BUN 20 6 - 23 mg/dL    Creatinine 0.8 0.5 - 1.0 mg/dL    GFR Non-African American >60 >=60 mL/min/1.73    GFR African American >60     Calcium 9.1 8.6 - 10.2 mg/dL    Total Protein 6.4 6.4 - 8.3 g/dL    Albumin 3.9 3.5 - 5.2 g/dL    Total Bilirubin 0.3 0.0 - 1.2 mg/dL    Alkaline Phosphatase 60 35 - 104 U/L    ALT 13 0 - 32 U/L    AST 9 0 - 31 U/L   Troponin   Result Value Ref Range    Troponin, High Sensitivity 6 0 - 9 ng/L       RADIOLOGY:  Interpreted by Radiologist.  XR CHEST (2 VW)   Final Result   No pneumonia or pleural effusion. EKG Interpretation  Interpreted by emergency department physician    Rhythm: sinus tachycardia  Rate: tachycardia  Axis: normal  Conduction: normal  ST Segments: no acute change  T Waves: no acute change    Clinical Impression: no acute changes  Comparison to prior EKG: None      ------------------------- NURSING NOTES AND VITALS REVIEWED ---------------------------   The nursing notes within the ED encounter and vital signs as below have been reviewed by myself. BP (!) 106/53   Pulse 77   Temp 98.6 °F (37 °C) (Oral)   Resp 18   Ht 5' 3.5\" (1.613 m)   Wt 120 lb (54.4 kg)   SpO2 96%   BMI 20.92 kg/m²   Oxygen Saturation Interpretation: Normal    The patients available past medical records and past encounters were reviewed. ------------------------------ ED COURSE/MEDICAL DECISION MAKING----------------------  Medications - No data to display          Medical Decision Making:    Labs obtained. WBC elevated. Patient is on prednisone. No pneumonia seen on CXR. Patient has history of COPD. Will start on omnicef and doxycycline to cover for possible developing pneumonia.  Patient told to take medication prescribed as directed, to follow up with PCP for a re-evaluation this week, and to return for worsening symptoms. Re-Evaluations:             Re-evaluation. Patients symptoms are improving        This patient's ED course included: a personal history and physicial examination, re-evaluation prior to disposition, multiple bedside re-evaluations, cardiac monitoring, continuous pulse oximetry, and a personal history and physicial eaxmination    This patient has remained hemodynamically stable during their ED course. Counseling: The emergency provider has spoken with the patient and discussed todays results, in addition to providing specific details for the plan of care and counseling regarding the diagnosis and prognosis. Questions are answered at this time and they are agreeable with the plan.       --------------------------------- IMPRESSION AND DISPOSITION ---------------------------------    IMPRESSION  1. COVID-19        DISPOSITION  Disposition: Discharge to home  Patient condition is stable        NOTE: This report was transcribed using voice recognition software.  Every effort was made to ensure accuracy; however, inadvertent computerized transcription errors may be present          Josiah Duran MD  08/10/22 Estefanía Sanders MD  08/10/22 [id number]

## 2022-08-11 LAB
EKG ATRIAL RATE: 125 BPM
EKG P AXIS: 66 DEGREES
EKG P-R INTERVAL: 130 MS
EKG Q-T INTERVAL: 204 MS
EKG QRS DURATION: 74 MS
EKG QTC CALCULATION (BAZETT): 294 MS
EKG R AXIS: 15 DEGREES
EKG T AXIS: 61 DEGREES
EKG VENTRICULAR RATE: 125 BPM

## 2022-08-15 ENCOUNTER — TELEPHONE (OUTPATIENT)
Dept: PHYSICAL MEDICINE AND REHAB | Age: 62
End: 2022-08-15

## 2022-08-16 NOTE — TELEPHONE ENCOUNTER
Patient returning call. Please advise [phone].  Patient states will empty mailbox on [employer]

## 2022-08-16 NOTE — TELEPHONE ENCOUNTER
Attempted to contact patient regarding provider message regarding the prednisone refill - Not correct. Will inform patient that that was just a short term Rx. Unable to leave VM as Mailbox is full. Will try again later.

## 2022-08-17 ENCOUNTER — OFFICE VISIT (OUTPATIENT)
Dept: ORTHOPEDIC SURGERY | Age: 62
End: 2022-08-17
Payer: MEDICARE

## 2022-08-17 ENCOUNTER — TELEPHONE (OUTPATIENT)
Dept: ADMINISTRATIVE | Age: 62
End: 2022-08-17

## 2022-08-17 VITALS — HEIGHT: 63 IN | WEIGHT: 120 LBS | BODY MASS INDEX: 21.26 KG/M2 | TEMPERATURE: 98 F

## 2022-08-17 DIAGNOSIS — M19.012 OSTEOARTHRITIS OF GLENOHUMERAL JOINT, LEFT: ICD-10-CM

## 2022-08-17 DIAGNOSIS — M75.42 IMPINGEMENT SYNDROME OF LEFT SHOULDER: ICD-10-CM

## 2022-08-17 DIAGNOSIS — M54.12 CERVICAL RADICULOPATHY: Primary | ICD-10-CM

## 2022-08-17 DIAGNOSIS — Z77.22 TOBACCO SMOKE EXPOSURE: ICD-10-CM

## 2022-08-17 DIAGNOSIS — Z71.82 EXERCISE COUNSELING: ICD-10-CM

## 2022-08-17 DIAGNOSIS — M54.12 RADICULOPATHY OF CERVICAL SPINE: Primary | ICD-10-CM

## 2022-08-17 DIAGNOSIS — S46.212A RUPTURE OF LEFT BICEPS TENDON, INITIAL ENCOUNTER: ICD-10-CM

## 2022-08-17 PROCEDURE — 99214 OFFICE O/P EST MOD 30 MIN: CPT | Performed by: ORTHOPAEDIC SURGERY

## 2022-08-17 NOTE — TELEPHONE ENCOUNTER
Called and spoke with the patient and she is having her MRI tomorrow at [facility]. Patient is aware and voiced understanding.

## 2022-08-17 NOTE — TELEPHONE ENCOUNTER
Patient states she Spoke with Dr. Dick Gusman who states she needs to see  Dr. Lucio Luque as soon as possible. Patient states she has a left arm pinch nerve block and Dr. Dick Gusman should have reached out to office. Please advise for scheduling earliest appointment.

## 2022-08-17 NOTE — TELEPHONE ENCOUNTER
She has already been seen by me and can follow up as previously scheduled. She needs cervical MRI which I ordered.

## 2022-08-17 NOTE — PROGRESS NOTES
Chief Complaint   Patient presents with    Shoulder Pain     Lt shoulder pain continues. Inhibiting ROM and ADL's. HPI:    Patient is 64 y.o. female presents today for follow-up of left shoulder pain. Patient reports Left shoulder DOS 8/10/21. Shoulder was doing well but over the last couple of weeks it has been a little painful again. Still doing exercies with bands she got from PT. Previous treatments include rest, ice, heat, NSAIDs, HEP without much relief. No follow-up today, patient states that she had good relief from subacromial cortisone injection but she also states that her pain is now all over her arm and shoots down into her hand. Follows up after. ROS:    Skin: (-) rash,(-) psoriasis,(-) eczema, (-)skin cancer. Neurologic: (-)numbness, (-)tingling, (-)headaches, (-) LOC. Cardiovascular: (-) Chest pain, (-) swelling in legs/feet, (-) SOB, (-) cramping in legs/feet with walking.     All other review of systems negative except stated above or in HPI      Past Medical History:   Diagnosis Date    Arthritis     Bipolar 1 disorder (HCC)     Chronic fatigue     COPD (chronic obstructive pulmonary disease) (HCC)     Depression     Fibromyalgia     GERD (gastroesophageal reflux disease)     Migraines     Neuropathy     Short-term memory loss      Past Surgical History:   Procedure Laterality Date    CARDIOVASCULAR STRESS TEST  02/12/2021    Lexiscan stress test    CHOLECYSTECTOMY      COLONOSCOPY      ENDOSCOPY, COLON, DIAGNOSTIC      ESOPHAGEAL MOTILITY STUDY N/A 5/12/2021    ESOPHAGEAL MOTILITY/MANOMETRY STUDY performed by Mariola Humphrey DO at [facility] (CERVIX STATUS UNKNOWN)      SHOULDER ARTHROSCOPY Left 8/10/2021    LEFT SHOULDER ARTHROSCOPY, SUBACROMIAL DECOMPRESSION, DEBRIDEMENT (ARTHREX) performed by Britton Jonas DO at [facility]         Current Outpatient Medications:     pregabalin (LYRICA) 150 MG capsule, Take 1 capsule by mouth in the morning and 1 capsule before bedtime. Do all this for 30 days. , Disp: 60 capsule, Rfl: 2    OLANZapine (ZYPREXA) 7.5 MG tablet, Take 7.5 mg by mouth nightly, Disp: , Rfl:     traZODone (DESYREL) 100 MG tablet, TAKE 1 TABLET BY MOUTH AT BEDTIME, Disp: , Rfl:     ondansetron (ZOFRAN) 4 MG tablet, Take 1 tablet by mouth every 8 hours as needed for Nausea or Vomiting, Disp: 20 tablet, Rfl: 0    pantoprazole (PROTONIX) 40 MG tablet, Take 40 mg by mouth daily, Disp: , Rfl:     SYMBICORT 160-4.5 MCG/ACT AERO, Inhale 2 puffs into the lungs 2 times daily as needed, Disp: , Rfl:     diphenoxylate-atropine (LOMOTIL) 2.5-0.025 MG per tablet, TAKE 1 TO 2 TABLETS BY MOUTH EVERY 8 HOURS AS NEEDED, Disp: , Rfl:     pregabalin (LYRICA) 75 MG capsule, TAKE 1 CAPSULE BY MOUTH FOUR TIMES DAILY, Disp: , Rfl:     ibuprofen (ADVIL;MOTRIN) 800 MG tablet, Take 1 tablet by mouth every 8 hours as needed for Pain, Disp: 90 tablet, Rfl: 2  Allergies   Allergen Reactions    Erythromycin Rash     Social History     Socioeconomic History    Marital status:      Spouse name: Not on file    Number of children: Not on file    Years of education: Not on file    Highest education level: Not on file   Occupational History    Not on file   Tobacco Use    Smoking status: Every Day     Packs/day: 0.50     Years: 44.00     Pack years: 22.00     Types: Cigarettes    Smokeless tobacco: Never   Vaping Use    Vaping Use: Never used   Substance and Sexual Activity    Alcohol use: No    Drug use: No    Sexual activity: Not on file   Other Topics Concern    Not on file   Social History Narrative    Not on file     Social Determinants of Health     Financial Resource Strain: Not on file   Food Insecurity: Not on file   Transportation Needs: Not on file   Physical Activity: Not on file   Stress: Not on file   Social Connections: Not on file   Intimate Partner Violence: Not on file   Housing Stability: Not on file     No family history on file.         Physical Exam:    Temp 98 °F (36.7 °C)   Ht 5' 3\" (1.6 m)   Wt 120 lb (54.4 kg)   BMI 21.26 kg/m²     GENERAL: alert, appears stated age, cooperative, no acute distress    HEENT: Head is normocephalic, atraumatic. PERRLA. SKIN: Clean, dry, intact. There is not any cellulitis or cutaneous lesions noted in the lower extremities     PULMONARY: breathing is regular and unlabored, no acute distress     CV: The bilateral lower extremities are warm and well-perfused with brisk capillary refill. 2+ pulses LE bilateral.     ABDOMINAL: Nontender, nondistended     PSYCHIATRY: Pleasant mood, appropriate behavior, follows commands     NEURO: Sensation is intact distally with light touch with no alteration. Motor exam of the lower extremities show quadriceps, hamstrings, foot dorsiflexion and plantarflexion grossly intact 5/5. LYMPH: No lymphedema present distally in upper or lower extremity. MUSCULOSKELETAL:  Examination of the Left shoulder shows: There is not a deformity. There is not erythema. There is not soft tissue swelling. Deltoid region is not tender to palpation. AC Joint is not tender to palpation. Clavicle is not tender to palpation. Bicipital Groove is not tender to palpation. Pectoralis  is not tender to palpation. Scapula/ trapezius is  tender to palpation. Right:  ROM Full, Strength: Supraspinatus 5/5, Infraspinatus 5/5, Subscapularis 5/5  Left:  /170/60, Strength: Supraspinatus 5/5, Infraspinatus 5/5, Subscapularis 5/5  LeftShoulder:  Crepitus:  no   Tenderness:  none   Effusion:   none   Impingement: negative   Empty Can:  negative   Speed's:  negative      Apprehension:  negative   Cross Arm Sign:  negative   Sublette's:  negative       Neer's:  negative      Belly Press Test:  negative      Drop Arm Test:  negative     no change since last visit.       Imaging:  Surgical pictures and imaging reviewed with patient in detail      CT SHOULDER LEFT WO CONTRAST    Result Date: 5/17/2022  EXAMINATION: CT OF THE LEFT SHOULDER WITHOUT CONTRAST 5/16/2022 12:35 pm TECHNIQUE: CT of the left shoulder was performed without the administration of intravenous contrast.  Multiplanar reformatted images are provided for review. Automated exposure control, iterative reconstruction, and/or weight based adjustment of the mA/kV was utilized to reduce the radiation dose to as low as reasonably achievable. COMPARISON: None. HISTORY ORDERING SYSTEM PROVIDED HISTORY: Nontraumatic tear of left rotator cuff, unspecified tear extent FINDINGS: Moderate degenerative changes of the glenohumeral and acromioclavicular joints. Linear lucency in the anterior margin of the humeral head laterally is consistent with previous surgery. There also some minimal degenerative cystic changes in the humeral head and superior glenoid. No dislocation or acute fracture. Soft tissues could be better assessed with MRI or arthrogram.  Allowing for this, small joint effusion is suspected. Mild supraspinatus volume loss. No obvious retracted rotator cuff tear. Mild emphysematous changes in the visualized left lung. Prominent degenerative changes. No acute bony abnormality. Shoulder joint effusion is suspected. MRI would be useful if symptoms persist. RECOMMENDATIONS: Unavailable       Impression    Diagnostic Interpretation: This study was abnormal.       Electrodiagnosis: There is electrodiagnostic evidence of acervical radiculopathy. \" Location: left C6.   \" Nature: [ X ] Axonal   [  ] Demyelinating  [  ] Mixed axonal and demyelinating                      [  ] Sensory [ X ] Motor               [  ] Mixed sensorimotor                      Fadime.Starring  ] with active denervation       [  ] without active denervation   \" Duration: Chronic   \" Severity: moderate   \" Prognosis: Poor       Previous Study: There is not a prior study for comparison.        Follow up EMG is recommended if clinically warranted        Ketan Reid was seen today for shoulder pain. Diagnoses and all orders for this visit:    Cervical radiculopathy    Impingement syndrome of left shoulder    Osteoarthritis of glenohumeral joint, left    Rupture of left biceps tendon, initial encounter    Exercise counseling    Tobacco smoke exposure        Patient seen and examined. X-rays reviewed. Natural history and course discussed with patient. Treatment options discussed with patient in detail including risks and benefits. At this point patient has done well status post left shoulder arthroscopy. Patient does have a consider amount of existing glenohumeral arthritic change noted. Patient has done well however did not complete physical therapy due to pain of right shoulder. Patient is requesting continued chronic pain medication at this time. Patient was educated that she will be referred to pain management services at this time for treatment of chronic pain issues. Patient seen and examined. Imaging, pathology, and EMG reviewed with patient in detail. Natural history and course discussed with patient in long discussion  Treatment options discussed with patient in detail including risks and benefits. Patient instructed to follow back up with physical medicine rehabilitation since appears to have axonal involvement involving C6. Patient seen and examined. MRI reviewed with patient in detail. Natural history and course discussed with patient in long discussion  Treatment options discussed with patient in detail including risks and benefits. Patient should do well with conservative management as patient would like to avoid surgery at this time. Patient educated about the healing rates with this condition. Patient does smoke and does have secondhand smoke exposure. In this discussion of approximately 5 minutes, patient was counseled about decrease in smoking exposure regards to healing and overall good health.  Patient seems reluctant but is willing to listen to the discussion in detail. She states that she will try to cut down as much as possible and states that she will try to quit completely by the next visit. Bhupinder York DO             25 minutes was spent with patient. 50% or greater was spent counseling the patient.

## 2022-09-06 ENCOUNTER — TELEPHONE (OUTPATIENT)
Dept: PHYSICAL MEDICINE AND REHAB | Age: 62
End: 2022-09-06

## 2022-09-06 NOTE — TELEPHONE ENCOUNTER
Patient called back and she had the MRI cervical done at [facility]. Patient is scheduled on 10-4-22. Called and spoke with [facility] and Dr Rhonda Morse ordered the MRI cervical spine. Called and spoke with the patient to inform her that she needs to follow up with [facility] to follow up with them since they ordered the MRI. Patient is aware and voiced understanding.

## 2022-09-06 NOTE — TELEPHONE ENCOUNTER
Pt called to state that she had her cervical MRI, the facility gave her a disc, unsure of when she had it done, sometime in August.  Pt wants to know if she should schedule a f/u [phone]

## 2022-09-07 ENCOUNTER — APPOINTMENT (OUTPATIENT)
Dept: GENERAL RADIOLOGY | Age: 62
End: 2022-09-07
Payer: MEDICARE

## 2022-09-07 ENCOUNTER — HOSPITAL ENCOUNTER (EMERGENCY)
Age: 62
Discharge: HOME | End: 2022-09-07
Payer: MEDICARE

## 2022-09-07 VITALS
SYSTOLIC BLOOD PRESSURE: 110 MMHG | HEART RATE: 112 BPM | OXYGEN SATURATION: 95 % | DIASTOLIC BLOOD PRESSURE: 71 MMHG | RESPIRATION RATE: 18 BRPM | TEMPERATURE: 98.8 F

## 2022-09-07 DIAGNOSIS — J44.1 COPD EXACERBATION (HCC): ICD-10-CM

## 2022-09-07 DIAGNOSIS — R53.83 FATIGUE, UNSPECIFIED TYPE: Primary | ICD-10-CM

## 2022-09-07 LAB
ALBUMIN SERPL-MCNC: 4.4 G/DL (ref 3.5–5.2)
ALP BLD-CCNC: 68 U/L (ref 35–104)
ALT SERPL-CCNC: 13 U/L (ref 0–32)
ANION GAP SERPL CALCULATED.3IONS-SCNC: 9 MMOL/L (ref 7–16)
AST SERPL-CCNC: 12 U/L (ref 0–31)
BACTERIA: ABNORMAL /HPF
BASOPHILS ABSOLUTE: 0.04 E9/L (ref 0–0.2)
BASOPHILS RELATIVE PERCENT: 0.5 % (ref 0–2)
BILIRUB SERPL-MCNC: 0.4 MG/DL (ref 0–1.2)
BILIRUBIN URINE: ABNORMAL
BLOOD, URINE: ABNORMAL
BUN BLDV-MCNC: 8 MG/DL (ref 6–23)
CALCIUM SERPL-MCNC: 9.5 MG/DL (ref 8.6–10.2)
CHLORIDE BLD-SCNC: 101 MMOL/L (ref 98–107)
CLARITY: CLEAR
CO2: 27 MMOL/L (ref 22–29)
COLOR: ABNORMAL
CREAT SERPL-MCNC: 0.8 MG/DL (ref 0.5–1)
EOSINOPHILS ABSOLUTE: 0.03 E9/L (ref 0.05–0.5)
EOSINOPHILS RELATIVE PERCENT: 0.4 % (ref 0–6)
EPITHELIAL CELLS, UA: ABNORMAL /HPF
GFR AFRICAN AMERICAN: >60
GFR NON-AFRICAN AMERICAN: >60 ML/MIN/1.73
GLUCOSE BLD-MCNC: 98 MG/DL (ref 74–99)
GLUCOSE URINE: NEGATIVE MG/DL
HCT VFR BLD CALC: 48.2 % (ref 34–48)
HEMOGLOBIN: 15.5 G/DL (ref 11.5–15.5)
IMMATURE GRANULOCYTES #: 0.03 E9/L
IMMATURE GRANULOCYTES %: 0.4 % (ref 0–5)
KETONES, URINE: ABNORMAL MG/DL
LACTIC ACID: 1.7 MMOL/L (ref 0.5–2.2)
LEUKOCYTE ESTERASE, URINE: NEGATIVE
LIPASE: 17 U/L (ref 13–60)
LYMPHOCYTES ABSOLUTE: 1.87 E9/L (ref 1.5–4)
LYMPHOCYTES RELATIVE PERCENT: 24.2 % (ref 20–42)
MCH RBC QN AUTO: 29.7 PG (ref 26–35)
MCHC RBC AUTO-ENTMCNC: 32.2 % (ref 32–34.5)
MCV RBC AUTO: 92.3 FL (ref 80–99.9)
MONOCYTES ABSOLUTE: 0.68 E9/L (ref 0.1–0.95)
MONOCYTES RELATIVE PERCENT: 8.8 % (ref 2–12)
NEUTROPHILS ABSOLUTE: 5.07 E9/L (ref 1.8–7.3)
NEUTROPHILS RELATIVE PERCENT: 65.7 % (ref 43–80)
NITRITE, URINE: NEGATIVE
PDW BLD-RTO: 13.4 FL (ref 11.5–15)
PH UA: 6 (ref 5–9)
PLATELET # BLD: 223 E9/L (ref 130–450)
PMV BLD AUTO: 10.9 FL (ref 7–12)
POTASSIUM REFLEX MAGNESIUM: 4.6 MMOL/L (ref 3.5–5)
PROTEIN UA: ABNORMAL MG/DL
RBC # BLD: 5.22 E12/L (ref 3.5–5.5)
RBC UA: ABNORMAL /HPF (ref 0–2)
SARS-COV-2, NAAT: NOT DETECTED
SODIUM BLD-SCNC: 137 MMOL/L (ref 132–146)
SPECIFIC GRAVITY UA: 1.02 (ref 1–1.03)
TOTAL PROTEIN: 7.4 G/DL (ref 6.4–8.3)
TROPONIN, HIGH SENSITIVITY: 7 NG/L (ref 0–9)
TROPONIN, HIGH SENSITIVITY: 7 NG/L (ref 0–9)
UROBILINOGEN, URINE: 0.2 E.U./DL
WBC # BLD: 7.7 E9/L (ref 4.5–11.5)
WBC UA: ABNORMAL /HPF (ref 0–5)

## 2022-09-07 PROCEDURE — 84484 ASSAY OF TROPONIN QUANT: CPT

## 2022-09-07 PROCEDURE — 85025 COMPLETE CBC W/AUTO DIFF WBC: CPT

## 2022-09-07 PROCEDURE — 87635 SARS-COV-2 COVID-19 AMP PRB: CPT

## 2022-09-07 PROCEDURE — 6370000000 HC RX 637 (ALT 250 FOR IP): Performed by: PHYSICIAN ASSISTANT

## 2022-09-07 PROCEDURE — 96374 THER/PROPH/DIAG INJ IV PUSH: CPT

## 2022-09-07 PROCEDURE — 71046 X-RAY EXAM CHEST 2 VIEWS: CPT

## 2022-09-07 PROCEDURE — 93005 ELECTROCARDIOGRAM TRACING: CPT | Performed by: PHYSICIAN ASSISTANT

## 2022-09-07 PROCEDURE — 2580000003 HC RX 258: Performed by: PHYSICIAN ASSISTANT

## 2022-09-07 PROCEDURE — 83605 ASSAY OF LACTIC ACID: CPT

## 2022-09-07 PROCEDURE — 80053 COMPREHEN METABOLIC PANEL: CPT

## 2022-09-07 PROCEDURE — 6360000002 HC RX W HCPCS: Performed by: PHYSICIAN ASSISTANT

## 2022-09-07 PROCEDURE — 36415 COLL VENOUS BLD VENIPUNCTURE: CPT

## 2022-09-07 PROCEDURE — 81001 URINALYSIS AUTO W/SCOPE: CPT

## 2022-09-07 PROCEDURE — 83690 ASSAY OF LIPASE: CPT

## 2022-09-07 PROCEDURE — 99285 EMERGENCY DEPT VISIT HI MDM: CPT

## 2022-09-07 RX ORDER — ONDANSETRON 2 MG/ML
4 INJECTION INTRAMUSCULAR; INTRAVENOUS ONCE
Status: COMPLETED | OUTPATIENT
Start: 2022-09-07 | End: 2022-09-07

## 2022-09-07 RX ORDER — BENZONATATE 100 MG/1
100 CAPSULE ORAL 3 TIMES DAILY PRN
Qty: 21 CAPSULE | Refills: 0 | Status: SHIPPED | OUTPATIENT
Start: 2022-09-07 | End: 2022-09-14

## 2022-09-07 RX ORDER — DOXYCYCLINE HYCLATE 100 MG/1
100 CAPSULE ORAL ONCE
Status: COMPLETED | OUTPATIENT
Start: 2022-09-07 | End: 2022-09-07

## 2022-09-07 RX ORDER — PREDNISONE 10 MG/1
40 TABLET ORAL DAILY
Qty: 20 TABLET | Refills: 0 | Status: SHIPPED | OUTPATIENT
Start: 2022-09-07 | End: 2022-09-12

## 2022-09-07 RX ORDER — ALBUTEROL SULFATE 90 UG/1
2 AEROSOL, METERED RESPIRATORY (INHALATION) 4 TIMES DAILY PRN
Qty: 18 G | Refills: 0 | Status: SHIPPED | OUTPATIENT
Start: 2022-09-07

## 2022-09-07 RX ORDER — 0.9 % SODIUM CHLORIDE 0.9 %
1000 INTRAVENOUS SOLUTION INTRAVENOUS ONCE
Status: COMPLETED | OUTPATIENT
Start: 2022-09-07 | End: 2022-09-07

## 2022-09-07 RX ORDER — PREDNISONE 20 MG/1
60 TABLET ORAL ONCE
Status: COMPLETED | OUTPATIENT
Start: 2022-09-07 | End: 2022-09-07

## 2022-09-07 RX ORDER — DOXYCYCLINE HYCLATE 100 MG
100 TABLET ORAL 2 TIMES DAILY
Qty: 20 TABLET | Refills: 0 | Status: SHIPPED | OUTPATIENT
Start: 2022-09-07 | End: 2022-09-17

## 2022-09-07 RX ORDER — ONDANSETRON 4 MG/1
4 TABLET, ORALLY DISINTEGRATING ORAL EVERY 8 HOURS PRN
Qty: 10 TABLET | Refills: 0 | Status: SHIPPED | OUTPATIENT
Start: 2022-09-07

## 2022-09-07 RX ADMIN — DOXYCYCLINE HYCLATE 100 MG: 100 CAPSULE ORAL at 17:07

## 2022-09-07 RX ADMIN — SODIUM CHLORIDE 1000 ML: 9 INJECTION, SOLUTION INTRAVENOUS at 14:59

## 2022-09-07 RX ADMIN — PREDNISONE 60 MG: 20 TABLET ORAL at 17:07

## 2022-09-07 RX ADMIN — ONDANSETRON 4 MG: 2 INJECTION INTRAMUSCULAR; INTRAVENOUS at 15:00

## 2022-09-07 NOTE — ED PROVIDER NOTES
Independent MLP   HPI:  9/7/22, Time: 2:29 PM EDT         Cheryle Pilgrim is a 64 y.o. female presenting to the ED for fatigue, cough shortness of breath, beginning 1 Day  ago. The complaint has been persistent, moderate in severity, and worsened by cough. patient comes in with complaint of generalized fatigue, body aches. She has some chest discomfort with coughing only. Shortness of breath is chronic associated with her COPD. Denies any palpitations diaphoresis. She has had nausea denies any vomiting with frequent watery diarrhea. She denies any abdominal pain. No urinary frequency urgency or burning. She feels like she may have COVID. She has had COVID twice previously which feels similar. Review of Systems:   A complete review of systems was performed and pertinent positives and negatives are stated within HPI, all other systems reviewed and are negative.          --------------------------------------------- PAST HISTORY ---------------------------------------------  Past Medical History:  has a past medical history of Arthritis, Bipolar 1 disorder ([facility] [address] Chronic fatigue, [facility]ch[facility] disease) [facility] Depression, Fibromyalgia, GERD (gastroesophageal reflux disease), Migraines, Neuropathy, and Short-term memory loss. Past Surgical History:  has a past surgical history that includes Hysterectomy; Cholecystectomy; sinus surgery; Colonoscopy; Endoscopy, colon, diagnostic; cardiovascular stress test (02/12/2021); esophageal motility study (N/A, 5/12/2021); and Shoulder arthroscopy (Left, 8/10/2021). Social History:  reports that she has been smoking cigarettes. She has a 44.00 pack-year smoking history. She has never used smokeless tobacco. She reports that she does not drink alcohol and does not use drugs. Family History: family history is not on file. The patients home medications have been reviewed.     Allergies: Erythromycin    -------------------------------------------------- RESULTS -------------------------------------------------  All laboratory and radiology results have been personally reviewed by myself   LABS:  Results for orders placed or performed during the hospital encounter of 09/07/22   COVID-19, Rapid    Specimen: Nasopharyngeal Swab   Result Value Ref Range    SARS-CoV-2, NAAT Not Detected Not Detected   CBC with Auto Differential   Result Value Ref Range    WBC 7.7 4.5 - 11.5 E9/L    RBC 5.22 3.50 - 5.50 E12/L    Hemoglobin 15.5 11.5 - 15.5 g/dL    Hematocrit 48.2 (H) 34.0 - 48.0 %    MCV 92.3 80.0 - 99.9 fL    MCH 29.7 26.0 - 35.0 pg    MCHC 32.2 32.0 - 34.5 %    RDW 13.4 11.5 - 15.0 fL    Platelets 146 973 - 458 E9/L    MPV 10.9 7.0 - 12.0 fL    Neutrophils % 65.7 43.0 - 80.0 %    Immature Granulocytes % 0.4 0.0 - 5.0 %    Lymphocytes % 24.2 20.0 - 42.0 %    Monocytes % 8.8 2.0 - 12.0 %    Eosinophils % 0.4 0.0 - 6.0 %    Basophils % 0.5 0.0 - 2.0 %    Neutrophils Absolute 5.07 1.80 - 7.30 E9/L    Immature Granulocytes # 0.03 E9/L    Lymphocytes Absolute 1.87 1.50 - 4.00 E9/L    Monocytes Absolute 0.68 0.10 - 0.95 E9/L    Eosinophils Absolute 0.03 (L) 0.05 - 0.50 E9/L    Basophils Absolute 0.04 0.00 - 0.20 E9/L   Comprehensive Metabolic Panel w/ Reflex to MG   Result Value Ref Range    Sodium 137 132 - 146 mmol/L    Potassium reflex Magnesium 4.6 3.5 - 5.0 mmol/L    Chloride 101 98 - 107 mmol/L    CO2 27 22 - 29 mmol/L    Anion Gap 9 7 - 16 mmol/L    Glucose 98 74 - 99 mg/dL    BUN 8 6 - 23 mg/dL    Creatinine 0.8 0.5 - 1.0 mg/dL    GFR Non-African American >60 >=60 mL/min/1.73    GFR African American >60     Calcium 9.5 8.6 - 10.2 mg/dL    Total Protein 7.4 6.4 - 8.3 g/dL    Albumin 4.4 3.5 - 5.2 g/dL    Total Bilirubin 0.4 0.0 - 1.2 mg/dL    Alkaline Phosphatase 68 35 - 104 U/L    ALT 13 0 - 32 U/L    AST 12 0 - 31 U/L   Troponin   Result Value Ref Range    Troponin, High Sensitivity 7 0 - 9 ng/L Lipase   Result Value Ref Range    Lipase 17 13 - 60 U/L   Lactic Acid   Result Value Ref Range    Lactic Acid 1.7 0.5 - 2.2 mmol/L   Urinalysis with Microscopic   Result Value Ref Range    Color, UA DKYELLOW Straw/Yellow    Clarity, UA Clear Clear    Glucose, Ur Negative Negative mg/dL    Bilirubin Urine SMALL (A) Negative    Ketones, Urine TRACE (A) Negative mg/dL    Specific Gravity, UA 1.025 1.005 - 1.030    Blood, Urine SMALL (A) Negative    pH, UA 6.0 5.0 - 9.0    Protein, UA TRACE Negative mg/dL    Urobilinogen, Urine 0.2 <2.0 E.U./dL    Nitrite, Urine Negative Negative    Leukocyte Esterase, Urine Negative Negative    WBC, UA 0-1 0 - 5 /HPF    RBC, UA 0-1 0 - 2 /HPF    Epithelial Cells, UA FEW /HPF    Bacteria, UA NONE SEEN None Seen /HPF   Troponin   Result Value Ref Range    Troponin, High Sensitivity 7 0 - 9 ng/L   EKG 12 Lead   Result Value Ref Range    Ventricular Rate 95 BPM    Atrial Rate 95 BPM    P-R Interval 130 ms    QRS Duration 68 ms    Q-T Interval 338 ms    QTc Calculation (Bazett) 424 ms    P Axis 72 degrees    T Axis 64 degrees       RADIOLOGY:  Interpreted by Radiologist.  XR CHEST (2 VW)   Final Result   No acute process. ------------------------- NURSING NOTES AND VITALS REVIEWED ---------------------------   The nursing notes within the ED encounter and vital signs as below have been reviewed. /71   Pulse (!) 112   Temp 98.8 °F (37.1 °C)   Resp 18   SpO2 95%   Oxygen Saturation Interpretation: Normal repeat heart rate 85      ---------------------------------------------------PHYSICAL EXAM--------------------------------------      Constitutional/General: Alert and oriented x3,  Head: Normocephalic and atraumatic  Eyes: PERRL, EOMI  Mouth: Oropharynx clear, handling secretions, no trismus  Neck: Supple, full ROM,   Pulmonary: Lungs clear to auscultation bilaterally, no wheezes, rales, or rhonchi.  Not in respiratory distress  Cardiovascular:  Regular rate and rhythm, no murmurs, gallops, or rubs. 2+ distal pulses  Abdomen: Soft, non tender, non distended, no guarding or rebound present. Extremities: Moves all extremities x 4. Warm and well perfused  Skin: warm and dry without rash  Neurologic: GCS 15,  Psych: Normal Affect      ------------------------------ ED COURSE/MEDICAL DECISION MAKING----------------------  Medications   doxycycline hyclate (VIBRAMYCIN) capsule 100 mg (has no administration in time range)   predniSONE (DELTASONE) tablet 60 mg (has no administration in time range)   0.9 % sodium chloride bolus (1,000 mLs IntraVENous New Bag 9/7/22 1459)   ondansetron (ZOFRAN) injection 4 mg (4 mg IntraVENous Given 9/7/22 1500)         ED COURSE:     EKG #1:  Interpreted by emergency department attending physician unless otherwise noted. 9/7/22  Time: 1442    Rhythm: normal sinus   Rate: 95  Axis: normal  Conduction: normal  ST Segments: normal  T Waves: inversion in  v2    Clinical Impression: NSR, Normal ecg  Comparison to Prior tracings: There are no significant changes when compared to prior tracings. 1615 patient is feeling better nausea has improved. Will do orthostatic vitals on the patient. She was updated on lab findings EKG. Medical Decision Making:   Patient comes in with complaint of generalized fatigue with a moist cough chest discomfort with coughing only short chronic shortness of breath and. Feeling lightheaded dizzy which improved after she received IV fluids. EKG no acute findings troponin was 7 unchanged on delta troponin. She had concern for COVID which was negative. Will treat patient for COPD exacerbation and placed her on an Doxy Tessalon Perles prednisone burst and Zofran for her nausea. Counseling: The emergency provider has spoken with the patient and discussed todays results, in addition to providing specific details for the plan of care and counseling regarding the diagnosis and prognosis.   Questions are answered at this time and they are agreeable with the plan.      --------------------------------- IMPRESSION AND DISPOSITION ---------------------------------    IMPRESSION  1. Fatigue, unspecified type    2. COPD exacerbation ([facility] [address]        DISPOSITION  Disposition: Discharge to home  Patient condition is good      NOTE: This report was transcribed using voice recognition software.  Every effort was made to ensure accuracy; however, inadvertent computerized transcription errors may be present      Cole Copeland [place]  09/07/22 1799

## 2022-09-10 LAB
EKG ATRIAL RATE: 95 BPM
EKG P AXIS: 72 DEGREES
EKG P-R INTERVAL: 130 MS
EKG Q-T INTERVAL: 338 MS
EKG QRS DURATION: 68 MS
EKG QTC CALCULATION (BAZETT): 424 MS
EKG T AXIS: 64 DEGREES
EKG VENTRICULAR RATE: 95 BPM

## 2022-10-06 ENCOUNTER — OFFICE VISIT (OUTPATIENT)
Dept: ORTHOPEDIC SURGERY | Age: 62
End: 2022-10-06
Payer: MEDICARE

## 2022-10-06 VITALS — WEIGHT: 120 LBS | BODY MASS INDEX: 21.26 KG/M2 | HEIGHT: 63 IN

## 2022-10-06 DIAGNOSIS — S46.212A RUPTURE OF LEFT BICEPS TENDON, INITIAL ENCOUNTER: ICD-10-CM

## 2022-10-06 DIAGNOSIS — M54.12 CERVICAL RADICULOPATHY: Primary | ICD-10-CM

## 2022-10-06 DIAGNOSIS — M75.42 IMPINGEMENT SYNDROME OF LEFT SHOULDER: ICD-10-CM

## 2022-10-06 DIAGNOSIS — M19.012 OSTEOARTHRITIS OF GLENOHUMERAL JOINT, LEFT: ICD-10-CM

## 2022-10-06 PROCEDURE — 99214 OFFICE O/P EST MOD 30 MIN: CPT | Performed by: ORTHOPAEDIC SURGERY

## 2022-10-06 NOTE — PROGRESS NOTES
Chief Complaint   Patient presents with    Shoulder Pain     Left shoulder follow up. She reports she cannot get epidurals from pain clinic due to problem with authorization. States she would like surgery on her shoulder. HPI:    Patient is 64 y.o. female presents today for follow-up of left shoulder pain. Patient reports Left shoulder DOS 8/10/21. Shoulder was doing well but over the last couple of weeks it has been a little painful again. Still doing exercies with bands she got from PT. Previous treatments include rest, ice, heat, NSAIDs, HEP without much relief. No follow-up today, patient states that she had good relief from subacromial cortisone injection but she also states that her pain is now all over her arm and shoots down into her hand. Follows up after. ROS:    Skin: (-) rash,(-) psoriasis,(-) eczema, (-)skin cancer. Neurologic: (-)numbness, (-)tingling, (-)headaches, (-) LOC. Cardiovascular: (-) Chest pain, (-) swelling in legs/feet, (-) SOB, (-) cramping in legs/feet with walking.     All other review of systems negative except stated above or in HPI      Past Medical History:   Diagnosis Date    Arthritis     Bipolar 1 disorder (HCC)     Chronic fatigue     COPD (chronic obstructive pulmonary disease) (HCC)     Depression     Fibromyalgia     GERD (gastroesophageal reflux disease)     Migraines     Neuropathy     Short-term memory loss      Past Surgical History:   Procedure Laterality Date    CARDIOVASCULAR STRESS TEST  02/12/2021    Lexiscan stress test    CHOLECYSTECTOMY      COLONOSCOPY      ENDOSCOPY, COLON, DIAGNOSTIC      ESOPHAGEAL MOTILITY STUDY N/A 5/12/2021    ESOPHAGEAL MOTILITY/MANOMETRY STUDY performed by Cheryl Ko DO at [facility] (CERVIX STATUS UNKNOWN)      SHOULDER ARTHROSCOPY Left 8/10/2021    LEFT SHOULDER ARTHROSCOPY, SUBACROMIAL DECOMPRESSION, DEBRIDEMENT (ARTHREX) performed by Linda Vargas DO at [facility] Current Outpatient Medications:     albuterol sulfate HFA (VENTOLIN HFA) 108 (90 Base) MCG/ACT inhaler, Inhale 2 puffs into the lungs 4 times daily as needed for Wheezing, Disp: 18 g, Rfl: 0    ondansetron (ZOFRAN ODT) 4 MG disintegrating tablet, Take 1 tablet by mouth every 8 hours as needed for Nausea or Vomiting, Disp: 10 tablet, Rfl: 0    pregabalin (LYRICA) 150 MG capsule, Take 1 capsule by mouth in the morning and 1 capsule before bedtime. Do all this for 30 days. , Disp: 60 capsule, Rfl: 2    OLANZapine (ZYPREXA) 7.5 MG tablet, Take 7.5 mg by mouth nightly, Disp: , Rfl:     ibuprofen (ADVIL;MOTRIN) 800 MG tablet, Take 1 tablet by mouth every 8 hours as needed for Pain, Disp: 90 tablet, Rfl: 2    traZODone (DESYREL) 100 MG tablet, TAKE 1 TABLET BY MOUTH AT BEDTIME, Disp: , Rfl:     pantoprazole (PROTONIX) 40 MG tablet, Take 40 mg by mouth daily, Disp: , Rfl:     SYMBICORT 160-4.5 MCG/ACT AERO, Inhale 2 puffs into the lungs 2 times daily as needed, Disp: , Rfl:     diphenoxylate-atropine (LOMOTIL) 2.5-0.025 MG per tablet, TAKE 1 TO 2 TABLETS BY MOUTH EVERY 8 HOURS AS NEEDED, Disp: , Rfl:     pregabalin (LYRICA) 75 MG capsule, TAKE 1 CAPSULE BY MOUTH FOUR TIMES DAILY, Disp: , Rfl:   Allergies   Allergen Reactions    Erythromycin Rash     Social History     Socioeconomic History    Marital status:      Spouse name: Not on file    Number of children: Not on file    Years of education: Not on file    Highest education level: Not on file   Occupational History    Not on file   Tobacco Use    Smoking status: Every Day     Packs/day: 1.00     Years: 44.00     Pack years: 44.00     Types: Cigarettes    Smokeless tobacco: Never   Vaping Use    Vaping Use: Never used   Substance and Sexual Activity    Alcohol use: No    Drug use: No    Sexual activity: Not on file   Other Topics Concern    Not on file   Social History Narrative    Not on file     Social Determinants of Health     Financial Resource Strain: Not on file   Food Insecurity: Not on file   Transportation Needs: Not on file   Physical Activity: Not on file   Stress: Not on file   Social Connections: Not on file   Intimate Partner Violence: Not on file   Housing Stability: Not on file     No family history on file. Physical Exam:    Ht 5' 3\" (1.6 m)   Wt 120 lb (54.4 kg)   BMI 21.26 kg/m²     GENERAL: alert, appears stated age, cooperative, no acute distress    HEENT: Head is normocephalic, atraumatic. PERRLA. SKIN: Clean, dry, intact. There is not any cellulitis or cutaneous lesions noted in the lower extremities     PULMONARY: breathing is regular and unlabored, no acute distress     CV: The bilateral lower extremities are warm and well-perfused with brisk capillary refill. 2+ pulses LE bilateral.     ABDOMINAL: Nontender, nondistended     PSYCHIATRY: Pleasant mood, appropriate behavior, follows commands     NEURO: Sensation is intact distally with light touch with no alteration. Motor exam of the lower extremities show quadriceps, hamstrings, foot dorsiflexion and plantarflexion grossly intact 5/5. LYMPH: No lymphedema present distally in upper or lower extremity. MUSCULOSKELETAL:  Examination of the Left shoulder shows: There is not a deformity. There is not erythema. There is not soft tissue swelling. Deltoid region is not tender to palpation. AC Joint is not tender to palpation. Clavicle is not tender to palpation. Bicipital Groove is not tender to palpation. Pectoralis  is not tender to palpation. Scapula/ trapezius is  tender to palpation.   Right:  ROM Full, Strength: Supraspinatus 5/5, Infraspinatus 5/5, Subscapularis 5/5  Left:  /170/60, Strength: Supraspinatus 5/5, Infraspinatus 5/5, Subscapularis 5/5  LeftShoulder:  Crepitus:  no   Tenderness:  none   Effusion:   none   Impingement: negative   Empty Can:  negative   Speed's:  negative      Apprehension:  negative   Cross Arm Sign:  negative   Burnet's: negative       Neer's:  negative      Belly Press Test:  negative      Drop Arm Test:  negative     no change since last visit. Imaging:  Surgical pictures and imaging reviewed with patient in detail      CT SHOULDER LEFT WO CONTRAST    Result Date: 5/17/2022  EXAMINATION: CT OF THE LEFT SHOULDER WITHOUT CONTRAST 5/16/2022 12:35 pm TECHNIQUE: CT of the left shoulder was performed without the administration of intravenous contrast.  Multiplanar reformatted images are provided for review. Automated exposure control, iterative reconstruction, and/or weight based adjustment of the mA/kV was utilized to reduce the radiation dose to as low as reasonably achievable. COMPARISON: None. HISTORY ORDERING SYSTEM PROVIDED HISTORY: Nontraumatic tear of left rotator cuff, unspecified tear extent FINDINGS: Moderate degenerative changes of the glenohumeral and acromioclavicular joints. Linear lucency in the anterior margin of the humeral head laterally is consistent with previous surgery. There also some minimal degenerative cystic changes in the humeral head and superior glenoid. No dislocation or acute fracture. Soft tissues could be better assessed with MRI or arthrogram.  Allowing for this, small joint effusion is suspected. Mild supraspinatus volume loss. No obvious retracted rotator cuff tear. Mild emphysematous changes in the visualized left lung. Prominent degenerative changes. No acute bony abnormality. Shoulder joint effusion is suspected. MRI would be useful if symptoms persist. RECOMMENDATIONS: Unavailable       Impression    Diagnostic Interpretation: This study was abnormal.       Electrodiagnosis: There is electrodiagnostic evidence of acervical radiculopathy.    \" Location: left C6.   \" Nature: [ X ] Axonal   [  ] Demyelinating  [  ] Mixed axonal and demyelinating                      [  ] Sensory [ X ] Motor               [  ] Mixed sensorimotor                      Jose.[email or web address]  ] with active denervation [  ] without active denervation   \" Duration: Chronic   \" Severity: moderate   \" Prognosis: Poor       Previous Study: There is not a prior study for comparison. Follow up EMG is recommended if clinically warranted        Whitney Farley was seen today for shoulder pain. Diagnoses and all orders for this visit:    Cervical radiculopathy  -     Ambulatory referral to Neurosurgery    Impingement syndrome of left shoulder    Osteoarthritis of glenohumeral joint, left    Rupture of left biceps tendon, initial encounter          Patient seen and examined. Imaging reviewed. Natural history and course discussed with patient along with chart in detail. Treatment options discussed with patient in detail including risks and benefits. At this point patient has done well status post left shoulder arthroscopy. Patient does have a consider amount of existing glenohumeral arthritic change noted. Patient has done well however did not complete physical therapy due to pain of right shoulder. Patient is requesting continued chronic pain medication at this time. Patient was educated that she will be referred to pain management services at this time for treatment of chronic pain issues. Patient seen and examined. Imaging, pathology, and EMG reviewed with patient in detail. Natural history and course discussed with patient in long discussion  Treatment options discussed with patient in detail including risks and benefits. Patient instructed to follow back up with physical medicine rehabilitation since appears to have axonal involvement involving C6. Patient seen and examined. MRI reviewed with patient in detail. Natural history and course discussed with patient in long discussion  Treatment options discussed with patient in detail including risks and benefits. Patient should do well with conservative management as patient would like to avoid surgery at this time.    Patient educated about the healing rates with this condition. Patient does smoke and does have secondhand smoke exposure. In this discussion of approximately 5 minutes, patient was counseled about decrease in smoking exposure regards to healing and overall good health. Patient seems reluctant but is willing to listen to the discussion in detail. She states that she will try to cut down as much as possible and states that she will try to quit completely by the next visit. Alison Marshall, DO             25 minutes was spent with patient. 50% or greater was spent counseling the patient.

## 2022-10-10 ENCOUNTER — TELEPHONE (OUTPATIENT)
Dept: ADMINISTRATIVE | Age: 62
End: 2022-10-10

## 2022-10-10 NOTE — TELEPHONE ENCOUNTER
Called and spoke to pt and let her know that she has a referral to see Neurosurgery anyone in the group or

## 2022-10-10 NOTE — TELEPHONE ENCOUNTER
Pt would like to speak to the office about what specific physician she is to see for her left shoulder. Please contact pt.

## 2022-10-31 ENCOUNTER — TELEPHONE (OUTPATIENT)
Dept: ADMINISTRATIVE | Age: 62
End: 2022-10-31

## 2022-10-31 NOTE — TELEPHONE ENCOUNTER
Patient requesting refill on Hydrocodone    Pharmacy: [facility] on [id number] [facility] [address], [place]

## 2022-11-01 ENCOUNTER — INITIAL CONSULT (OUTPATIENT)
Dept: NEUROSURGERY | Age: 62
End: 2022-11-01
Payer: MEDICARE

## 2022-11-01 VITALS
TEMPERATURE: 96.6 F | SYSTOLIC BLOOD PRESSURE: 97 MMHG | WEIGHT: 120 LBS | BODY MASS INDEX: 21.26 KG/M2 | RESPIRATION RATE: 20 BRPM | HEART RATE: 83 BPM | HEIGHT: 63 IN | DIASTOLIC BLOOD PRESSURE: 65 MMHG | OXYGEN SATURATION: 93 %

## 2022-11-01 DIAGNOSIS — M54.2 CERVICALGIA: Primary | ICD-10-CM

## 2022-11-01 DIAGNOSIS — M54.10 RADICULOPATHY, UNSPECIFIED SPINAL REGION: ICD-10-CM

## 2022-11-01 PROCEDURE — 99202 OFFICE O/P NEW SF 15 MIN: CPT

## 2022-11-01 PROCEDURE — 99204 OFFICE O/P NEW MOD 45 MIN: CPT | Performed by: NEUROLOGICAL SURGERY

## 2022-11-02 NOTE — PROGRESS NOTES
[address] NEUROSURGERY  [facility] [address] [phone]       Chief Complaint:   Chief Complaint   Patient presents with    Neck Pain     Neck pain is constant, emg and mri done       HPI:     I had the pleasure of seeing Estil Sprain today in neurosurgical clinic. 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 States that the pain is shooting in quality and has been present for at least a year now. Her clinical picture is located by rupture of the left biceps tendon, osteoarthritis of the glenohumeral joint and impingement syndrome of her left shoulder. This is being managed by orthopedics. She denies lexie trauma to the neck. There is no loss of bowel or bladder function. [facility] but there was a problem with authorization and therefore was unable to obtain an epidural steroid injection. She is seeking second opinion for this.     Past Medical History:   Diagnosis Date    Arthritis     Bipolar 1 disorder ([address]     Chronic fatigue     COPD (chronic obstructive pulmonary disease) ([facility])     Depression     Fibromyalgia     GERD (gastroesophageal reflux disease)     Migraines     Neuropathy     Short-term memory loss      Past Surgical History:   Procedure Laterality Date    CARDIOVASCULAR STRESS TEST  02/12/2021    Lexiscan stress test    CHOLECYSTECTOMY      COLONOSCOPY      ENDOSCOPY, COLON, DIAGNOSTIC      ESOPHAGEAL MOTILITY STUDY N/A 5/12/2021    ESOPHAGEAL MOTILITY/MANOMETRY STUDY performed by Charli Haider DO at [facility] (CERVIX STATUS UNKNOWN)      SHOULDER ARTHROSCOPY Left 8/10/2021    LEFT SHOULDER ARTHROSCOPY, SUBACROMIAL DECOMPRESSION, DEBRIDEMENT (ARTHREX) performed by Kd Tucker DO at [facility]        History reviewed. No pertinent family history.    Social History     Socioeconomic History    Marital status:      Spouse name: Not on file    Number of children: Not on file    Years of education: Not on file    Highest education level: Not on file   Occupational History    Not on file   Tobacco Use    Smoking status: Every Day     Packs/day: 1.00     Years: 44.00     Pack years: 44.00     Types: Cigarettes    Smokeless tobacco: Never   Vaping Use    Vaping Use: Never used   Substance and Sexual Activity    Alcohol use: No    Drug use: No    Sexual activity: Not on file   Other Topics Concern    Not on file   Social History Narrative    Not on file     Social Determinants of Health     Financial Resource Strain: Not on file   Food Insecurity: Not on file   Transportation Needs: Not on file   Physical Activity: Not on file   Stress: Not on file   Social Connections: Not on file   Intimate Partner Violence: Not on file   Housing Stability: Not on file       Medications:   Current Outpatient Medications   Medication Sig Dispense Refill    albuterol sulfate HFA (VENTOLIN HFA) 108 (90 Base) MCG/ACT inhaler Inhale 2 puffs into the lungs 4 times daily as needed for Wheezing 18 g 0    ondansetron (ZOFRAN ODT) 4 MG disintegrating tablet Take 1 tablet by mouth every 8 hours as needed for Nausea or Vomiting 10 tablet 0    OLANZapine (ZYPREXA) 7.5 MG tablet Take 7.5 mg by mouth nightly      traZODone (DESYREL) 100 MG tablet TAKE 1 TABLET BY MOUTH AT BEDTIME      pantoprazole (PROTONIX) 40 MG tablet Take 40 mg by mouth daily      SYMBICORT 160-4.5 MCG/ACT AERO Inhale 2 puffs into the lungs 2 times daily as needed      diphenoxylate-atropine (LOMOTIL) 2.5-0.025 MG per tablet TAKE 1 TO 2 TABLETS BY MOUTH EVERY 8 HOURS AS NEEDED      pregabalin (LYRICA) 75 MG capsule TAKE 1 CAPSULE BY MOUTH FOUR TIMES DAILY      pregabalin (LYRICA) 150 MG capsule Take 1 capsule by mouth in the morning and 1 capsule before bedtime. Do all this for 30 days. 60 capsule 2    ibuprofen (ADVIL;MOTRIN) 800 MG tablet Take 1 tablet by mouth every 8 hours as needed for Pain 90 tablet 2     No current facility-administered medications for this visit. Allergies:    Erythromycin       Review of Systems:    Denies any chest pain, headache, dyspnea, recent weight loss, fevers, chills or night sweats. Physical Examination:    BP 97/65   Pulse 83   Temp (!) 96.6 °F (35.9 °C)   Resp 20   Ht 5' 3\" (1.6 m)   Wt 120 lb (54.4 kg)   SpO2 93%   BMI 21.26 kg/m²      On focused neurological examination, she  is awake alert oriented and rationally conversant. Speech is clear and fluent. Pupils are equal and reactive to light bilaterally, extraocular movements are intact, visual fields are full to confrontation. Her  face is symmetric and grossly intact to fine touch. Uvula and tongue are both midline. Shoulder shrug is symmetric and strong. Cervical spine is well aligned tender to direct palpation. She  can forward flex, laterally rotate and laterally extend without limitation or pain. Spurling sign is negative. Motor examination reveals preserved power in the upper and lower extremities at 5 out of 5 throughout. Reflexes are symmetric and brisk. Plantar responses are downgoing. There is no clonus. Patient is intact to fine touch in all dermatomes throughout. Gait and station are normal.    ASSESSMENT:    I personally reviewed Russ Quiroz's radiographic images, particularly her MRI from [facility] dated 3 September 2022 which demonstrates multilevel degenerative changes most pronounced at C6-7 with broad disc osteophyte complex and foraminal stenosis. 1. Cervicalgia  -     EMG; Future  -     XR CERVICAL SPINE FLEXION AND EXTENSION; Future  -     [facility], R Lobito Lopez MD, Pain Medicine, [place]  2.  Radiculopathy, unspecified spinal region  -     EMG; Future  -     XR CERVICAL SPINE FLEXION AND EXTENSION; Future  -     [facility], R Kristisa Olga Lopez MD, Pain Medicine, [address]:    Showed the patient her images and explained the findings. We have offered a referral for physical therapy as well as pain management within the [address] [place] [address]. Would like to repeat her EMG with nerve conduction studies and obtain flexion-extension x-rays to ensure that occult listhesis is not contributing to this woman's complaints. Should surgical intervention be considered she would need to abstain from nicotine use for at least 6 weeks. All questions were answered and the patient is happy with the plan at hand. Thank you so much for allowing us to participate in the care of this patient. Return in about 6 weeks (around 12/13/2022) for discuss results, needs tests completed prior to appt. Electronically signed by Candace Valencia MD on 11/2/2022 at 8:31 AM       NOTE: This report was transcribed using voice recognition software.  Every effort was made to ensure accuracy; however, inadvertent computerized transcription errors may be present

## 2022-11-07 ENCOUNTER — TELEPHONE (OUTPATIENT)
Dept: PHYSICAL THERAPY | Age: 62
End: 2022-11-07

## 2022-11-07 PROBLEM — M54.2 CERVICALGIA: Status: ACTIVE | Noted: 2022-11-07

## 2022-11-07 PROBLEM — M54.10 RADICULOPATHY: Status: ACTIVE | Noted: 2022-11-07

## 2022-11-07 NOTE — TELEPHONE ENCOUNTER
Date: 2022       Patient Name: Alban Neal  : 1960      MRN: [id number]    No show/no call for PT evaluation scheduled at [id number] today.     Iban Aaron, PT

## 2023-01-07 ENCOUNTER — HOSPITAL ENCOUNTER (EMERGENCY)
Age: 63
Discharge: HOME | End: 2023-01-07
Payer: MEDICARE

## 2023-01-07 ENCOUNTER — APPOINTMENT (OUTPATIENT)
Dept: GENERAL RADIOLOGY | Age: 63
End: 2023-01-07
Payer: MEDICARE

## 2023-01-07 VITALS
BODY MASS INDEX: 21.27 KG/M2 | HEART RATE: 76 BPM | WEIGHT: 122 LBS | OXYGEN SATURATION: 96 % | TEMPERATURE: 98.3 F | DIASTOLIC BLOOD PRESSURE: 61 MMHG | RESPIRATION RATE: 18 BRPM | SYSTOLIC BLOOD PRESSURE: 126 MMHG

## 2023-01-07 DIAGNOSIS — J44.1 COPD EXACERBATION (HCC): Primary | ICD-10-CM

## 2023-01-07 DIAGNOSIS — M54.2 CHRONIC NECK PAIN: ICD-10-CM

## 2023-01-07 DIAGNOSIS — G89.29 CHRONIC NECK PAIN: ICD-10-CM

## 2023-01-07 LAB
INFLUENZA A BY PCR: NOT DETECTED
INFLUENZA B BY PCR: NOT DETECTED
SARS-COV-2, NAAT: NOT DETECTED

## 2023-01-07 PROCEDURE — 99285 EMERGENCY DEPT VISIT HI MDM: CPT

## 2023-01-07 PROCEDURE — 94664 DEMO&/EVAL PT USE INHALER: CPT

## 2023-01-07 PROCEDURE — 87502 INFLUENZA DNA AMP PROBE: CPT

## 2023-01-07 PROCEDURE — 87635 SARS-COV-2 COVID-19 AMP PRB: CPT

## 2023-01-07 PROCEDURE — 6370000000 HC RX 637 (ALT 250 FOR IP): Performed by: PHYSICIAN ASSISTANT

## 2023-01-07 PROCEDURE — 93005 ELECTROCARDIOGRAM TRACING: CPT | Performed by: PHYSICIAN ASSISTANT

## 2023-01-07 PROCEDURE — 71046 X-RAY EXAM CHEST 2 VIEWS: CPT

## 2023-01-07 RX ORDER — METHOCARBAMOL 500 MG/1
500 TABLET, FILM COATED ORAL 3 TIMES DAILY
Qty: 15 TABLET | Refills: 0 | Status: SHIPPED | OUTPATIENT
Start: 2023-01-07 | End: 2023-01-12

## 2023-01-07 RX ORDER — PREDNISONE 10 MG/1
40 TABLET ORAL DAILY
Qty: 20 TABLET | Refills: 0 | Status: SHIPPED | OUTPATIENT
Start: 2023-01-07 | End: 2023-01-12

## 2023-01-07 RX ORDER — PREDNISONE 20 MG/1
60 TABLET ORAL ONCE
Status: COMPLETED | OUTPATIENT
Start: 2023-01-07 | End: 2023-01-07

## 2023-01-07 RX ORDER — IPRATROPIUM BROMIDE AND ALBUTEROL SULFATE 2.5; .5 MG/3ML; MG/3ML
1 SOLUTION RESPIRATORY (INHALATION) ONCE
Status: COMPLETED | OUTPATIENT
Start: 2023-01-07 | End: 2023-01-07

## 2023-01-07 RX ADMIN — PREDNISONE 60 MG: 20 TABLET ORAL at 18:58

## 2023-01-07 RX ADMIN — IPRATROPIUM BROMIDE AND ALBUTEROL SULFATE 1 AMPULE: .5; 2.5 SOLUTION RESPIRATORY (INHALATION) at 18:46

## 2023-01-07 ASSESSMENT — LIFESTYLE VARIABLES: HOW OFTEN DO YOU HAVE A DRINK CONTAINING ALCOHOL: NEVER

## 2023-01-07 NOTE — ED PROVIDER NOTES
Independent [facility] Visit. [address]  Department of Emergency Medicine   ED  Encounter Note  Admit Date/RoomTime: 2023  6:28 PM  ED Room:   NAME: Chris Henry  : 1960  MRN: [id number]     Chief Complaint:  Shortness of Breath (Short of breath for past week, cough/congestion, out of lyrica and c/o nausea)    HISTORY OF PRESENT ILLNESS        Chris Henry is a 58 y.o. female who presents to the ED with a complaint of shortness of breath, shakiness, nausea and out of Lyrica. Patient admits to history of COPD. Active tobacco smoking. States for about 3 to 4 days now she is had a flareup of her COPD with shortness of breath and cough. Cough is productive of her regular phlegm. Has felt hot and chilled also. Patient does have albuterol and Symbicort inhalers. Does not have a nebulizer. Is not oxygen dependent. Patient is concerned for COVID illness. States she has been visiting her dad in the nursing home frequently and is concerned she has been exposed. Patient is COVID vaccinated. Patient is flu vaccinated. Patient complains of being out of her Lyrica. She is on Lyrica for chronic neck pain with neuropathy down the left arm. She was supposed to attend with pain management but apparently there was an insurance authorization issue and she was not able to be treated by them. I did review her OARRS and she had Lyrica prescribed  for a 30-day supply. Patient feels shaky and nauseated. She does not know if it is because she is out of her Lyrica or from the pain being worse while out of her Lyrica. Overall symptoms are moderate in severity. Exertion makes everything worse. ROS   Pertinent positives and negatives are stated within HPI, all other systems reviewed and are negative.     Past Medical History:  has a past medical history of Arthritis, Bipolar 1 disorder ([address] Chronic fatigue, COPD (chronic obstructive pulmonary disease) ([address] Depression, Fibromyalgia, GERD (gastroesophageal reflux disease), Migraines, Neuropathy, and Short-term memory loss. Surgical History:  has a past surgical history that includes Hysterectomy; Cholecystectomy; sinus surgery; Colonoscopy; Endoscopy, colon, diagnostic; cardiovascular stress test (02/12/2021); esophageal motility study (N/A, 5/12/2021); and Shoulder arthroscopy (Left, 8/10/2021). Social History:  reports that she has been smoking cigarettes. She has a 44.00 pack-year smoking history. She has never used smokeless tobacco. She reports that she does not drink alcohol and does not use drugs. Family History: family history is not on file. Allergies: Erythromycin    PHYSICAL EXAM   Oxygen Saturation Interpretation: Normal on room air analysis. ED Triage Vitals   BP Temp Temp src Heart Rate Resp SpO2 Height Weight   01/07/23 1745 01/07/23 1743 -- 01/07/23 1743 01/07/23 1743 01/07/23 1743 -- 01/07/23 1745   135/60 98.3 °F (36.8 °C)  (!) 115 22 97 %  122 lb (55.3 kg)         General:  NAD. Alert and Oriented. Well-appearing. Skin:  Warm, dry. No rashes. Head:  Normocephalic. Atraumatic. Eyes:  EOMI. Conjunctiva normal.  ENT:  Oral mucosa moist.  Airway patent. Neck:  Supple. Normal ROM. Respiratory:  No respiratory distress. No labored breathing. Lungs slightly diminished without rales, rhonchi or wheezing. Oxygen saturation 97% on room air. Cardiovascular:  Regular rate. No Murmur. No peripheral edema. Extremities warm and good color. Chest:  Abdomen:  Soft, nondistended. Normal bowel sounds. Nontender to palpation all 4 quadrants. Negative rebound, negative guarding. Rectal:  Gu: Bladder nontender and non distended. No CVA tenderness. Pelvic:  Extremities:  Normal ROM. Nontender to palpation. Atraumatic. Back:  Normal ROM. Nontender to palpation. Neuro:  Alert and Oriented to person, place, time and situation. Normal LOC. Moves all extremities.   Speech fluent. Psych:  Calm and Cooperative. Normal thought process. Normal judgement. Lab / Imaging Results   (All laboratory and radiology results have been personally reviewed by myself)  Labs:  Results for orders placed or performed during the hospital encounter of 01/07/23   COVID-19, Rapid    Specimen: Nasopharyngeal Swab   Result Value Ref Range    SARS-CoV-2, NAAT Not Detected Not Detected   RAPID INFLUENZA A/B ANTIGENS    Specimen: Nasopharyngeal   Result Value Ref Range    Influenza A by PCR Not Detected Not Detected    Influenza B by PCR Not Detected Not Detected     Imaging: All Radiology results interpreted by Radiologist unless otherwise noted. XR CHEST (2 VW)   Final Result   No acute cardiopulmonary process. EKG done 1833. Normal sinus rhythm with a heart rate of 69. Normal CA interval.  Bifid P wave. No ST elevation or depression. QTc 430. Negative STEMI, reviewed by Dr. Aliza Tamez. ED Course / Medical Decision Making     Medications   ipratropium-albuterol (DUONEB) nebulizer solution 1 ampule (1 ampule Inhalation Given 1/7/23 1846)   predniSONE (DELTASONE) tablet 60 mg (60 mg Oral Given 1/7/23 1858)        Re-examination:  1/7/23       Time: 2010 positive improvement after the breathing treatment. Patients condition . Consult(s):   None    Procedure(s):   None    MDM:     ED COURSE / MEDICAL DECISION MAKING    Recap: 58-year-old with a history of COPD and chronic neck pain presents with a complaint of shortness of breath, cough and her chronic neck pain and out of Lyrica. History was provided by patient and there are no social determinants affecting patient care. Records Reviewed: Outpatient records for pain management. OARRS  Results/Interventions:   Regarding the COPD her chest x-ray is negative for pneumonia. COVID test is negative. Influenza test is negative. She is not hypoxic. She was given a DuoNeb breathing treatment here with improvement of her respiratory status. She has albuterol. I will discharge her with steroids. Advised to call PCP to talk about a nebulizer machine and nebulizer meds. Regarding the Lyrica I did review an OARRS and she has an active prescription. I did explain to her that I will not be able to provide any more Lyrica for her at this time. I however will write for muscle relaxer to see if that helps her chronic neck pain. Differential Diagnoses considered but not fully inclusive: COPD with acute exacerbation. Pneumonia. COVID. Influenza. Viral URI. Medication refill. Chronic neck pain. I am Primary Clinician of Record and case was not discussed with ED Physician. Diagnosis, Disposition and any Prescriptions as follows      Plan of Care/Counseling:  Vicente Ferris reviewed today's visit with the patient in addition to providing specific details for the plan of care and counseling regarding the diagnosis and prognosis. Questions are answered at this time and are agreeable with the plan. ASSESSMENT     1. COPD exacerbation ([address] New Problem   2. Chronic neck pain Stable but not controlled     PLAN   Discharged home. Patient condition is good    New Medications     New Prescriptions    METHOCARBAMOL (ROBAXIN) 500 MG TABLET    Take 1 tablet by mouth 3 times daily for 5 days    PREDNISONE (DELTASONE) 10 MG TABLET    Take 4 tablets by mouth daily for 5 days     Electronically signed by BRANDEN Ferris   DD: 1/7/23  **This report was transcribed using voice recognition software. Every effort was made to ensure accuracy; however, inadvertent computerized transcription errors may be present.   END OF ED PROVIDER NOTE       Vicente Ferris  01/07/23 2037

## 2023-01-08 LAB
EKG ATRIAL RATE: 69 BPM
EKG P AXIS: 63 DEGREES
EKG P-R INTERVAL: 146 MS
EKG Q-T INTERVAL: 402 MS
EKG QRS DURATION: 78 MS
EKG QTC CALCULATION (BAZETT): 430 MS
EKG R AXIS: -71 DEGREES
EKG T AXIS: 42 DEGREES
EKG VENTRICULAR RATE: 69 BPM

## 2023-01-08 PROCEDURE — 93010 ELECTROCARDIOGRAM REPORT: CPT | Performed by: INTERNAL MEDICINE

## 2023-01-08 NOTE — ED NOTES
Discussed with the patient and all questioned fully answered. She will call me if any problems arise.        Gilda Holt RN  01/07/23 2053

## 2023-01-09 ENCOUNTER — TELEPHONE (OUTPATIENT)
Dept: ADMINISTRATIVE | Age: 63
End: 2023-01-09

## 2023-01-11 ENCOUNTER — OFFICE VISIT (OUTPATIENT)
Dept: ORTHOPEDIC SURGERY | Age: 63
End: 2023-01-11
Payer: MEDICARE

## 2023-01-11 VITALS — TEMPERATURE: 98 F | HEIGHT: 64 IN | BODY MASS INDEX: 20.83 KG/M2 | WEIGHT: 122 LBS

## 2023-01-11 DIAGNOSIS — M19.012 OSTEOARTHRITIS OF GLENOHUMERAL JOINT, LEFT: ICD-10-CM

## 2023-01-11 DIAGNOSIS — Z71.82 EXERCISE COUNSELING: ICD-10-CM

## 2023-01-11 DIAGNOSIS — M54.12 CERVICAL RADICULOPATHY: Primary | ICD-10-CM

## 2023-01-11 DIAGNOSIS — M75.42 IMPINGEMENT SYNDROME OF LEFT SHOULDER: ICD-10-CM

## 2023-01-11 PROCEDURE — G8484 FLU IMMUNIZE NO ADMIN: HCPCS | Performed by: ORTHOPAEDIC SURGERY

## 2023-01-11 PROCEDURE — G8427 DOCREV CUR MEDS BY ELIG CLIN: HCPCS | Performed by: ORTHOPAEDIC SURGERY

## 2023-01-11 PROCEDURE — 4004F PT TOBACCO SCREEN RCVD TLK: CPT | Performed by: ORTHOPAEDIC SURGERY

## 2023-01-11 PROCEDURE — 3017F COLORECTAL CA SCREEN DOC REV: CPT | Performed by: ORTHOPAEDIC SURGERY

## 2023-01-11 PROCEDURE — 99214 OFFICE O/P EST MOD 30 MIN: CPT | Performed by: ORTHOPAEDIC SURGERY

## 2023-01-11 PROCEDURE — G8420 CALC BMI NORM PARAMETERS: HCPCS | Performed by: ORTHOPAEDIC SURGERY

## 2023-01-11 RX ORDER — IBUPROFEN 800 MG/1
800 TABLET ORAL EVERY 8 HOURS PRN
Qty: 90 TABLET | Refills: 2 | Status: SHIPPED | OUTPATIENT
Start: 2023-01-11 | End: 2023-02-10

## 2023-01-11 NOTE — PROGRESS NOTES
Chief Complaint   Patient presents with    Shoulder Pain     Left shoulder pain follow up. Would like some medication for the pain. Shoulder pain has gotten worse since last visit. Patient would like a refill of ibuprofen and norco for headaches. HPI:    Patient is 58 y.o. female presents today for follow-up of left shoulder pain. Patient reports Left shoulder DOS 8/10/21. Shoulder was doing well but over the last couple of weeks it has been a little painful again. Still doing exercises with bands she got from PT. Previous treatments include rest, ice, heat, NSAIDs, HEP without much relief. No follow-up today, patient states that she had good relief from subacromial cortisone injection but she also states that her pain is now all over her arm and shoots down into her hand. Follows up after. ROS:    Skin: (-) rash,(-) psoriasis,(-) eczema, (-)skin cancer. Neurologic: (-)numbness, (-)tingling, (-)headaches, (-) LOC. Cardiovascular: (-) Chest pain, (-) swelling in legs/feet, (-) SOB, (-) cramping in legs/feet with walking.     All other review of systems negative except stated above or in HPI      Past Medical History:   Diagnosis Date    Arthritis     Bipolar 1 disorder (HCC)     Chronic fatigue     COPD (chronic obstructive pulmonary disease) (HCC)     Depression     Fibromyalgia     GERD (gastroesophageal reflux disease)     Migraines     Neuropathy     Short-term memory loss      Past Surgical History:   Procedure Laterality Date    CARDIOVASCULAR STRESS TEST  02/12/2021    Lexiscan stress test    CHOLECYSTECTOMY      COLONOSCOPY      ENDOSCOPY, COLON, DIAGNOSTIC      ESOPHAGEAL MOTILITY STUDY N/A 5/12/2021    ESOPHAGEAL MOTILITY/MANOMETRY STUDY performed by Opal Montelongo DO at [facility] (CERVIX STATUS UNKNOWN)      SHOULDER ARTHROSCOPY Left 8/10/2021    LEFT SHOULDER ARTHROSCOPY, SUBACROMIAL DECOMPRESSION, DEBRIDEMENT (ARTHREX) performed by Trae Glass DO at [facility]    SINUS SURGERY         Current Outpatient Medications:     ibuprofen (ADVIL;MOTRIN) 800 MG tablet, Take 1 tablet by mouth every 8 hours as needed for Pain, Disp: 90 tablet, Rfl: 2    albuterol sulfate HFA (VENTOLIN HFA) 108 (90 Base) MCG/ACT inhaler, Inhale 2 puffs into the lungs 4 times daily as needed for Wheezing, Disp: 18 g, Rfl: 0    ondansetron (ZOFRAN ODT) 4 MG disintegrating tablet, Take 1 tablet by mouth every 8 hours as needed for Nausea or Vomiting, Disp: 10 tablet, Rfl: 0    pregabalin (LYRICA) 150 MG capsule, Take 1 capsule by mouth in the morning and 1 capsule before bedtime. Do all this for 30 days. , Disp: 60 capsule, Rfl: 2    OLANZapine (ZYPREXA) 7.5 MG tablet, Take 7.5 mg by mouth nightly, Disp: , Rfl:     traZODone (DESYREL) 100 MG tablet, TAKE 1 TABLET BY MOUTH AT BEDTIME, Disp: , Rfl:     pantoprazole (PROTONIX) 40 MG tablet, Take 40 mg by mouth daily, Disp: , Rfl:     SYMBICORT 160-4.5 MCG/ACT AERO, Inhale 2 puffs into the lungs 2 times daily as needed, Disp: , Rfl:     diphenoxylate-atropine (LOMOTIL) 2.5-0.025 MG per tablet, TAKE 1 TO 2 TABLETS BY MOUTH EVERY 8 HOURS AS NEEDED, Disp: , Rfl:     pregabalin (LYRICA) 75 MG capsule, TAKE 1 CAPSULE BY MOUTH FOUR TIMES DAILY, Disp: , Rfl:   Allergies   Allergen Reactions    Erythromycin Rash     Social History     Socioeconomic History    Marital status:      Spouse name: Not on file    Number of children: Not on file    Years of education: Not on file    Highest education level: Not on file   Occupational History    Not on file   Tobacco Use    Smoking status: Every Day     Packs/day: 1.00     Years: 44.00     Pack years: 44.00     Types: Cigarettes    Smokeless tobacco: Never   Vaping Use    Vaping Use: Never used   Substance and Sexual Activity    Alcohol use: No    Drug use: No    Sexual activity: Not on file   Other Topics Concern    Not on file   Social History Narrative    Not on file     Social Determinants of Health Financial Resource Strain: Not on file   Food Insecurity: Not on file   Transportation Needs: Not on file   Physical Activity: Not on file   Stress: Not on file   Social Connections: Not on file   Intimate Partner Violence: Not on file   Housing Stability: Not on file     No family history on file. Physical Exam:    Temp 98 °F (36.7 °C)   Ht 5' 3.5\" (1.613 m)   Wt 122 lb (55.3 kg)   BMI 21.27 kg/m²     GENERAL: alert, appears stated age, cooperative, no acute distress    HEENT: Head is normocephalic, atraumatic. PERRLA. SKIN: Clean, dry, intact. There is not any cellulitis or cutaneous lesions noted in the lower extremities     PULMONARY: breathing is regular and unlabored, no acute distress     CV: The bilateral lower extremities are warm and well-perfused with brisk capillary refill. 2+ pulses LE bilateral.     ABDOMINAL: Nontender, nondistended     PSYCHIATRY: Pleasant mood, appropriate behavior, follows commands     NEURO: Sensation is intact distally with light touch with no alteration. Motor exam of the lower extremities show quadriceps, hamstrings, foot dorsiflexion and plantarflexion grossly intact 5/5. LYMPH: No lymphedema present distally in upper or lower extremity. MUSCULOSKELETAL:  Examination of the Left shoulder shows: There is not a deformity. There is not erythema. There is not soft tissue swelling. Deltoid region is not tender to palpation. AC Joint is not tender to palpation. Clavicle is not tender to palpation. Bicipital Groove is not tender to palpation. Pectoralis  is not tender to palpation. Scapula/ trapezius is  tender to palpation.   Right:  ROM Full, Strength: Supraspinatus 5/5, Infraspinatus 5/5, Subscapularis 5/5  Left:  /170/60, Strength: Supraspinatus 5/5, Infraspinatus 5/5, Subscapularis 5/5  LeftShoulder:  Crepitus:  no   Tenderness:  none   Effusion:   none   Impingement: negative   Empty Can:  negative   Speed's:  negative      Apprehension: negative   Cross Arm Sign:  negative   [place]'s:  negative       Neer's:  negative      Belly Press Test:  negative      Drop Arm Test:  negative     no change since last visit. Imaging:  Surgical pictures and imaging reviewed with patient in detail      CT SHOULDER LEFT WO CONTRAST    Result Date: 5/17/2022  EXAMINATION: CT OF THE LEFT SHOULDER WITHOUT CONTRAST 5/16/2022 12:35 pm TECHNIQUE: CT of the left shoulder was performed without the administration of intravenous contrast.  Multiplanar reformatted images are provided for review. Automated exposure control, iterative reconstruction, and/or weight based adjustment of the mA/kV was utilized to reduce the radiation dose to as low as reasonably achievable. COMPARISON: None. HISTORY ORDERING SYSTEM PROVIDED HISTORY: Nontraumatic tear of left rotator cuff, unspecified tear extent FINDINGS: Moderate degenerative changes of the glenohumeral and acromioclavicular joints. Linear lucency in the anterior margin of the humeral head laterally is consistent with previous surgery. There also some minimal degenerative cystic changes in the humeral head and superior glenoid. No dislocation or acute fracture. Soft tissues could be better assessed with MRI or arthrogram.  Allowing for this, small joint effusion is suspected. Mild supraspinatus volume loss. No obvious retracted rotator cuff tear. Mild emphysematous changes in the visualized left lung. Prominent degenerative changes. No acute bony abnormality. Shoulder joint effusion is suspected. MRI would be useful if symptoms persist. RECOMMENDATIONS: Unavailable       Impression    Diagnostic Interpretation: This study was abnormal.       Electrodiagnosis: There is electrodiagnostic evidence of acervical radiculopathy.    \" Location: left C6.   \" Nature: [ X ] Axonal   [  ] Demyelinating  [  ] Mixed axonal and demyelinating                      [  ] Sensory [ X ] Motor               [  ] Mixed sensorimotor Kerrie  ] with active denervation       [  ] without active denervation   \" Duration: Chronic   \" Severity: moderate   \" Prognosis: Poor       Previous Study: There is not a prior study for comparison. Follow up EMG is recommended if clinically warranted        Kaylyn Tomlin was seen today for shoulder pain. Diagnoses and all orders for this visit:    Cervical radiculopathy    Impingement syndrome of left shoulder    Osteoarthritis of glenohumeral joint, left    Exercise counseling    Other orders  -     ibuprofen (ADVIL;MOTRIN) 800 MG tablet; Take 1 tablet by mouth every 8 hours as needed for Pain            Patient seen and examined. Imaging reviewed. Natural history and course discussed with patient along with chart in detail. Treatment options discussed with patient in detail including risks and benefits. At this point patient has done well status post left shoulder arthroscopy. Patient does have a consider amount of existing glenohumeral arthritic change noted. Patient has done well however did not complete physical therapy due to pain of right shoulder. Patient is requesting continued chronic pain medication at this time. Patient was educated that she will be referred to pain management services at this time for treatment of chronic pain issues. Patient seen and examined. Imaging, pathology, and EMG reviewed with patient in detail. Natural history and course discussed with patient in long discussion  Treatment options discussed with patient in detail including risks and benefits. Patient instructed to follow back up with physical medicine rehabilitation since appears to have axonal involvement involving C6. Patient seen and examined. MRI reviewed with patient in detail. Natural history and course discussed with patient in long discussion  Treatment options discussed with patient in detail including risks and benefits.   Patient should do well with conservative management as patient would like to avoid surgery at this time. Patient educated about the healing rates with this condition. Patient does smoke and does have secondhand smoke exposure. In this discussion of approximately 5 minutes, patient was counseled about decrease in smoking exposure regards to healing and overall good health. Patient seems reluctant but is willing to listen to the discussion in detail. She states that she will try to cut down as much as possible and states that she will try to quit completely by the next visit. In this 15 minute conversation during the visit, Patient was informed of the potential for addiction of long term use of narcotic medication for pain control. I encouraged the patient to gradually lessen the frequency of the dosage due to the long term addictive effects. Patient verbalized understanding and states will try to cut back as much as possible. Keturah Roberts DO           25 minutes was spent with patient. 50% or greater was spent counseling the patient.

## 2023-01-24 ENCOUNTER — TELEPHONE (OUTPATIENT)
Dept: NEUROSURGERY | Age: 63
End: 2023-01-24

## 2023-01-24 NOTE — TELEPHONE ENCOUNTER
Pt is having epidural on 2/1/23 to help with shoulder she is also scheduled for an EMG on 2/2/23 she is calling to see if she should still have the EMG or wait to see if the epidural helps?

## 2023-02-21 ENCOUNTER — ANESTHESIA EVENT (OUTPATIENT)
Dept: OPERATING ROOM | Age: 63
End: 2023-02-21
Payer: MEDICARE

## 2023-02-21 RX ORDER — LANSOPRAZOLE 30 MG/1
30 CAPSULE, DELAYED RELEASE ORAL DAILY
COMMUNITY
Start: 2023-01-31 | End: 2023-02-21

## 2023-02-21 RX ORDER — FAMOTIDINE 40 MG/1
40 TABLET, FILM COATED ORAL DAILY
COMMUNITY
Start: 2023-01-31

## 2023-02-21 RX ORDER — VENLAFAXINE HYDROCHLORIDE 75 MG/1
75 CAPSULE, EXTENDED RELEASE ORAL DAILY
COMMUNITY
Start: 2023-01-24

## 2023-03-01 ENCOUNTER — HOSPITAL ENCOUNTER (OUTPATIENT)
Dept: OPERATING ROOM | Age: 63
Setting detail: OUTPATIENT SURGERY
Discharge: HOME | End: 2023-03-01
Attending: ANESTHESIOLOGY
Payer: MEDICARE

## 2023-03-01 ENCOUNTER — ANESTHESIA (OUTPATIENT)
Dept: OPERATING ROOM | Age: 63
End: 2023-03-01
Payer: MEDICARE

## 2023-03-01 ENCOUNTER — HOSPITAL ENCOUNTER (OUTPATIENT)
Age: 63
Setting detail: OUTPATIENT SURGERY
Discharge: HOME | End: 2023-03-01
Attending: ANESTHESIOLOGY | Admitting: ANESTHESIOLOGY
Payer: MEDICARE

## 2023-03-01 VITALS
WEIGHT: 127 LBS | HEIGHT: 63 IN | DIASTOLIC BLOOD PRESSURE: 64 MMHG | TEMPERATURE: 97.6 F | RESPIRATION RATE: 20 BRPM | HEART RATE: 74 BPM | OXYGEN SATURATION: 97 % | BODY MASS INDEX: 22.5 KG/M2 | SYSTOLIC BLOOD PRESSURE: 113 MMHG

## 2023-03-01 DIAGNOSIS — M50.30 DDD (DEGENERATIVE DISC DISEASE), CERVICAL: ICD-10-CM

## 2023-03-01 PROBLEM — M48.02 NEURAL FORAMINAL STENOSIS OF CERVICAL SPINE: Chronic | Status: ACTIVE | Noted: 2023-03-01

## 2023-03-01 PROBLEM — M50.20 PROTRUDED CERVICAL DISC: Chronic | Status: ACTIVE | Noted: 2023-03-01

## 2023-03-01 PROBLEM — M54.12 CERVICAL RADICULOPATHY: Chronic | Status: ACTIVE | Noted: 2023-03-01

## 2023-03-01 PROCEDURE — 2500000003 HC RX 250 WO HCPCS: Performed by: ANESTHESIOLOGY

## 2023-03-01 PROCEDURE — 7100000010 HC PHASE II RECOVERY - FIRST 15 MIN: Performed by: ANESTHESIOLOGY

## 2023-03-01 PROCEDURE — 2580000003 HC RX 258: Performed by: ANESTHESIOLOGY

## 2023-03-01 PROCEDURE — 7100000011 HC PHASE II RECOVERY - ADDTL 15 MIN: Performed by: ANESTHESIOLOGY

## 2023-03-01 PROCEDURE — 6360000002 HC RX W HCPCS: Performed by: ANESTHESIOLOGY

## 2023-03-01 PROCEDURE — 2709999900 HC NON-CHARGEABLE SUPPLY: Performed by: ANESTHESIOLOGY

## 2023-03-01 PROCEDURE — 3600000005 HC SURGERY LEVEL 5 BASE: Performed by: ANESTHESIOLOGY

## 2023-03-01 PROCEDURE — 3209999900 FLUORO FOR SURGICAL PROCEDURES

## 2023-03-01 RX ORDER — LIDOCAINE HYDROCHLORIDE 10 MG/ML
INJECTION, SOLUTION EPIDURAL; INFILTRATION; INTRACAUDAL; PERINEURAL PRN
Status: DISCONTINUED | OUTPATIENT
Start: 2023-03-01 | End: 2023-03-01 | Stop reason: ALTCHOICE

## 2023-03-01 ASSESSMENT — PAIN DESCRIPTION - PAIN TYPE: TYPE: SURGICAL PAIN

## 2023-03-01 ASSESSMENT — PAIN - FUNCTIONAL ASSESSMENT: PAIN_FUNCTIONAL_ASSESSMENT: 0-10

## 2023-03-01 ASSESSMENT — PAIN DESCRIPTION - LOCATION: LOCATION: NECK

## 2023-03-01 ASSESSMENT — PAIN DESCRIPTION - DESCRIPTORS
DESCRIPTORS: ACHING

## 2023-03-01 ASSESSMENT — PAIN SCALES - GENERAL
PAINLEVEL_OUTOF10: 5
PAINLEVEL_OUTOF10: 7

## 2023-03-01 ASSESSMENT — PAIN DESCRIPTION - ORIENTATION: ORIENTATION: LEFT

## 2023-03-01 NOTE — OP NOTE
Ana Maria Quiroz    3/1/2023    Preoperative Diagnoses: Cervical Radiculopathy , Neural foraminal Stenosis Cervical Spine, Protruding/ DDD Cervical Spine     Postoperative Diagnoses: Same     Procedure Performed: Therapeutic Left cervical transforaminal epidural done under fluoroscopic guidance. # 1     Anesthesia: Local     Brief History:  Ana Maria Quiroz comes in today to [facility] for the first therapeutic cervical transforaminal epidural.  She was explained all of this in great detail, including potential complications, and she did request the we proceed.  Her complete History & Physical examination was reviewed and it is unchanged.      Description of Procedure:    Ana Maria was taken to the procedure room at [facility] where with the assistance of a nurse, she was placed in the supine position. The  cervical area was prepped and draped in the usual manner. A time-out was performed and confirmed the patient’s name, date of birth, the procedure to be performed and skin marked for appropriate site and side of procedure. All questions and concerns were answered and the patient requested we proceed. A #27 gauge ½ inch local needle using Lidocaine 1%  5ml. was used for local skin wheal.   A #22-gauge 3 ½ inch disposable BD spinal needle was introduced paraspinally under direct fluoroscopic guidance with three-dimensional guidance to the cephalad-most portion of the neuroforamen of C4-5 C5-6 in sequential order. After 1 ml of Isovue 300 dye was used to confirm that the tip of the needle was in the epidural space and not subarachnoid or intravascular with a negative aspiration test. Nine ml of 0.9% Normal Saline  (PF) mixed with Dexamethasone 10 mg. 1 ml. was injected at each of the three levels.   Following completion of the same it was clear to see under direct fluoroscopic guidance that there was improvement in the neuroforminal stenosis and flow of dye down the nerve root  following completion of the therapeutic transforaminal epidural injection. She showed no signs of complications. She will be reassessed  and we will proceed accordingly at that time or sooner if need be. Ricih Connell has been given discharge instructions.       Electronically signed by Kavon Sanchez DO

## 2023-03-01 NOTE — H&P
Update History & Physical    The patient's History and Physical of 02/27/2023 was reviewed with the patient and there were no significant changes. I examined the patient and there were no significant changes from the previous History and Physical.    Plan: The risk, benefits, expected outcome, and alternative to the recommended procedure have been discussed with the patient. Patient understands and wants to proceed with the procedure.       Electronically signed by Brayan Vu DO on 3/1/23 at 7:24 AM EST

## 2023-03-01 NOTE — DISCHARGE INSTRUCTIONS
Post-op instruction Nicholas Randall  [phone] Renard Corbett  [phone] [facility])      Rest 12-24 hours following procedure. DO NOT DRIVE till the following day. Keep dressing clean and dry. Do not bathe, shower, or sit in hot tub the day of procedure . You may remove the dressing following A.M. You may return to work/school tomorrow. Drink extra fluids for the next 24 hours. Resume your regular diet.    (ONLY IF TAKING)-Resume previously prescribed medications. Resume Coumadin, Plavix, NSAIDS, Ibuprofen products 24 hours after procedure. You need to have a responsible adult stay with you this evening. If you experience any discomfort relating to this procedure, you may take Tylenol 2 tablets every 4 hrs as needed for pain and/or apply ice to the affected area. If you experience any unusual symptoms or problems, call your physicians answering service at [phone] or go directly to the nearest Emergency Department. 10. If you are diabetic, your blood sugar may be elevated for several days from         Medication used during the procedure. 11. If you do not have any further procedures scheduled, please call for a follow           up appointment at [address] [place] [address]. [phone]     Infection After Surgery: Care Instructions  Overview  After surgery, an infection is always possible. It doesn't mean that the surgery didn't go well. Because an infection can be serious, your doctor has taken steps to manage it. Your doctor checked the infection and cleaned it if necessary. Your doctor may have made an opening in the area so that the pus can drain out. You may have gauze in the cut so that the area will stay open and keep draining. You may need antibiotics. You will need to follow up with your doctor to make sure the infection has gone away. Follow-up care is a key part of your treatment and safety.  Be sure to make and go to all appointments, and call your doctor if you are having problems. It's also a good idea to know your test results and keep a list of the medicines you take. How can you care for yourself at home? Make sure your surgeon knows about the infection, especially if you saw another doctor about your symptoms. If your doctor prescribed antibiotics, take them as directed. Do not stop taking them just because you feel better. You need to take the full course of antibiotics. Ask your doctor if you can take an over-the-counter pain medicine, such as acetaminophen (Tylenol), ibuprofen (Advil, Motrin), or naproxen (Aleve). Be safe with medicines. Read and follow all instructions on the label. Do not take two or more pain medicines at the same time unless the doctor told you to. Many pain medicines have acetaminophen, which is Tylenol. Too much acetaminophen (Tylenol) can be harmful. Prop up the area on a pillow anytime you sit or lie down during the next 3 days. Try to keep it above the level of your heart. This will help reduce swelling. Keep the skin clean and dry. You may have a bandage over the cut (incision). A bandage helps the incision heal and protects it. Your doctor will tell you how to take care of this. Keep it clean and dry. You may have drainage from the wound. If your doctor told you how to care for your incision, follow your doctor's instructions. If you did not get instructions, follow this general advice:  Wash around the incision with clean water 2 times a day. Don't use hydrogen peroxide or alcohol, which can slow healing. When should you call for help? Call your doctor now or seek immediate medical care if:    You have signs that your infection is getting worse, such as: Increased pain, swelling, warmth, or redness in the area. Red streaks leading from the area. Pus draining from the wound. A new or higher fever. Watch closely for changes in your health, and be sure to contact your doctor if you have any problems.   Where can you learn more?  Go to http://www.woods.com/ and enter C340 to learn more about \"Infection After Surgery: Care Instructions. \"  Current as of: January 20, 2022               Content Version: 13.5  © 4658-5830 [employer]. Care instructions adapted under license by [facility] ([facility]). If you have questions about a medical condition or this instruction, always ask your healthcare professional. [place] [address] any warranty or liability for your use of this information.

## 2023-03-06 ENCOUNTER — HOSPITAL ENCOUNTER (EMERGENCY)
Age: 63
Discharge: HOME | End: 2023-03-06
Payer: MEDICARE

## 2023-03-06 ENCOUNTER — APPOINTMENT (OUTPATIENT)
Dept: CT IMAGING | Age: 63
End: 2023-03-06
Payer: MEDICARE

## 2023-03-06 VITALS
WEIGHT: 125 LBS | DIASTOLIC BLOOD PRESSURE: 78 MMHG | OXYGEN SATURATION: 98 % | TEMPERATURE: 97.8 F | SYSTOLIC BLOOD PRESSURE: 128 MMHG | BODY MASS INDEX: 22.14 KG/M2 | HEART RATE: 84 BPM

## 2023-03-06 DIAGNOSIS — R31.9 HEMATURIA, UNSPECIFIED TYPE: ICD-10-CM

## 2023-03-06 DIAGNOSIS — R10.13 ABDOMINAL PAIN, EPIGASTRIC: Primary | ICD-10-CM

## 2023-03-06 DIAGNOSIS — E86.0 DEHYDRATION: ICD-10-CM

## 2023-03-06 LAB
ALBUMIN SERPL-MCNC: 4.4 G/DL (ref 3.5–5.2)
ALP BLD-CCNC: 75 U/L (ref 35–104)
ALT SERPL-CCNC: 9 U/L (ref 0–32)
ANION GAP SERPL CALCULATED.3IONS-SCNC: 11 MMOL/L (ref 7–16)
AST SERPL-CCNC: 10 U/L (ref 0–31)
BACTERIA: ABNORMAL /HPF
BASOPHILS ABSOLUTE: 0.03 E9/L (ref 0–0.2)
BASOPHILS RELATIVE PERCENT: 0.4 % (ref 0–2)
BILIRUB SERPL-MCNC: 0.6 MG/DL (ref 0–1.2)
BILIRUBIN URINE: ABNORMAL
BLOOD, URINE: ABNORMAL
BUN BLDV-MCNC: 12 MG/DL (ref 6–23)
CALCIUM SERPL-MCNC: 9.8 MG/DL (ref 8.6–10.2)
CHLORIDE BLD-SCNC: 104 MMOL/L (ref 98–107)
CLARITY: CLEAR
CO2: 24 MMOL/L (ref 22–29)
COLOR: YELLOW
CREAT SERPL-MCNC: 0.8 MG/DL (ref 0.5–1)
EOSINOPHILS ABSOLUTE: 0.03 E9/L (ref 0.05–0.5)
EOSINOPHILS RELATIVE PERCENT: 0.4 % (ref 0–6)
EPITHELIAL CELLS, UA: ABNORMAL /HPF
GFR SERPL CREATININE-BSD FRML MDRD: >60 ML/MIN/1.73
GLUCOSE BLD-MCNC: 111 MG/DL (ref 74–99)
GLUCOSE URINE: NEGATIVE MG/DL
HCT VFR BLD CALC: 47.7 % (ref 34–48)
HEMOGLOBIN: 15.7 G/DL (ref 11.5–15.5)
IMMATURE GRANULOCYTES #: 0.03 E9/L
IMMATURE GRANULOCYTES %: 0.4 % (ref 0–5)
KETONES, URINE: >=80 MG/DL
LACTIC ACID: 1.1 MMOL/L (ref 0.5–2.2)
LEUKOCYTE ESTERASE, URINE: NEGATIVE
LIPASE: 15 U/L (ref 13–60)
LYMPHOCYTES ABSOLUTE: 1.05 E9/L (ref 1.5–4)
LYMPHOCYTES RELATIVE PERCENT: 14.4 % (ref 20–42)
MCH RBC QN AUTO: 29.6 PG (ref 26–35)
MCHC RBC AUTO-ENTMCNC: 32.9 % (ref 32–34.5)
MCV RBC AUTO: 90 FL (ref 80–99.9)
MONOCYTES ABSOLUTE: 0.47 E9/L (ref 0.1–0.95)
MONOCYTES RELATIVE PERCENT: 6.4 % (ref 2–12)
NEUTROPHILS ABSOLUTE: 5.69 E9/L (ref 1.8–7.3)
NEUTROPHILS RELATIVE PERCENT: 78 % (ref 43–80)
NITRITE, URINE: NEGATIVE
PDW BLD-RTO: 13.9 FL (ref 11.5–15)
PH UA: 6 (ref 5–9)
PLATELET # BLD: 220 E9/L (ref 130–450)
PMV BLD AUTO: 11.2 FL (ref 7–12)
POTASSIUM SERPL-SCNC: 5.1 MMOL/L (ref 3.5–5)
PROTEIN UA: NEGATIVE MG/DL
RBC # BLD: 5.3 E12/L (ref 3.5–5.5)
RBC UA: ABNORMAL /HPF (ref 0–2)
SODIUM BLD-SCNC: 139 MMOL/L (ref 132–146)
SPECIFIC GRAVITY UA: >=1.03 (ref 1–1.03)
TOTAL PROTEIN: 7.4 G/DL (ref 6.4–8.3)
TROPONIN, HIGH SENSITIVITY: 7 NG/L (ref 0–9)
UROBILINOGEN, URINE: 1 E.U./DL
WBC # BLD: 7.3 E9/L (ref 4.5–11.5)
WBC UA: ABNORMAL /HPF (ref 0–5)

## 2023-03-06 PROCEDURE — 93005 ELECTROCARDIOGRAM TRACING: CPT | Performed by: PHYSICIAN ASSISTANT

## 2023-03-06 PROCEDURE — 83605 ASSAY OF LACTIC ACID: CPT

## 2023-03-06 PROCEDURE — 96374 THER/PROPH/DIAG INJ IV PUSH: CPT

## 2023-03-06 PROCEDURE — 74177 CT ABD & PELVIS W/CONTRAST: CPT

## 2023-03-06 PROCEDURE — 6360000004 HC RX CONTRAST MEDICATION: Performed by: RADIOLOGY

## 2023-03-06 PROCEDURE — 2580000003 HC RX 258: Performed by: PHYSICIAN ASSISTANT

## 2023-03-06 PROCEDURE — 85025 COMPLETE CBC W/AUTO DIFF WBC: CPT

## 2023-03-06 PROCEDURE — 96375 TX/PRO/DX INJ NEW DRUG ADDON: CPT

## 2023-03-06 PROCEDURE — 83690 ASSAY OF LIPASE: CPT

## 2023-03-06 PROCEDURE — 99285 EMERGENCY DEPT VISIT HI MDM: CPT

## 2023-03-06 PROCEDURE — 96361 HYDRATE IV INFUSION ADD-ON: CPT

## 2023-03-06 PROCEDURE — 81001 URINALYSIS AUTO W/SCOPE: CPT

## 2023-03-06 PROCEDURE — 84484 ASSAY OF TROPONIN QUANT: CPT

## 2023-03-06 PROCEDURE — 80053 COMPREHEN METABOLIC PANEL: CPT

## 2023-03-06 PROCEDURE — 6360000002 HC RX W HCPCS: Performed by: PHYSICIAN ASSISTANT

## 2023-03-06 RX ORDER — ONDANSETRON 2 MG/ML
4 INJECTION INTRAMUSCULAR; INTRAVENOUS ONCE
Status: COMPLETED | OUTPATIENT
Start: 2023-03-06 | End: 2023-03-06

## 2023-03-06 RX ORDER — FENTANYL CITRATE 0.05 MG/ML
50 INJECTION, SOLUTION INTRAMUSCULAR; INTRAVENOUS ONCE
Status: COMPLETED | OUTPATIENT
Start: 2023-03-06 | End: 2023-03-06

## 2023-03-06 RX ORDER — SUCRALFATE 1 G/1
1 TABLET ORAL 4 TIMES DAILY
Qty: 12 TABLET | Refills: 0 | Status: SHIPPED | OUTPATIENT
Start: 2023-03-06 | End: 2023-03-09

## 2023-03-06 RX ORDER — ONDANSETRON 4 MG/1
4 TABLET, FILM COATED ORAL EVERY 8 HOURS PRN
Qty: 12 TABLET | Refills: 0 | Status: SHIPPED | OUTPATIENT
Start: 2023-03-06 | End: 2023-03-06

## 2023-03-06 RX ORDER — PROMETHAZINE HYDROCHLORIDE 25 MG/1
25 TABLET ORAL EVERY 6 HOURS PRN
Qty: 12 TABLET | Refills: 0 | Status: SHIPPED | OUTPATIENT
Start: 2023-03-06 | End: 2023-03-09

## 2023-03-06 RX ORDER — DICYCLOMINE HYDROCHLORIDE 10 MG/1
10 CAPSULE ORAL 4 TIMES DAILY
Qty: 30 CAPSULE | Refills: 0 | Status: SHIPPED | OUTPATIENT
Start: 2023-03-06

## 2023-03-06 RX ORDER — 0.9 % SODIUM CHLORIDE 0.9 %
1000 INTRAVENOUS SOLUTION INTRAVENOUS ONCE
Status: COMPLETED | OUTPATIENT
Start: 2023-03-06 | End: 2023-03-06

## 2023-03-06 RX ADMIN — FENTANYL CITRATE 50 MCG: 0.05 INJECTION, SOLUTION INTRAMUSCULAR; INTRAVENOUS at 16:35

## 2023-03-06 RX ADMIN — SODIUM CHLORIDE 1000 ML: 9 INJECTION, SOLUTION INTRAVENOUS at 16:33

## 2023-03-06 RX ADMIN — IOPAMIDOL 70 ML: 755 INJECTION, SOLUTION INTRAVENOUS at 18:19

## 2023-03-06 RX ADMIN — ONDANSETRON 4 MG: 2 INJECTION INTRAMUSCULAR; INTRAVENOUS at 16:34

## 2023-03-06 ASSESSMENT — PAIN SCALES - GENERAL
PAINLEVEL_OUTOF10: 9
PAINLEVEL_OUTOF10: 7

## 2023-03-06 ASSESSMENT — PAIN DESCRIPTION - ORIENTATION: ORIENTATION: MID

## 2023-03-06 ASSESSMENT — PAIN DESCRIPTION - LOCATION: LOCATION: ABDOMEN

## 2023-03-06 ASSESSMENT — PAIN - FUNCTIONAL ASSESSMENT: PAIN_FUNCTIONAL_ASSESSMENT: 0-10

## 2023-03-06 ASSESSMENT — LIFESTYLE VARIABLES: HOW MANY STANDARD DRINKS CONTAINING ALCOHOL DO YOU HAVE ON A TYPICAL DAY: PATIENT DOES NOT DRINK

## 2023-03-06 NOTE — ED PROVIDER NOTES
Independent JG Visit. [facility]  Department of Emergency Medicine   ED  Encounter Note  Admit Date/RoomTime: 3/6/2023  3:43 PM  ED Room:     NAME: Mundo Vinson  : 1960  MRN: [id number]     Chief Complaint:  Nausea and Abdominal Pain (ACROSS MID ABD / TODAY BURNING FREQ URINATION/ SWEATS X 1 WEEK)    History of Present Illness       Mundo Vinson is a 58 y.o. old female who presents to the emergency department by private vehicle, for abdominal pain, nausea for 1 week. Patient states that she has epigastric and lower abdominal pain. It does not radiate anywhere. She denies any chest pain or shortness of breath. Patient complains of nausea but has not vomited. She reports diarrhea. She states that she has chronic diarrhea and is due for a colonoscopy. She states that she was supposed to have her colonoscopy done today but she felt so poorly that she came here instead. She denies any black tarry stools or bright red blood in the stool. She reports dysuria and hematuria for 1 week. She also complains sweating sensation. It happens frequently throughout the day. She has had no fevers that she is aware of. She states that she randomly breaks out into a sweat. Nothing makes her symptoms better or worse. ROS   Pertinent positives and negatives are stated within HPI, all other systems reviewed and are negative. Past Medical History:  has a past medical history of Arthritis, Bipolar 1 disorder ([address] [address] Chronic fatigue, COPD (chronic obstructive pulmonary disease) ([facility] [address] Depression, Fibromyalgia, GERD (gastroesophageal reflux disease), Migraines, Neuropathy, and Short-term memory loss. Surgical History has a past surgical history that includes Hysterectomy; Cholecystectomy; sinus surgery; Colonoscopy; Endoscopy, colon, diagnostic; cardiovascular stress test (2021); esophageal motility study (N/A, 2021);  Shoulder arthroscopy (Left, 8/10/2021); and Nerve Block (Left, 3/1/2023). Social History:  reports that she has been smoking cigarettes. She has a 44.00 pack-year smoking history. She has never used smokeless tobacco. She reports that she does not drink alcohol and does not use drugs. Family History: family history is not on file. Allergies: Erythromycin    Physical Exam   Oxygen Saturation Interpretation: Normal on room air analysis. ED Triage Vitals   BP Temp Temp src Heart Rate Resp SpO2 Height Weight   03/06/23 1517 03/06/23 1449 -- 03/06/23 1449 -- 03/06/23 1449 -- 03/06/23 1517   128/78 97.8 °F (36.6 °C)  (!) 124  98 %  125 lb (56.7 kg)        Physical Exam  General Appearance/Constitutional:  Alert, development consistent with age. HEENT:  NC/NT. PERRLA. Airway patent. Neck:  Supple. No lymphadenopathy. Respiratory:  No retractions. Lungs Clear to auscultation and breath sounds equal.  CV:  Regular rate and rhythm. GI:  normal appearing, non-distended with no visible hernias. Bowel sounds: normal bowel sounds. Tenderness: There is mild tenderness present - located in the epigastrium and diffusely in the upper abdomen., There is no rebound tenderness. , There is no guarding. .        Liver: non-tender. Spleen:  non-tender. Back: CVA Tenderness: No CVA tenderness. : /Pelvic examination deferred / declined. Integument:  Normal turgor. Warm, dry, without visible rash, unless noted elsewhere. Lymphatics: No edema, cap.refill <3sec. Neurological:  Orientation age-appropriate. Motor functions intact.     Lab / Imaging Results   (All laboratory and radiology results have been personally reviewed by myself)  Labs:  Results for orders placed or performed during the hospital encounter of 03/06/23   Urinalysis with Microscopic   Result Value Ref Range    Color, UA Yellow Straw/Yellow    Clarity, UA Clear Clear    Glucose, Ur Negative Negative mg/dL    Bilirubin Urine SMALL (A) Negative    Ketones, Urine >=80 (A) Negative mg/dL    Specific Gravity, UA >=1.030 1.005 - 1.030    Blood, Urine MODERATE (A) Negative    pH, UA 6.0 5.0 - 9.0    Protein, UA Negative Negative mg/dL    Urobilinogen, Urine 1.0 <2.0 E.U./dL    Nitrite, Urine Negative Negative    Leukocyte Esterase, Urine Negative Negative    WBC, UA 1-3 0 - 5 /HPF    RBC, UA 0-1 0 - 2 /HPF    Epithelial Cells, UA RARE /HPF    Bacteria, UA RARE (A) None Seen /HPF   CBC with Auto Differential   Result Value Ref Range    WBC 7.3 4.5 - 11.5 E9/L    RBC 5.30 3.50 - 5.50 E12/L    Hemoglobin 15.7 (H) 11.5 - 15.5 g/dL    Hematocrit 47.7 34.0 - 48.0 %    MCV 90.0 80.0 - 99.9 fL    MCH 29.6 26.0 - 35.0 pg    MCHC 32.9 32.0 - 34.5 %    RDW 13.9 11.5 - 15.0 fL    Platelets 241 801 - 510 E9/L    MPV 11.2 7.0 - 12.0 fL    Neutrophils % 78.0 43.0 - 80.0 %    Immature Granulocytes % 0.4 0.0 - 5.0 %    Lymphocytes % 14.4 (L) 20.0 - 42.0 %    Monocytes % 6.4 2.0 - 12.0 %    Eosinophils % 0.4 0.0 - 6.0 %    Basophils % 0.4 0.0 - 2.0 %    Neutrophils Absolute 5.69 1.80 - 7.30 E9/L    Immature Granulocytes # 0.03 E9/L    Lymphocytes Absolute 1.05 (L) 1.50 - 4.00 E9/L    Monocytes Absolute 0.47 0.10 - 0.95 E9/L    Eosinophils Absolute 0.03 (L) 0.05 - 0.50 E9/L    Basophils Absolute 0.03 0.00 - 0.20 E9/L   Comprehensive Metabolic Panel   Result Value Ref Range    Sodium 139 132 - 146 mmol/L    Potassium 5.1 (H) 3.5 - 5.0 mmol/L    Chloride 104 98 - 107 mmol/L    CO2 24 22 - 29 mmol/L    Anion Gap 11 7 - 16 mmol/L    Glucose 111 (H) 74 - 99 mg/dL    BUN 12 6 - 23 mg/dL    Creatinine 0.8 0.5 - 1.0 mg/dL    Est, Glom Filt Rate >60 >=60 mL/min/1.73    Calcium 9.8 8.6 - 10.2 mg/dL    Total Protein 7.4 6.4 - 8.3 g/dL    Albumin 4.4 3.5 - 5.2 g/dL    Total Bilirubin 0.6 0.0 - 1.2 mg/dL    Alkaline Phosphatase 75 35 - 104 U/L    ALT 9 0 - 32 U/L    AST 10 0 - 31 U/L   Lactic Acid   Result Value Ref Range    Lactic Acid 1.1 0.5 - 2.2 mmol/L   Troponin   Result Value Ref Range Troponin, High Sensitivity 7 0 - 9 ng/L   Lipase   Result Value Ref Range    Lipase 15 13 - 60 U/L   EKG 12 Lead   Result Value Ref Range    Ventricular Rate 79 BPM    Atrial Rate 79 BPM    P-R Interval 126 ms    QRS Duration 74 ms    Q-T Interval 394 ms    QTc Calculation (Bazett) 451 ms    P Axis 77 degrees    R Axis -81 degrees    T Axis 71 degrees     Imaging: All Radiology results interpreted by Radiologist unless otherwise noted. CT ABDOMEN PELVIS W IV CONTRAST Additional Contrast? None   Final Result   No definitive findings to explain the patient's symptoms. Diffuse urinary bladder wall thickening. ED Course / Medical Decision Making     Medications   fentaNYL (SUBLIMAZE) injection 50 mcg (50 mcg IntraVENous Given 3/6/23 1635)   ondansetron (ZOFRAN) injection 4 mg (4 mg IntraVENous Given 3/6/23 1634)   0.9 % sodium chloride bolus (0 mLs IntraVENous Stopped 3/6/23 1812)   iopamidol (ISOVUE-370) 76 % injection 70 mL (70 mLs IntraVENous Given 3/6/23 1819)        EKG #1:  Interpreted by emergency department attending physician unless otherwise noted. 3/6/23  Time: 1625    Rhythm: normal sinus   Rate: 79 bpm. No ST segment elevation or depression. TX int is 126 ms, QRS duration is 74 ms. No acute changes from EKG done on 1/7/23. Re-Evaluations:  3/6/23      Time: 1810    Patient signed out Rebecca Lan. Consultations:             None    Procedures:   none    MDM:  Prieto Carlson is a 58 y.o. old female who presents to the emergency department by private vehicle, for abdominal pain, nausea for 1 week. Patient states that she has epigastric and lower abdominal pain. It does not radiate anywhere. She denies any chest pain or shortness of breath. Patient complains of nausea but has not vomited. She reports diarrhea. She states that she has chronic diarrhea and is due for a colonoscopy.   She states that she was supposed to have her colonoscopy done today but she felt so poorly that she came here instead. She denies any black tarry stools or bright red blood in the stool. She reports dysuria and hematuria for 1 week. She also complains sweating sensation. It happens frequently throughout the day. She has had no fevers that she is aware of. She states that she randomly breaks out into a sweat. Nothing makes her symptoms better or worse. Urinalysis shows small bilirubin, greater than 80 ketones, moderate blood, rare bacteria. CBC shows hemoglobin of 15.7, otherwise unremarkable. Lactic acid, lipase is unremarkable. Troponin is found to be 7. CMP shows potassium 5.1, glucose of 111, otherwise unremarkable. Plan of Care/Counseling:  BRANDEN Joe reviewed today's visit with the patient in addition to providing specific details for the plan of care and counseling regarding the diagnosis and prognosis. Questions are answered at this time and are agreeable with the plan. Assessment      1. Abdominal pain, epigastric    2. Dehydration    3. Hematuria, unspecified type         Plan   Discharged home  Patient condition is good. New Medications     Discharge Medication List as of 3/6/2023  7:26 PM        START taking these medications    Details   dicyclomine (BENTYL) 10 MG capsule Take 1 capsule by mouth 4 times daily, Disp-30 capsule, R-0Normal      sucralfate (CARAFATE) 1 GM tablet Take 1 tablet by mouth 4 times daily for 3 days, Disp-12 tablet, R-0Normal      promethazine (PHENERGAN) 25 MG tablet Take 1 tablet by mouth every 6 hours as needed for Nausea, Disp-12 tablet, R-0Normal           Electronically signed by BRANDEN Epstein   DD: 3/6/23  **This report was transcribed using voice recognition software. Every effort was made to ensure accuracy; however, inadvertent computerized transcription errors may be present.   END OF PROVIDER NOTE       Susy Sin, [address]  03/06/23 2059

## 2023-03-06 NOTE — Clinical Note
Yinaeula Justo was seen and treated in our emergency department on 3/6/2023. She may return to work on 03/08/2023. If you have any questions or concerns, please don't hesitate to call.       Sol Comment, PA

## 2023-03-08 LAB
EKG ATRIAL RATE: 79 BPM
EKG P AXIS: 77 DEGREES
EKG P-R INTERVAL: 126 MS
EKG Q-T INTERVAL: 394 MS
EKG QRS DURATION: 74 MS
EKG QTC CALCULATION (BAZETT): 451 MS
EKG R AXIS: -81 DEGREES
EKG T AXIS: 71 DEGREES
EKG VENTRICULAR RATE: 79 BPM

## 2023-03-08 PROCEDURE — 93010 ELECTROCARDIOGRAM REPORT: CPT | Performed by: INTERNAL MEDICINE

## 2023-04-03 RX ORDER — IBUPROFEN 800 MG/1
800 TABLET ORAL EVERY 8 HOURS PRN
Qty: 90 TABLET | Refills: 2 | Status: SHIPPED | OUTPATIENT
Start: 2023-04-03 | End: 2023-05-03

## 2023-07-11 ENCOUNTER — TELEPHONE (OUTPATIENT)
Dept: NEUROSURGERY | Age: 63
End: 2023-07-11

## 2023-07-11 NOTE — TELEPHONE ENCOUNTER
Patient wanted us to fax EMG order to [facility].   I called to see exactly what they needed and where to fax. They faxed me over a form to fill out with our order plus it listed what they need to have sent as well.     I faxed Emg sheet, order, and the things listed to     [phone]

## 2023-07-17 DIAGNOSIS — M75.42 IMPINGEMENT SYNDROME OF LEFT SHOULDER: Primary | ICD-10-CM

## 2023-07-17 RX ORDER — IBUPROFEN 800 MG/1
800 TABLET ORAL EVERY 8 HOURS PRN
Qty: 90 TABLET | Refills: 2 | Status: SHIPPED | OUTPATIENT
Start: 2023-07-17 | End: 2023-08-16

## 2023-07-17 NOTE — TELEPHONE ENCOUNTER
Patient's chart was reviewed including pertinent information including allergies, current medications, medical conditions, last exam and office notes, available musculoskeletal imaging, most recent labs and other physician and provider notes. In 6-minute chart review, the most appropriate medication/treatment was ordered. In addition, patient did not/ does not have any scheduled office visits within 7 days of this encounter. Patient was educated to monitor for adverse reactions and to notify office of any issues. Екатерина Lr was seen today for medication refill.     Diagnoses and all orders for this visit:    Impingement syndrome of left shoulder    Other orders  -     ibuprofen (ADVIL;MOTRIN) 800 MG tablet; TAKE 1 TABLET BY MOUTH EVERY 8 HOURS AS NEEDED FOR PAIN

## 2023-07-18 DIAGNOSIS — M54.2 CERVICALGIA: ICD-10-CM

## 2023-07-18 DIAGNOSIS — M54.10 RADICULOPATHY, UNSPECIFIED SPINAL REGION: ICD-10-CM

## 2023-08-07 DIAGNOSIS — M54.2 CERVICALGIA: Primary | ICD-10-CM

## 2023-08-14 ENCOUNTER — TRANSCRIBE ORDERS (OUTPATIENT)
Dept: ADMINISTRATIVE | Age: 63
End: 2023-08-14

## 2023-08-14 DIAGNOSIS — R13.19 DYSPHAGIA, NEUROLOGIC: Primary | ICD-10-CM

## 2023-09-05 ENCOUNTER — HOSPITAL ENCOUNTER (OUTPATIENT)
Age: 63
Discharge: HOME | End: 2023-09-07
Payer: MEDICARE

## 2023-09-05 ENCOUNTER — TELEPHONE (OUTPATIENT)
Dept: ADMINISTRATIVE | Age: 63
End: 2023-09-05

## 2023-09-05 ENCOUNTER — OFFICE VISIT (OUTPATIENT)
Dept: NEUROSURGERY | Age: 63
End: 2023-09-05
Payer: MEDICARE

## 2023-09-05 ENCOUNTER — HOSPITAL ENCOUNTER (OUTPATIENT)
Dept: GENERAL RADIOLOGY | Age: 63
Discharge: HOME | End: 2023-09-07
Payer: MEDICARE

## 2023-09-05 VITALS
HEIGHT: 63 IN | HEART RATE: 78 BPM | WEIGHT: 125 LBS | DIASTOLIC BLOOD PRESSURE: 78 MMHG | SYSTOLIC BLOOD PRESSURE: 128 MMHG | OXYGEN SATURATION: 98 % | BODY MASS INDEX: 22.15 KG/M2 | TEMPERATURE: 97 F | RESPIRATION RATE: 18 BRPM

## 2023-09-05 DIAGNOSIS — M54.2 NECK PAIN: Primary | ICD-10-CM

## 2023-09-05 DIAGNOSIS — M54.2 CERVICALGIA: ICD-10-CM

## 2023-09-05 DIAGNOSIS — G56.22 ULNAR NEUROPATHY OF LEFT UPPER EXTREMITY: ICD-10-CM

## 2023-09-05 PROCEDURE — 3017F COLORECTAL CA SCREEN DOC REV: CPT | Performed by: STUDENT IN AN ORGANIZED HEALTH CARE EDUCATION/TRAINING PROGRAM

## 2023-09-05 PROCEDURE — 4004F PT TOBACCO SCREEN RCVD TLK: CPT | Performed by: STUDENT IN AN ORGANIZED HEALTH CARE EDUCATION/TRAINING PROGRAM

## 2023-09-05 PROCEDURE — 99212 OFFICE O/P EST SF 10 MIN: CPT

## 2023-09-05 PROCEDURE — 72040 X-RAY EXAM NECK SPINE 2-3 VW: CPT

## 2023-09-05 PROCEDURE — G8427 DOCREV CUR MEDS BY ELIG CLIN: HCPCS | Performed by: STUDENT IN AN ORGANIZED HEALTH CARE EDUCATION/TRAINING PROGRAM

## 2023-09-05 PROCEDURE — G8420 CALC BMI NORM PARAMETERS: HCPCS | Performed by: STUDENT IN AN ORGANIZED HEALTH CARE EDUCATION/TRAINING PROGRAM

## 2023-09-05 PROCEDURE — 99213 OFFICE O/P EST LOW 20 MIN: CPT | Performed by: STUDENT IN AN ORGANIZED HEALTH CARE EDUCATION/TRAINING PROGRAM

## 2023-09-05 RX ORDER — MIRTAZAPINE 15 MG/1
TABLET, FILM COATED ORAL
COMMUNITY
Start: 2023-09-02

## 2023-09-05 NOTE — TELEPHONE ENCOUNTER
Pt would like to speak to the office as she just spoke to the Radiology department and they are asking for some reports from [address] [place] [address].

## 2023-09-05 NOTE — PROGRESS NOTES
Problem Focused Office Visit     Subjective: Ermias Fernandes is a 58 y.o.  female who presents for office follow up/review results. Patient states she has been having neck pain and left arm pain. Arm pain is worse than neck pain. She describes this arm pain has sharp. She denies any numbness or weakness associated with this pain. She denies any alleviating or aggravating factors. She has tried ibuprofen and lyrica with no relief. She has tried PT at [facility] with no relief. She has also tried MICHELE with Dr. Aly Mckeon with no relief. She is a current smoker, smoking 1 ppd  and denies any blood thinner usage. EMG and XR reviewed with patient. Physical Exam  HENT:      Head: Normocephalic. Eyes:      Pupils: Pupils are equal, round, and reactive to light. Cardiovascular:      Rate and Rhythm: Normal rate. Pulmonary:      Effort: Pulmonary effort is normal.   Abdominal:      General: There is no distension. Musculoskeletal:         General: Normal range of motion. Cervical back: Normal range of motion. Skin:     General: Skin is warm and dry. Neurological:      Mental Status: She is alert. Comments: A&Ox3  CN3-12 intact  Left Triceps/Biceps 4/5 secondary to pain otherwise Motor Strength full   Sensation intact to light touch   Reflexes normal   (-)Hoffmans   Psychiatric:         Thought Content: Thought content normal.                EM2023 EMG BUE       Assessment: This is a 58 y.o.  female presenting for office follow up/review results. Patient still with neck pain and left arm pain, arm pain worse than neck pain. She has tried PT and MICHELE for this pain with no relief. EMG as above.       Plan:  -Pain control and expectations discussed  -EMG reviewed and discussed in detail, will follow up with ortho for ulnar neuropathy  -Patient wishes to see ortho for ulnar neuropathy before discussion of surgical options for neck, patient will need updated MRI Cervical Spine when she would like to

## 2023-09-05 NOTE — TELEPHONE ENCOUNTER
Patient was advised that Dr. Carolina Dickinson was going to send a referral to department in regards to damaged Ulnar nerve, left. . imaging in chart 9/5/23. Stated she would also like Dr. Tayo Magallanes to look at her left shoulder again .  LOV 2022    Please advise if okay to schedule No new referral in chart currently

## 2023-09-09 NOTE — PROGRESS NOTES
Chief Complaint   Patient presents with    Shoulder Pain     Left Shoulder/Upper Arm. F/U, was sent back to evaluate the arm from Neurosurgery          HPI:    Patient is 58 y. o.female presents today for follow-up of left shoulder pain. Patient reports Left shoulder DOS 8/10/21. Shoulder was doing well but over the last couple of weeks it has been a little painful again. Still doing exercises with bands she got from PT. Previous treatments include rest, ice, heat, NSAIDs, HEP without much relief. No follow-up today, patient states that she had good relief from subacromial cortisone injection but she also states that her pain is now all over her arm and shoots down into her hand. Follows up after. ROS:    Skin: (-) rash,(-) psoriasis,(-) eczema, (-)skin cancer. Neurologic: (-)numbness, (-)tingling, (-)headaches, (-) LOC. Cardiovascular: (-) Chest pain, (-) swelling in legs/feet, (-) SOB, (-) cramping in legs/feet with walking.     All other review of systems negative except stated above or in HPI      Past Medical History:   Diagnosis Date    Arthritis     Bipolar 1 disorder (HCC)     Chronic fatigue     COPD (chronic obstructive pulmonary disease) (HCC)     Depression     Fibromyalgia     GERD (gastroesophageal reflux disease)     Migraines     Neuropathy     Short-term memory loss      Past Surgical History:   Procedure Laterality Date    CARDIOVASCULAR STRESS TEST  02/12/2021    Lexiscan stress test    CHOLECYSTECTOMY      COLONOSCOPY      ENDOSCOPY, COLON, DIAGNOSTIC      ESOPHAGEAL MOTILITY STUDY N/A 5/12/2021    ESOPHAGEAL MOTILITY/MANOMETRY STUDY performed by Yuri Johnson DO at [address] (CERVIX STATUS UNKNOWN)      NERVE BLOCK Left 3/1/2023    LEFT CERVICAL TRANSFORAMINAL EPIDURAL STEROID INJECTION AT C4-5, C5-6 UNDER X-RAY (CPT 16360) performed by Jessica Kwon DO at [facility] ARTHROSCOPY Left 8/10/2021    LEFT SHOULDER ARTHROSCOPY, SUBACROMIAL

## 2023-09-13 ENCOUNTER — OFFICE VISIT (OUTPATIENT)
Dept: ORTHOPEDIC SURGERY | Age: 63
End: 2023-09-13

## 2023-09-13 VITALS — HEIGHT: 63 IN | WEIGHT: 125 LBS | BODY MASS INDEX: 22.15 KG/M2 | TEMPERATURE: 98 F

## 2023-09-13 DIAGNOSIS — Z71.82 EXERCISE COUNSELING: ICD-10-CM

## 2023-09-13 DIAGNOSIS — G25.89 SCAPULAR DYSKINESIS: ICD-10-CM

## 2023-09-13 DIAGNOSIS — M54.12 CERVICAL RADICULOPATHY: ICD-10-CM

## 2023-09-13 DIAGNOSIS — M75.42 IMPINGEMENT SYNDROME OF LEFT SHOULDER: Primary | ICD-10-CM

## 2023-09-13 DIAGNOSIS — M19.012 OSTEOARTHRITIS OF GLENOHUMERAL JOINT, LEFT: ICD-10-CM

## 2023-09-13 RX ORDER — TRIAMCINOLONE ACETONIDE 40 MG/ML
40 INJECTION, SUSPENSION INTRA-ARTICULAR; INTRAMUSCULAR ONCE
Status: COMPLETED | OUTPATIENT
Start: 2023-09-13 | End: 2023-09-13

## 2023-09-13 RX ADMIN — TRIAMCINOLONE ACETONIDE 40 MG: 40 INJECTION, SUSPENSION INTRA-ARTICULAR; INTRAMUSCULAR at 12:54

## 2023-09-29 DIAGNOSIS — M19.012 OSTEOARTHRITIS OF GLENOHUMERAL JOINT, LEFT: ICD-10-CM

## 2023-09-29 DIAGNOSIS — M75.42 IMPINGEMENT SYNDROME OF LEFT SHOULDER: Primary | ICD-10-CM

## 2023-09-29 PROCEDURE — 99421 OL DIG E/M SVC 5-10 MIN: CPT | Performed by: ORTHOPAEDIC SURGERY

## 2023-10-06 RX ORDER — IBUPROFEN 800 MG/1
800 TABLET ORAL EVERY 8 HOURS PRN
Qty: 90 TABLET | Refills: 2 | Status: SHIPPED | OUTPATIENT
Start: 2023-10-06 | End: 2023-11-05

## 2023-10-06 NOTE — TELEPHONE ENCOUNTER
Patient's chart was reviewed including pertinent information including allergies, current medications, medical conditions, last exam and office notes, available musculoskeletal imaging, most recent labs and other physician and provider notes. In 6-minute chart review, the most appropriate medication/treatment was ordered. In addition, patient did not/ does not have any scheduled office visits within 7 days of this encounter. Patient was educated to monitor for adverse reactions and to notify office of any issues. [facility] was seen today for medication refill.     Diagnoses and all orders for this visit:    Impingement syndrome of left shoulder    Osteoarthritis of glenohumeral joint, left    Other orders  -     ibuprofen (ADVIL;MOTRIN) 800 MG tablet; TAKE 1 TABLET BY MOUTH EVERY 8 HOURS AS NEEDED FOR PAIN

## 2024-01-24 RX ORDER — IBUPROFEN 800 MG/1
800 TABLET ORAL EVERY 8 HOURS PRN
Qty: 90 TABLET | Refills: 2 | Status: SHIPPED | OUTPATIENT
Start: 2024-01-24 | End: 2024-02-23

## 2024-01-31 NOTE — PROGRESS NOTES
Physical Therapy Daily Treatment Note    Date: 10/19/2021  Patient Name: Leslye Moseley   [facility]   : 1960   MRN: [id number]  DOInjury: 1 year  DOSx: 8/10/21            1. Left shoulder arthroscopic long head biceps tenodesis     2. Left shoulder arthroscopic Subacromial decompression     3.  Left shoulder arthroscopic distal clavicle resection     4.  Left shoulder extensive debridement   Referring Provider:  Dora Hu DO    Medical Diagnosis:      Diagnosis Orders   1. Rupture of left biceps tendon, initial encounter       Access Code: [email or web address]  URL: [email or web address]  Date: 10/19/2021  Prepared by: Alexander Fairchild    Exercises  Standing Row with Anchored Resistance - 3-4 x weekly - 2 sets - 15 reps  Standing High Row with Anchored Resistance - 3-4 x weekly - 2 sets - 15 reps  Shoulder Internal Rotation with Resistance - 1 x daily - 3-4 x weekly - 2-3 sets - 15-20 reps  Sidelying Shoulder External Rotation - 3-4 x weekly - 3 sets - 10 reps  Supine Shoulder Flexion Extension Full Range AROM - 3-4 x weekly - 3 sets - 10 reps    Outcome Measure: Quick Dash: 41% Impairment    S: Pt reports shoulder is doing pretty good. States right arm hurts more than left  O: Pt given written HEP  Time 4623-0480     Visit  Repeat outcome measure at mid point and end.     Pain 4/10     ROM AROM: 170° Forward elevation,  90° ER,  IR to T7   10/13/21    Modalities            Manual  NEXT                Stretch      Table slides  NEXT    Wall Walk Flex      Wall Walk ABD      Wall Pec/ER Stretch      Wall ER Stretch      Towel IR Stretch      Cross Body Adduction      Supine Stretch with Arms Behind Head            Exercise      UBE          Pulleys - Flex 3 mins     Pulleys - IR      Supine Wand Flex      Supine Wand Bench Press 2 x 15        Supine Wand ER/IR  Needs VC for technique throughout    Standing Wand IR     Standing Wand Scaption     Standing Wand Flex      Standing Wand Shoulder Press      Standing Not applicable

## 2024-03-07 DIAGNOSIS — M75.42 IMPINGEMENT SYNDROME OF LEFT SHOULDER: Primary | ICD-10-CM

## 2024-03-07 DIAGNOSIS — M19.012 OSTEOARTHRITIS OF GLENOHUMERAL JOINT, LEFT: ICD-10-CM

## 2024-03-20 ENCOUNTER — OFFICE VISIT (OUTPATIENT)
Dept: ORTHOPEDIC SURGERY | Age: 64
End: 2024-03-20
Payer: MEDICARE

## 2024-03-20 ENCOUNTER — TELEPHONE (OUTPATIENT)
Dept: PHYSICAL MEDICINE AND REHAB | Age: 64
End: 2024-03-20

## 2024-03-20 ENCOUNTER — TELEPHONE (OUTPATIENT)
Dept: ORTHOPEDIC SURGERY | Age: 64
End: 2024-03-20

## 2024-03-20 VITALS — WEIGHT: 125 LBS | TEMPERATURE: 98 F | BODY MASS INDEX: 22.15 KG/M2 | HEIGHT: 63 IN

## 2024-03-20 DIAGNOSIS — M25.812 IMPINGEMENT OF LEFT SHOULDER: ICD-10-CM

## 2024-03-20 DIAGNOSIS — M19.012 PRIMARY OSTEOARTHRITIS OF LEFT SHOULDER: Primary | ICD-10-CM

## 2024-03-20 DIAGNOSIS — G56.22 ULNAR NEUROPATHY AT ELBOW, LEFT: ICD-10-CM

## 2024-03-20 DIAGNOSIS — M25.512 CHRONIC LEFT SHOULDER PAIN: ICD-10-CM

## 2024-03-20 DIAGNOSIS — G89.29 CHRONIC LEFT SHOULDER PAIN: ICD-10-CM

## 2024-03-20 PROCEDURE — 99214 OFFICE O/P EST MOD 30 MIN: CPT | Performed by: ORTHOPAEDIC SURGERY

## 2024-03-20 RX ORDER — LIDOCAINE 50 MG/G
1 PATCH TOPICAL DAILY
Qty: 10 PATCH | Refills: 0 | Status: SHIPPED | OUTPATIENT
Start: 2024-03-20 | End: 2024-03-30

## 2024-03-20 ASSESSMENT — ENCOUNTER SYMPTOMS
SHORTNESS OF BREATH: 0
ALLERGIC/IMMUNOLOGIC NEGATIVE: 1
EYE DISCHARGE: 0
ABDOMINAL DISTENTION: 0

## 2024-03-20 NOTE — PROGRESS NOTES
difficulty with overhead activities.  She rates her pain 8 out of 10.  Here today to discuss further treatment options.    Past Medical History:   Diagnosis Date    Arthritis     Bipolar 1 disorder (HCC)     Chronic fatigue     COPD (chronic obstructive pulmonary disease) (HCC)     Depression     Fibromyalgia     GERD (gastroesophageal reflux disease)     Migraines     Neuropathy     Short-term memory loss      Past Surgical History:   Procedure Laterality Date    CARDIOVASCULAR STRESS TEST  02/12/2021    Lexiscan stress test    CHOLECYSTECTOMY      COLONOSCOPY      ENDOSCOPY, COLON, DIAGNOSTIC      ESOPHAGEAL MOTILITY STUDY N/A 5/12/2021    ESOPHAGEAL MOTILITY/MANOMETRY STUDY performed by Hi Finch DO at [facility] ENDOSCOPY    HYSTERECTOMY (CERVIX STATUS UNKNOWN)      NERVE BLOCK Left 3/1/2023    LEFT CERVICAL TRANSFORAMINAL EPIDURAL STEROID INJECTION AT C4-5, C5-6 UNDER X-RAY (CPT 92891) performed by Gladys Fall DO at [facility] OR    SHOULDER ARTHROSCOPY Left 8/10/2021    LEFT SHOULDER ARTHROSCOPY, SUBACROMIAL DECOMPRESSION, DEBRIDEMENT (ARTHREX) performed by Vince Becerra DO at [facility] OR    SINUS SURGERY        History reviewed. No pertinent family history.   Social History     Tobacco Use    Smoking status: Every Day     Current packs/day: 1.00     Average packs/day: 1 pack/day for 44.0 years (44.0 ttl pk-yrs)     Types: Cigarettes    Smokeless tobacco: Never   Vaping Use    Vaping Use: Never used   Substance Use Topics    Alcohol use: No    Drug use: No        Review of Systems   Constitutional:  Positive for activity change.   HENT:  Negative for congestion.    Eyes:  Negative for discharge.   Respiratory:  Negative for shortness of breath.    Cardiovascular:  Negative for chest pain.   Gastrointestinal:  Negative for abdominal distention.   Endocrine: Negative.    Genitourinary: Negative.    Musculoskeletal:  Positive for arthralgias and joint swelling.   Skin:  Negative for wound.

## 2024-03-20 NOTE — TELEPHONE ENCOUNTER
Pt returned your call to schedule appt.  Staff was busy due to pt. Care.  Pt would like a r/c when possible.

## 2024-03-20 NOTE — TELEPHONE ENCOUNTER
Called and left message to call back to schedule injection per referral of Dr. Hardy, instructed a call back

## 2024-03-20 NOTE — TELEPHONE ENCOUNTER
Pt said that Dr Hardy had referred her to Dr Lackey, and she received a call from office that she was discharged from that practice and is unable to schedule with them.  She is requesting a new referral to a different office/provider.

## 2024-03-21 ENCOUNTER — PROCEDURE VISIT (OUTPATIENT)
Dept: PHYSICAL MEDICINE AND REHAB | Age: 64
End: 2024-03-21

## 2024-03-21 VITALS — WEIGHT: 135 LBS | BODY MASS INDEX: 23.92 KG/M2 | HEIGHT: 63 IN

## 2024-03-21 DIAGNOSIS — M19.012 GLENOHUMERAL ARTHRITIS, LEFT: ICD-10-CM

## 2024-03-21 DIAGNOSIS — G56.22 CUBITAL TUNNEL SYNDROME ON LEFT: ICD-10-CM

## 2024-03-21 DIAGNOSIS — G56.02 CARPAL TUNNEL SYNDROME OF LEFT WRIST: Primary | ICD-10-CM

## 2024-03-21 DIAGNOSIS — M79.609 PAIN IN EXTREMITY, UNSPECIFIED EXTREMITY: Primary | ICD-10-CM

## 2024-03-21 DIAGNOSIS — G56.02 CARPAL TUNNEL SYNDROME OF LEFT WRIST: ICD-10-CM

## 2024-03-21 RX ORDER — LIDOCAINE HYDROCHLORIDE 10 MG/ML
8 INJECTION, SOLUTION EPIDURAL; INFILTRATION; INTRACAUDAL; PERINEURAL ONCE
Status: COMPLETED | OUTPATIENT
Start: 2024-03-21 | End: 2024-03-21

## 2024-03-21 RX ORDER — TRIAMCINOLONE ACETONIDE 40 MG/ML
40 INJECTION, SUSPENSION INTRA-ARTICULAR; INTRAMUSCULAR ONCE
Status: COMPLETED | OUTPATIENT
Start: 2024-03-21 | End: 2024-03-21

## 2024-03-21 RX ADMIN — TRIAMCINOLONE ACETONIDE 40 MG: 40 INJECTION, SUSPENSION INTRA-ARTICULAR; INTRAMUSCULAR at 11:29

## 2024-03-21 RX ADMIN — LIDOCAINE HYDROCHLORIDE 8 ML: 10 INJECTION, SOLUTION EPIDURAL; INFILTRATION; INTRACAUDAL; PERINEURAL at 11:29

## 2024-03-21 NOTE — PROGRESS NOTES
Kala Lackey D.O.  [facility]  [address] [place] [address]  Phone: [phone]  Fax: [phone]    3/21/2024    Chief Complaint   Patient presents with    Extremity Pain     Pain in left upper arm described as sharp.  Pain rated 10/10.  Symptoms for 1 year.     Numbness     none    Extremity Weakness     Left arm weakness.        Last injection: n/a  Taking anticoagulants/antiplatelets: No  Diabetic: No  Febrile/active infection: No    Ambulatory Procedure Time Out  Correct Patient: Yes  Correct Procedure: Yes  Correct Site/Side: Yes  Correct Site(s) Marked: Yes  Informed Consent Signed: Yes  Allergies Verified: Yes  Staff Present & Credential:: Trista Langford LPN, Dr. Kala Lackey D.O.    Diagnosis:  1. Pain in extremity, unspecified extremity    2. Glenohumeral arthritis, left        After explaining the indications, risks, benefits and alternatives of a left shoulder joint injection, the patient agreed to proceed. A permit was signed and scanned to the media.The patient was placed in the  seated position.   The skin was prepared with chloraprep. Ethyl Chloride vapocoolant spray was used for local anesthesia.  Using an aseptic, no touch technique, a 22 gauge, 1.5\" needle with 1 cc of Kenalog 40mg/cc and 8 cc of Xylocaine 1% was directed to the glenohumeral joint using ultrasound guidance.  After negative aspiration, the medication was injected. Adequate hemostasis was achieved and the injection site was covered with a bandage. The patient was observed for complication and left the office without incident.  The patient tolerated the procedure well and was educated in post injection care.  There was post-injection reduction in pain. Ultrasound images are scanned in the electronic medical record separately.      Kala Lackey D.O., P.T.  Board Certified Physical Medicine and Rehabilitation  Board Certified Electrodiagnostic Medicine    Administrations 
12.0 Palm - APB   32      Wrist APB 3.23 9.1 Wrist - Palm 8 53 32      Elbow APB 6.93 8.9 Elbow - Wrist 20 54 32   L Ulnar - ADM      Wrist ADM 3.02 10.3 Wrist - ADM 8  32      B.Elbow ADM 6.35 9.5 B.Elbow - Wrist 19 57 32      A.Elbow ADM 8.33 9.2 A.Elbow - B.Elbow 10 51 32   L Ulnar - FDI      Wrist FDI 3.54 10.7 Wrist - FDI   32      B.Elbow FDI 7.24 9.1 B.Elbow - Wrist 19 51 32      A.Elbow FDI 9.17 8.1 A.Elbow - B.Elbow 10 52 32       F Wave      Nerve Fmin % F    ms %   L Median - APB 25.78 60   L Ulnar - ADM 26.46 70       EMG      EMG Summary Table     Spontaneous MUAP Recruitment   Muscle Nerve Roots IA Fib PSW Fasc Amp Dur. PPP Pattern   L. Biceps brachii Musculocutaneous C5-C6 N None None None N N N N   L. Triceps brachii Radial C6-C8 N None None None N N N N   L. Pronator teres Median C6-C7 N None None None N N N N   L. First dorsal interosseous Ulnar C8-T1 N None None None N N N N   L. Flexor digitorum profundus 4-5 Ulnar C8-T1 N None None None N N N N   L. Abductor pollicis brevis Median C8-T1 N None None None N N N N   L. Cervical paraspinals (low)  - N None None None NT NT NT NT          Study Limitations:  none    Summary of Findings:   Nerve conduction studies:   All other nerve conduction studies, as listed in the table were normal in latency, amplitude and conduction velocity.     Needle EMG:   Needle EMG was performed using a concentric needle.  Observed motor units were normal in amplitude, duration, phases and recruitment and no active denervation signs were seen.        Diagnostic Interpretation:   This study was normal. Specific to the question no electrodiagnostic evidence of a left median mononeuropathy or left ulnar neuropathy. EMG is a very sensitive test for the presence of and approximate location of radiculopathy but the segmental level cannot always be definitively determined to a single level. EMG can be completely normal in the first 10-14 days after symptom onset, in a purely

## 2024-03-27 ENCOUNTER — TELEPHONE (OUTPATIENT)
Dept: ADMINISTRATIVE | Age: 64
End: 2024-03-27

## 2024-03-27 NOTE — TELEPHONE ENCOUNTER
Pt called and stated that she spoke to the Pharmacy and the Insurance denied the prior auth due to Dr. Hardy needed to give a reasonable explanation or Dx why she would need the Lidocaine patches. She is asking to speak to the office. Or the Insurance needs to be notified.

## 2024-03-27 NOTE — TELEPHONE ENCOUNTER
Voicemail left for patient. Medication denied per insurance. Notes were sent but shoulder DJD is not a qualified diagnosis. Nothing more we can do from our end. Patient may appeal her insurance if she chooses.

## 2024-03-27 NOTE — TELEPHONE ENCOUNTER
Patient Pharmacy [facility]( Phone number [phone]) requesting prior Auth on patient  lidacaine

## 2024-10-02 ENCOUNTER — OFFICE VISIT (OUTPATIENT)
Dept: ORTHOPEDIC SURGERY | Age: 64
End: 2024-10-02
Payer: MEDICARE

## 2024-10-02 VITALS — HEIGHT: 64 IN | BODY MASS INDEX: 25.1 KG/M2 | WEIGHT: 147 LBS

## 2024-10-02 DIAGNOSIS — G89.29 CHRONIC PAIN OF BOTH SHOULDERS: Primary | ICD-10-CM

## 2024-10-02 DIAGNOSIS — M25.512 LEFT SHOULDER PAIN, UNSPECIFIED CHRONICITY: ICD-10-CM

## 2024-10-02 DIAGNOSIS — M25.511 CHRONIC PAIN OF BOTH SHOULDERS: Primary | ICD-10-CM

## 2024-10-02 DIAGNOSIS — M25.512 CHRONIC PAIN OF BOTH SHOULDERS: Primary | ICD-10-CM

## 2024-10-02 PROCEDURE — 99204 OFFICE O/P NEW MOD 45 MIN: CPT | Performed by: ORTHOPAEDIC SURGERY

## 2024-10-02 RX ORDER — RISPERIDONE 2 MG/1
2 TABLET ORAL
COMMUNITY
Start: 2024-09-07

## 2024-10-02 RX ORDER — TRAMADOL HYDROCHLORIDE 50 MG/1
50 TABLET ORAL 2 TIMES DAILY
COMMUNITY
Start: 2024-08-28

## 2024-10-02 RX ORDER — CETIRIZINE HYDROCHLORIDE 10 MG/1
10 TABLET ORAL DAILY
COMMUNITY
Start: 2024-07-21

## 2024-10-02 RX ORDER — BUDESONIDE, GLYCOPYRROLATE, AND FORMOTEROL FUMARATE 160; 9; 4.8 UG/1; UG/1; UG/1
2 AEROSOL, METERED RESPIRATORY (INHALATION) 2 TIMES DAILY
COMMUNITY
Start: 2024-09-26

## 2024-10-02 RX ORDER — TRAZODONE HYDROCHLORIDE 100 MG/1
100 TABLET ORAL
COMMUNITY
Start: 2024-09-10

## 2024-10-02 RX ORDER — VENLAFAXINE HYDROCHLORIDE 75 MG/1
CAPSULE, EXTENDED RELEASE ORAL DAILY
COMMUNITY
Start: 2024-07-22

## 2024-10-02 RX ORDER — CARIPRAZINE 1.5 MG/1
CAPSULE, GELATIN COATED ORAL
COMMUNITY
Start: 2024-08-29

## 2024-10-02 NOTE — PROGRESS NOTES
[facility]   ORTHOPAEDIC SURGERY AND SPORTS MEDICINE  DATE OF VISIT: 10/02/24  New Shoulder Patient Visit     Referring Provider:   No referring provider defined for this encounter.    CHIEF COMPLAINT:   Chief Complaint   Patient presents with    Shoulder Pain     Left shoulder pain x 1 year - h/o Rupture of left biceps tendon - Dr Becerra - 8/2021 - limited used of the left shoulder     Pain     Left shoulder 10 / 10    Results     XR in [facility]  Patient did not bring MRI disc from [facility]         HPI:      Ana Maria Quiroz is a 64 y.o. year old female who is seen today  for evaluation of bilateral shoulder pain.  Patient reports chronic left shoulder pain has been ongoing for numerous years now.  Patient reports surgical history including a left shoulder arthroscopy, biceps tenodesis, distal clavicle resection, subacromial decompression by Dr. Becerra in August 2021.  Patient states that symptoms were improved for about 8 months before pain gradually returned.  She reports pain has been worsening in severity over the last several months.  States that she had seen another orthopedic provider recently who had told her she would require a total shoulder replacement.  She reports persistent pain limited range of motion with daily activities.  She denies feelings of instability.  She has had no recent treatment.  She is currently taking tramadol prescribed by her PCP for pain which is not fully alleviating her symptoms.      PAST MEDICAL HISTORY  Past Medical History:   Diagnosis Date    Arthritis     Bipolar 1 disorder (HCC)     Chronic fatigue     COPD (chronic obstructive pulmonary disease) (HCC)     Depression     Fibromyalgia     GERD (gastroesophageal reflux disease)     Migraines     Neuropathy     Short-term memory loss        PAST SURGICAL HISTORY  Past Surgical History:   Procedure Laterality Date    CARDIOVASCULAR STRESS TEST  02/12/2021    Lexiscan stress test    CHOLECYSTECTOMY

## 2024-10-03 ENCOUNTER — PREP FOR PROCEDURE (OUTPATIENT)
Dept: ORTHOPEDIC SURGERY | Age: 64
End: 2024-10-03

## 2024-10-03 ENCOUNTER — TELEPHONE (OUTPATIENT)
Dept: ORTHOPEDIC SURGERY | Age: 64
End: 2024-10-03

## 2024-10-03 DIAGNOSIS — M19.012 ARTHRITIS OF LEFT SHOULDER REGION: ICD-10-CM

## 2024-10-03 DIAGNOSIS — M19.012 LOCALIZED OSTEOARTHRITIS OF LEFT SHOULDER: Primary | ICD-10-CM

## 2024-10-03 NOTE — TELEPHONE ENCOUNTER
[facility]  ORTHOPAEDIC SURGERY SCHEDULING NOTE    Patient wishes to proceed after surgery discussion with Dr. Rizzo.     Surgical education was discussed and patient was given the surgical handout. Pre/post-op appointments were made as needed. Patient is also aware to obtain any medical clearances prior to surgery, if requested. Notified that pre-admission testing will also be reaching out for education and instructions on arrival time prior to procedure.     Patient is to obtain clearance(s) from: Medical    Authorization submitted to [facility]   Status:  pt cancelled surgery due to ill     Surgery: left total shoulder replacement.   OR DATE: 11/25/2024  Vendor: [employer]  Block: NENO  CPT: 08487  DX: M19. 012  Special Needs (if applicable): CT Scan

## 2025-02-19 ENCOUNTER — HOSPITAL ENCOUNTER (EMERGENCY)
Age: 65
Discharge: HOME | End: 2025-02-19
Payer: MEDICARE

## 2025-02-19 ENCOUNTER — APPOINTMENT (OUTPATIENT)
Dept: GENERAL RADIOLOGY | Age: 65
End: 2025-02-19
Payer: MEDICARE

## 2025-02-19 VITALS
DIASTOLIC BLOOD PRESSURE: 69 MMHG | SYSTOLIC BLOOD PRESSURE: 114 MMHG | TEMPERATURE: 98.6 F | HEART RATE: 84 BPM | RESPIRATION RATE: 16 BRPM | HEIGHT: 63 IN | WEIGHT: 145 LBS | OXYGEN SATURATION: 90 % | BODY MASS INDEX: 25.69 KG/M2

## 2025-02-19 DIAGNOSIS — S90.32XA CONTUSION OF LEFT FOOT, INITIAL ENCOUNTER: Primary | ICD-10-CM

## 2025-02-19 PROCEDURE — 73600 X-RAY EXAM OF ANKLE: CPT

## 2025-02-19 PROCEDURE — 99283 EMERGENCY DEPT VISIT LOW MDM: CPT

## 2025-02-19 PROCEDURE — 73620 X-RAY EXAM OF FOOT: CPT

## 2025-02-19 PROCEDURE — 6370000000 HC RX 637 (ALT 250 FOR IP): Performed by: NURSE PRACTITIONER

## 2025-02-19 RX ORDER — HYDROCODONE BITARTRATE AND ACETAMINOPHEN 5; 325 MG/1; MG/1
1 TABLET ORAL ONCE
Status: COMPLETED | OUTPATIENT
Start: 2025-02-19 | End: 2025-02-19

## 2025-02-19 RX ADMIN — HYDROCODONE BITARTRATE AND ACETAMINOPHEN 1 TABLET: 5; 325 TABLET ORAL at 18:37

## 2025-02-19 ASSESSMENT — PAIN - FUNCTIONAL ASSESSMENT: PAIN_FUNCTIONAL_ASSESSMENT: 0-10

## 2025-02-19 ASSESSMENT — PAIN DESCRIPTION - ORIENTATION
ORIENTATION: LEFT
ORIENTATION: LEFT

## 2025-02-19 ASSESSMENT — PAIN DESCRIPTION - DESCRIPTORS
DESCRIPTORS: ACHING;DISCOMFORT;THROBBING
DESCRIPTORS: ACHING;DISCOMFORT;SORE

## 2025-02-19 ASSESSMENT — PAIN DESCRIPTION - LOCATION
LOCATION: FOOT
LOCATION: FOOT

## 2025-02-19 ASSESSMENT — PAIN SCALES - GENERAL
PAINLEVEL_OUTOF10: 9
PAINLEVEL_OUTOF10: 9

## 2025-02-20 NOTE — ED PROVIDER NOTES
[facility]  Department of Emergency Medicine   ED  Encounter Note  Admit Date/RoomTime: 2025  4:13 PM  ED Room:     NAME: Ana Maria Quiroz  : 1960  MRN: [id number]     Chief Complaint:  Fall (TWISTED LEFT FOOT A WEEK AGO, STILL HAVING PAIN )    History of Present Illness         Ana Maria Quiroz is a 64 y.o. old female presenting to the emergency department by private vehicle, for traumatic Left foot pain which occured 1 week(s) prior to arrival.  The complaint is due to no known cause.  Patient has no prior history of pain/injury with regards to today's visit.  Since onset the symptoms have been stable with ability to bear weight, but with some pain.  Her pain is aggraveated by any movement and relieved by nothing.  She states she has been trying her tramadol and ibuprofen at home with no relief of symptoms.  She denies any head injury, loss of consciousness, neck pain, chest pain, abdominal pain, numbness or weakness.    ROS   Pertinent positives and negatives are stated within HPI, all other systems reviewed and are negative.    Past Medical History:  has a past medical history of Arthritis, Bipolar 1 disorder (HCC), Chronic fatigue, COPD (chronic obstructive pulmonary disease) (HCC), Depression, Fibromyalgia, GERD (gastroesophageal reflux disease), Migraines, Neuropathy, and Short-term memory loss.    Surgical History:  has a past surgical history that includes Hysterectomy; Cholecystectomy; sinus surgery; Colonoscopy; Endoscopy, colon, diagnostic; cardiovascular stress test (2021); esophageal motility study (N/A, 2021); Shoulder arthroscopy (Left, 8/10/2021); and Nerve Block (Left, 3/1/2023).    Social History:  reports that she has been smoking cigarettes. She has a 44 pack-year smoking history. She has never used smokeless tobacco. She reports that she does not drink alcohol and does not use drugs.    Family History: family history is not on file.

## (undated) DEVICE — PENCIL ES L3M BTTN SWCH HOLSTER W/ BLDE ELECTRD EDGE

## (undated) DEVICE — BLADE SHV L13CM DIA4MM DBL CUT COOLCUT

## (undated) DEVICE — PROBE ABLAT 90DEG ASPIR MULTIPORT BPLR RF 1 PC ELECTRD ERGO

## (undated) DEVICE — NEEDLE SPNL L3.5IN PNK HUB S STL REG WALL FIT STYL W/ QNCKE

## (undated) DEVICE — SUPER TURBOVAC 90 INTEGRATED CABLE WAND ICW: Brand: COBLATION

## (undated) DEVICE — SUTURE PROL SZ 2-0 L30IN NONABSORBABLE BLU L26MM SH 1/2 CIR 8833H

## (undated) DEVICE — BUR SHAVER 5 MMX13 CM 8 FLUT OVL FOR AGGRESSIVE BNE COOLCUT

## (undated) DEVICE — BASIC PACK: Brand: CONVERTORS

## (undated) DEVICE — STERILE POLYISOPRENE POWDER-FREE SURGICAL GLOVES: Brand: PROTEXIS

## (undated) DEVICE — SUTURE SUTTAPE FIBERLINK 1.3MM WHT BLU CLS LOOP AR7535

## (undated) DEVICE — SPONGE LAP W18XL18IN WHT COT 4 PLY FLD STRUNG RADPQ DISP ST

## (undated) DEVICE — GAUZE,SPONGE,4"X4",16PLY,XRAY,STRL,LF: Brand: MEDLINE

## (undated) DEVICE — Z DISCONTINUED GLOVE SURG SZ 7.5 L12IN FNGR THK13MIL WHT ISOLEX

## (undated) DEVICE — 3M™ STERI-DRAPE™ U-DRAPE, LONG 1019: Brand: STERI-DRAPE™

## (undated) DEVICE — 3M™ MEDIPORE™ SOFT CLOTH TAPE, 4 INCH X 10 YARDS, 12 ROLLS/CASE, 2964: Brand: 3M™ MEDIPORE™

## (undated) DEVICE — SYRINGE MED 10ML LUERLOCK TIP W/O SFTY DISP

## (undated) DEVICE — Z CONVERTED USE 2275207 CLOTH PREP W7.5XL7.5IN 2% CHG SKIN ALC AND RNS FREE

## (undated) DEVICE — 1010 S-DRAPE TOWEL DRAPE 10/BX: Brand: STERI-DRAPE™

## (undated) DEVICE — SLEEVE TRAC SPANDEX LAT W/ 4IN COBAN SUPERFICIAL RAD NRV PD

## (undated) DEVICE — SOLUTION SURG PREP ANTIMICROBIAL 4 OZ SKIN WND EXIDINE

## (undated) DEVICE — GLOVE ORANGE PI 7 1/2   MSG9075

## (undated) DEVICE — PEN: MARKING STD 100/CS: Brand: MEDICAL ACTION INDUSTRIES

## (undated) DEVICE — INTENDED FOR TISSUE SEPARATION, AND OTHER PROCEDURES THAT REQUIRE A SHARP SURGICAL BLADE TO PUNCTURE OR CUT.: Brand: BARD-PARKER ® STAINLESS STEEL BLADES

## (undated) DEVICE — DRESSING GZ XRFRM 4X4(25/BX 6BX/CS)

## (undated) DEVICE — SUTURE MCRYL SZ 3-0 L18IN ABSRB UD L19MM PS-2 3/8 CIR PRIM Y497G

## (undated) DEVICE — MEDI-VAC NON-CONDUCTIVE SUCTION TUBING: Brand: CARDINAL HEALTH

## (undated) DEVICE — GARMENT COMPR STD FOR 17IN CALF UNIF THER FLOTRN

## (undated) DEVICE — GOWN SURG XL LNG LEN SPUNBOND REINF VELC TIE LEV 4 IMPERV

## (undated) DEVICE — BANDAGE ADH W1XL3IN NAT FAB WVN FLX DURABLE N ADH PD SEAL

## (undated) DEVICE — 1810 FOAM BLOCK NEEDLE COUNTER: Brand: DEVON

## (undated) DEVICE — SOLUTION IRRIG 1000ML 09% SOD CHL USP PIC PLAS CONTAINER

## (undated) DEVICE — TOWEL OR BLUEE 16X26IN ST 8 PACK ORB08 16X26ORTWL

## (undated) DEVICE — 3M™ IOBAN™ 2 ANTIMICROBIAL INCISE DRAPE 6640EZ: Brand: IOBAN™ 2

## (undated) DEVICE — DRAPE SURG W88XL116IN SMS BODY SPL ORTH N FEN REINF FLD PCH

## (undated) DEVICE — TUBING PMP L16FT MAIN DISP FOR AR-6400 AR-6475

## (undated) DEVICE — REAMER SURG DIA8.5MM PEEK FORKED TIP PILOTED HD W/ BLT IN

## (undated) DEVICE — CHLORAPREP 26ML ORANGE

## (undated) DEVICE — CANNULA ARTHSCP L7CM ID8.25MM TRNSLUC THRD FLX W/ NO SQUIRT

## (undated) DEVICE — NEEDLE HYPO 25GA L1.5IN BLU POLYPR HUB S STL REG BVL STR

## (undated) DEVICE — STANDARD HYPODERMIC NEEDLE,POLYPROPYLENE HUB: Brand: MONOJECT

## (undated) DEVICE — 5-IN-1 BARBED CONNECTOR POLYPROPYLENE 3/16 - 9/16 IN. (5 - 14.3 MM): Brand: ARGYLE

## (undated) DEVICE — SOLUTION IV IRRIG POUR BRL 0.9% SODIUM CHL 2F7124

## (undated) DEVICE — NEEDLE HYPO 18GA L1.5IN PNK POLYPR HUB S STL THN WALL FILL

## (undated) DEVICE — SUTURE ETHLN SZ 3-0 L18IN NONABSORBABLE BLK PS-2 L19MM 3/8 1669H

## (undated) DEVICE — GAUZE,SPONGE,4"X4",16PLY,STRL,LF,10/TRAY: Brand: MEDLINE

## (undated) DEVICE — CANNULA ARTHSCP L7CM DIA7MM TRNSLUC THRD FLX W/ NO SQUIRT

## (undated) DEVICE — 12 ML SYRINGE,LUER-LOCK TIP: Brand: MONOJECT

## (undated) DEVICE — DRAPE,SHOULDER,ORTHOMAX,W/POUCH,5/CS: Brand: MEDLINE

## (undated) DEVICE — GLOVE ORANGE PI 7   MSG9070

## (undated) DEVICE — Z DISCONTINUED APPLICATOR SURG PREP 0.35OZ 2% CHG 70% ISO ALC W/ HI LT

## (undated) DEVICE — MEDI-VAC YANKAUER SUCTION HANDLE W/BULBOUS TIP: Brand: CARDINAL HEALTH